# Patient Record
Sex: FEMALE | Race: WHITE | NOT HISPANIC OR LATINO | Employment: FULL TIME | URBAN - METROPOLITAN AREA
[De-identification: names, ages, dates, MRNs, and addresses within clinical notes are randomized per-mention and may not be internally consistent; named-entity substitution may affect disease eponyms.]

---

## 2017-03-14 ENCOUNTER — OFFICE VISIT (OUTPATIENT)
Dept: INTERNAL MEDICINE | Facility: CLINIC | Age: 37
End: 2017-03-14

## 2017-03-14 VITALS
OXYGEN SATURATION: 99 % | HEIGHT: 65 IN | RESPIRATION RATE: 17 BRPM | HEART RATE: 61 BPM | SYSTOLIC BLOOD PRESSURE: 118 MMHG | DIASTOLIC BLOOD PRESSURE: 78 MMHG | BODY MASS INDEX: 20.76 KG/M2 | WEIGHT: 124.6 LBS

## 2017-03-14 DIAGNOSIS — F41.9 ANXIETY: ICD-10-CM

## 2017-03-14 DIAGNOSIS — F51.01 PRIMARY INSOMNIA: Primary | ICD-10-CM

## 2017-03-14 DIAGNOSIS — M50.320 DEGENERATION OF INTERVERTEBRAL DISC OF MID-CERVICAL REGION, UNSPECIFIED SPINAL LEVEL: ICD-10-CM

## 2017-03-14 DIAGNOSIS — Z87.898 HISTORY OF SEIZURE: ICD-10-CM

## 2017-03-14 DIAGNOSIS — R00.2 PALPITATIONS: ICD-10-CM

## 2017-03-14 PROCEDURE — 99214 OFFICE O/P EST MOD 30 MIN: CPT | Performed by: INTERNAL MEDICINE

## 2017-03-14 NOTE — PROGRESS NOTES
"Subjective   Sharron Guardado is a 37 y.o. female here for   Chief Complaint   Patient presents with   • Insomnia     4 month follow-up for use of Zolpidem   • Seizures   .    Vitals:    03/14/17 1542   BP: 118/78   BP Location: Left arm   Patient Position: Sitting   Cuff Size: Adult   Pulse: 61   Resp: 17   SpO2: 99%   Weight: 124 lb 9.6 oz (56.5 kg)   Height: 65.25\" (165.7 cm)       Insomnia   This is a chronic problem. The current episode started more than 1 year ago. The problem occurs constantly. The problem has been unchanged. Pertinent negatives include no chest pain, chills, coughing, fatigue or fever.   Palpitations    This is a chronic problem. The current episode started more than 1 year ago. The problem occurs intermittently. The problem has been unchanged. Associated symptoms include anxiety (\"not bad enough for meds\"). Pertinent negatives include no chest pain, coughing, fever or shortness of breath.        Encounter Diagnoses   Name Primary?   • Primary insomnia Yes   • Degeneration of intervertebral disc of mid-cervical region, unspecified spinal level    • History of seizure    • Palpitations    • Anxiety        The following portions of the patient's history were reviewed and updated as appropriate: allergies, current medications, past social history and problem list.    Review of Systems   Constitutional: Negative for chills, fatigue and fever.   Respiratory: Negative for cough, shortness of breath and wheezing.    Cardiovascular: Positive for palpitations (off & on for yrs). Negative for chest pain and leg swelling.   Psychiatric/Behavioral: Positive for sleep disturbance (better with ambien). Negative for dysphoric mood. The patient is nervous/anxious (\"not bad enough for meds\") and has insomnia.      She had seizure 6 mos ago, but was told it may have been a \"psychological event\" & not a true seizure.  She has had no further events since taking keppra per neurologist.  She needs form filled out " for driving license.  She states no sleepiness or syncope or other neurologic sx.  She states she never had a problem driving.      Objective   Physical Exam   Constitutional: She is oriented to person, place, and time. She appears well-developed and well-nourished. No distress.   Cardiovascular: Normal rate, regular rhythm and normal heart sounds.    Pulmonary/Chest: No respiratory distress. She has no wheezes. She has no rales. She exhibits no tenderness.   Musculoskeletal: She exhibits no edema.   Neurological: She is alert and oriented to person, place, and time. No cranial nerve deficit. She exhibits normal muscle tone. Coordination normal.   Psychiatric: She has a normal mood and affect. Her behavior is normal. Judgment and thought content normal.   Nursing note and vitals reviewed.      Assessment/Plan   Problem List Items Addressed This Visit        Unprioritized    Degeneration of intervertebral disc of mid-cervical region    Insomnia - Primary    History of seizure    Palpitations    Anxiety         S/p event (possible seizure vs anxiety attack 9/15/2016) - ok to drive b/c no recurrent episodes while taking keppra per neurologist.   Palpitations off & on for yrs - call if worsening.   Anxiety (strong family history) & insomnia - need nightly gabapentin (1/4, then 1/2, then 3/4, then 1 qhs if needed).  Ok to use 1/4-1/2 ambien qhs if needed, but use sparingly to prevent habituation.    Frequent neck pain - nightly gabapentin should help chronic pain.     Form filled out - she may get drivers license again b/c no episodes for 6 mos.

## 2017-04-10 DIAGNOSIS — F51.01 PRIMARY INSOMNIA: ICD-10-CM

## 2017-04-10 DIAGNOSIS — M54.2 NECK PAIN: ICD-10-CM

## 2017-04-10 RX ORDER — GABAPENTIN 600 MG/1
TABLET ORAL
Qty: 90 TABLET | Refills: 1 | Status: SHIPPED | OUTPATIENT
Start: 2017-04-10 | End: 2017-10-05 | Stop reason: SDUPTHER

## 2017-04-14 ENCOUNTER — OFFICE VISIT (OUTPATIENT)
Dept: INTERNAL MEDICINE | Facility: CLINIC | Age: 37
End: 2017-04-14

## 2017-04-14 VITALS
BODY MASS INDEX: 20.69 KG/M2 | HEIGHT: 65 IN | SYSTOLIC BLOOD PRESSURE: 116 MMHG | WEIGHT: 124.2 LBS | TEMPERATURE: 98.5 F | DIASTOLIC BLOOD PRESSURE: 76 MMHG

## 2017-04-14 DIAGNOSIS — H92.01 EAR PAIN, RIGHT: Primary | ICD-10-CM

## 2017-04-14 DIAGNOSIS — J02.9 SORE THROAT: ICD-10-CM

## 2017-04-14 PROCEDURE — 99213 OFFICE O/P EST LOW 20 MIN: CPT | Performed by: INTERNAL MEDICINE

## 2017-04-14 NOTE — PROGRESS NOTES
"Subjective   Sharron Guardado is a 37 y.o. female here for   Chief Complaint   Patient presents with   • Sore Throat     x 1 day   • Ear Fullness     Right Ear   .    Vitals:    04/14/17 1331   BP: 116/76   BP Location: Left arm   Patient Position: Sitting   Cuff Size: Adult   Temp: 98.5 °F (36.9 °C)   TempSrc: Temporal Artery    Weight: 124 lb 3.2 oz (56.3 kg)   Height: 65.25\" (165.7 cm)       Sore Throat    This is a new problem. The current episode started yesterday. There has been no fever. The pain is mild. Associated symptoms include ear pain and a plugged ear sensation. Pertinent negatives include no coughing, diarrhea, headaches, shortness of breath, swollen glands, trouble swallowing or vomiting.   Ear Fullness    There is pain in the right ear. This is a new problem. The current episode started yesterday. The problem occurs constantly. The problem has been unchanged. There has been no fever. The pain is moderate. Associated symptoms include a sore throat. Pertinent negatives include no coughing, diarrhea, headaches or vomiting.        Encounter Diagnoses   Name Primary?   • Ear pain, right Yes   • Sore throat        The following portions of the patient's history were reviewed and updated as appropriate: allergies, current medications, past social history and problem list.    Review of Systems   HENT: Positive for ear pain and sore throat. Negative for trouble swallowing.    Respiratory: Negative for cough and shortness of breath.    Gastrointestinal: Negative for diarrhea and vomiting.   Neurological: Negative for headaches.       Objective   Physical Exam   Constitutional: She appears well-developed and well-nourished. No distress.   HENT:   Head: Normocephalic.   Right Ear: External ear normal.   Left Ear: External ear normal. Tympanic membrane is not erythematous.   Nose: Right sinus exhibits no frontal sinus tenderness. Left sinus exhibits no frontal sinus tenderness.   Mouth/Throat: Oropharynx is " clear and moist. No oropharyngeal exudate.   Neck: Normal range of motion. Neck supple.   Cardiovascular: Normal rate, regular rhythm and normal heart sounds.    Pulmonary/Chest: Effort normal and breath sounds normal. No respiratory distress. She has no wheezes. She has no rales. She exhibits no tenderness.   Musculoskeletal: She exhibits no edema.   Lymphadenopathy:     She has no cervical adenopathy.   Psychiatric: She has a normal mood and affect. Her behavior is normal.   Nursing note and vitals reviewed.    Right ear canal blocked with cerumen.  +clear mucous in posterior pharynx.    Assessment/Plan   Problem List Items Addressed This Visit     None      Visit Diagnoses     Ear pain, right    -  Primary    Relevant Orders    Ear Cerumen Removal Lavage    Sore throat             Right ear pain resolved with removal of large amount of cerumen.  She tolerated procedure well with normal TM afterwards.   Mild sore throat is likely from postnasal drip.  Need daily allegra or claritin.  Call if sx persist.

## 2017-05-10 DIAGNOSIS — F51.01 PRIMARY INSOMNIA: ICD-10-CM

## 2017-05-10 RX ORDER — ZOLPIDEM TARTRATE 10 MG/1
10 TABLET ORAL NIGHTLY PRN
Qty: 30 TABLET | Refills: 3 | OUTPATIENT
Start: 2017-05-10 | End: 2017-08-15 | Stop reason: SDUPTHER

## 2017-08-15 ENCOUNTER — OFFICE VISIT (OUTPATIENT)
Dept: INTERNAL MEDICINE | Facility: CLINIC | Age: 37
End: 2017-08-15

## 2017-08-15 VITALS
HEIGHT: 65 IN | BODY MASS INDEX: 21.26 KG/M2 | SYSTOLIC BLOOD PRESSURE: 108 MMHG | DIASTOLIC BLOOD PRESSURE: 76 MMHG | WEIGHT: 127.6 LBS

## 2017-08-15 DIAGNOSIS — F51.01 PRIMARY INSOMNIA: Primary | ICD-10-CM

## 2017-08-15 DIAGNOSIS — J02.9 SORE THROAT: ICD-10-CM

## 2017-08-15 PROCEDURE — 99213 OFFICE O/P EST LOW 20 MIN: CPT | Performed by: INTERNAL MEDICINE

## 2017-08-15 RX ORDER — ZOLPIDEM TARTRATE 10 MG/1
10 TABLET ORAL NIGHTLY PRN
Qty: 30 TABLET | Refills: 3 | OUTPATIENT
Start: 2017-08-15 | End: 2017-10-25 | Stop reason: SDUPTHER

## 2017-08-15 NOTE — PROGRESS NOTES
"Subjective   Sharron Guardado is a 37 y.o. female here for   Chief Complaint   Patient presents with   • Insomnia     3 month follow-up   .    Vitals:    08/15/17 1539   BP: 108/76   BP Location: Left arm   Patient Position: Sitting   Cuff Size: Adult   Weight: 127 lb 9.6 oz (57.9 kg)   Height: 65.25\" (165.7 cm)       Insomnia   This is a chronic problem. The current episode started more than 1 year ago. The problem occurs constantly. The problem has been unchanged. Associated symptoms include a sore throat. Pertinent negatives include no chest pain, chills, congestion, coughing, fatigue or fever.        Encounter Diagnoses   Name Primary?   • Primary insomnia Yes   • Sore throat        The following portions of the patient's history were reviewed and updated as appropriate: allergies, current medications, past social history and problem list.    Review of Systems   Constitutional: Negative for chills, fatigue and fever.   HENT: Positive for sore throat. Negative for congestion, ear discharge, ear pain, postnasal drip, sinus pressure, trouble swallowing and voice change.    Respiratory: Negative for cough, shortness of breath and wheezing.    Cardiovascular: Negative for chest pain, palpitations and leg swelling.   Psychiatric/Behavioral: Positive for sleep disturbance. Negative for dysphoric mood. The patient has insomnia. The patient is not nervous/anxious.        Objective   Physical Exam   Constitutional: She appears well-developed and well-nourished. No distress.   Cardiovascular: Normal rate, regular rhythm and normal heart sounds.    Pulmonary/Chest: No respiratory distress. She has no wheezes. She has no rales. She exhibits no tenderness.   Musculoskeletal: She exhibits no edema.   Psychiatric: She has a normal mood and affect. Her behavior is normal.   Nursing note and vitals reviewed.      Assessment/Plan   Problem List Items Addressed This Visit        Unprioritized    Insomnia - Primary    Relevant " Medications    zolpidem (AMBIEN) 10 MG tablet      Other Visit Diagnoses     Sore throat           Insomnia - ambien called in.  Ok to use 5/7 nights (not nightly).  Call if any problems.   Mild sore throat from allergies (normal exam today) - call if any increase in sx in spite of allegra daily.

## 2017-10-03 ENCOUNTER — TELEPHONE (OUTPATIENT)
Dept: INTERNAL MEDICINE | Facility: CLINIC | Age: 37
End: 2017-10-03

## 2017-10-03 NOTE — TELEPHONE ENCOUNTER
----- Message from Halley Matias MA sent at 10/3/2017 11:09 AM EDT -----  Pt calling and would like an update on form from [facility]. 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

## 2017-10-03 NOTE — TELEPHONE ENCOUNTER
Message on on v/m that paperwork was completed by Dr. Mercer and faxed back over to [place]  [employer] Medical Review Office.

## 2017-10-05 DIAGNOSIS — M54.2 NECK PAIN: ICD-10-CM

## 2017-10-05 DIAGNOSIS — F51.01 PRIMARY INSOMNIA: ICD-10-CM

## 2017-10-06 RX ORDER — GABAPENTIN 600 MG/1
TABLET ORAL
Qty: 90 TABLET | Refills: 1 | OUTPATIENT
Start: 2017-10-06 | End: 2018-05-11 | Stop reason: SDUPTHER

## 2017-10-25 DIAGNOSIS — F51.01 PRIMARY INSOMNIA: ICD-10-CM

## 2017-10-26 RX ORDER — ZOLPIDEM TARTRATE 10 MG/1
TABLET ORAL
Qty: 30 TABLET | Refills: 1 | OUTPATIENT
Start: 2017-10-26 | End: 2017-12-26 | Stop reason: SDUPTHER

## 2017-12-05 ENCOUNTER — OFFICE VISIT (OUTPATIENT)
Dept: INTERNAL MEDICINE | Facility: CLINIC | Age: 37
End: 2017-12-05

## 2017-12-05 VITALS
HEIGHT: 65 IN | WEIGHT: 129.6 LBS | DIASTOLIC BLOOD PRESSURE: 74 MMHG | SYSTOLIC BLOOD PRESSURE: 114 MMHG | BODY MASS INDEX: 21.59 KG/M2

## 2017-12-05 DIAGNOSIS — M54.2 NECK PAIN: ICD-10-CM

## 2017-12-05 DIAGNOSIS — E04.9 THYROID ENLARGED: ICD-10-CM

## 2017-12-05 DIAGNOSIS — F51.01 PRIMARY INSOMNIA: Primary | ICD-10-CM

## 2017-12-05 DIAGNOSIS — G40.909 SEIZURE DISORDER (HCC): ICD-10-CM

## 2017-12-05 PROCEDURE — 99213 OFFICE O/P EST LOW 20 MIN: CPT | Performed by: INTERNAL MEDICINE

## 2017-12-05 NOTE — PROGRESS NOTES
"Subjective   Sharron Guardado is a 37 y.o. female here for   Chief Complaint   Patient presents with   • Insomnia     4 month follow-up   • Neck Pain   .    Vitals:    12/05/17 1609   BP: 114/74   BP Location: Left arm   Patient Position: Sitting   Cuff Size: Adult   Weight: 58.8 kg (129 lb 9.6 oz)   Height: 165.7 cm (65.25\")       Body mass index is 21.4 kg/(m^2).    Insomnia   This is a chronic problem. The current episode started more than 1 year ago. The problem occurs constantly. The problem has been unchanged. Associated symptoms include neck pain. Pertinent negatives include no chest pain, chills, coughing, fatigue or fever.   Neck Pain    This is a recurrent problem. The current episode started more than 1 year ago. The problem occurs intermittently. The problem has been waxing and waning. The pain is associated with nothing. The pain is present in the left side. Pertinent negatives include no chest pain or fever.        The following portions of the patient's history were reviewed and updated as appropriate: allergies, current medications, past social history and problem list.    Review of Systems   Constitutional: Negative for chills, fatigue and fever.   Respiratory: Negative for cough, shortness of breath and wheezing.    Cardiovascular: Positive for palpitations (not worse). Negative for chest pain and leg swelling.   Musculoskeletal: Positive for neck pain.   Psychiatric/Behavioral: Positive for sleep disturbance. Negative for dysphoric mood. The patient has insomnia. The patient is not nervous/anxious.        Objective   Physical Exam   Constitutional: She appears well-developed and well-nourished. No distress.   Cardiovascular: Normal rate, regular rhythm and normal heart sounds.    Pulmonary/Chest: No respiratory distress. She has no wheezes. She has no rales. She exhibits no tenderness.   Musculoskeletal: She exhibits no edema.   Psychiatric: She has a normal mood and affect. Her behavior is normal. "   Nursing note and vitals reviewed.      Assessment/Plan   Diagnoses and all orders for this visit:    Primary insomnia  Comments:  ok with ambien - use sparingly (warned again of habituation) - call if any side effects    Neck pain  Comments:  off/on - ok with occ gabapentin    Thyroid enlarged  Comments:  borderline - need u/s  Orders:  -     US Thyroid    Seizure disorder  Comments:  none with keppra - call if problems

## 2017-12-21 ENCOUNTER — HOSPITAL ENCOUNTER (OUTPATIENT)
Dept: ULTRASOUND IMAGING | Facility: HOSPITAL | Age: 37
Discharge: HOME | End: 2017-12-21
Attending: INTERNAL MEDICINE | Admitting: INTERNAL MEDICINE

## 2017-12-21 PROCEDURE — 76536 US EXAM OF HEAD AND NECK: CPT

## 2017-12-26 DIAGNOSIS — F51.01 PRIMARY INSOMNIA: ICD-10-CM

## 2017-12-27 RX ORDER — ZOLPIDEM TARTRATE 10 MG/1
TABLET ORAL
Qty: 30 TABLET | Refills: 2 | OUTPATIENT
Start: 2017-12-27 | End: 2018-03-26 | Stop reason: SDUPTHER

## 2017-12-28 ENCOUNTER — TELEPHONE (OUTPATIENT)
Dept: INTERNAL MEDICINE | Facility: CLINIC | Age: 37
End: 2017-12-28

## 2017-12-28 NOTE — TELEPHONE ENCOUNTER
----- Message from Asha Palmer sent at 12/28/2017  1:56 PM EST -----  Contact: pt - Dr Mercer's pt - RE: US results  Pt calling and would like results of US done on 12/05/2017. Results are in pt's chart. Could you please call pt to discuss? Please advise. Thanks      Pt # [phone] or [phone]

## 2018-03-26 DIAGNOSIS — F51.01 PRIMARY INSOMNIA: ICD-10-CM

## 2018-03-27 RX ORDER — ZOLPIDEM TARTRATE 10 MG/1
TABLET ORAL
Qty: 30 TABLET | Refills: 0 | OUTPATIENT
Start: 2018-03-27 | End: 2018-05-01 | Stop reason: SDUPTHER

## 2018-03-27 NOTE — TELEPHONE ENCOUNTER
Please review refill request:    Last OV:12/5/17 next OV 4/5/18    Last Prescribed: 12/5/17 # 30 w/ 2 refills    KOTA: Ordered     Thank you,    Danny

## 2018-04-05 ENCOUNTER — OFFICE VISIT (OUTPATIENT)
Dept: INTERNAL MEDICINE | Facility: CLINIC | Age: 38
End: 2018-04-05

## 2018-04-05 VITALS
HEART RATE: 69 BPM | OXYGEN SATURATION: 94 % | HEIGHT: 65 IN | WEIGHT: 129 LBS | SYSTOLIC BLOOD PRESSURE: 122 MMHG | DIASTOLIC BLOOD PRESSURE: 88 MMHG | BODY MASS INDEX: 21.49 KG/M2

## 2018-04-05 DIAGNOSIS — Z00.00 ANNUAL PHYSICAL EXAM: Primary | ICD-10-CM

## 2018-04-05 DIAGNOSIS — F51.01 PRIMARY INSOMNIA: ICD-10-CM

## 2018-04-05 DIAGNOSIS — R53.82 CHRONIC FATIGUE: ICD-10-CM

## 2018-04-05 DIAGNOSIS — M54.2 NECK PAIN: ICD-10-CM

## 2018-04-05 DIAGNOSIS — G40.109 TEMPORAL LOBE EPILEPSY (HCC): ICD-10-CM

## 2018-04-05 PROBLEM — R53.83 FATIGUE: Status: ACTIVE | Noted: 2018-04-05

## 2018-04-05 LAB
25(OH)D3 SERPL-MCNC: 56.8 NG/ML (ref 30–100)
ALBUMIN SERPL-MCNC: 4.5 G/DL (ref 3.5–5.2)
ALBUMIN/GLOB SERPL: 1.7 G/DL
ALP SERPL-CCNC: 57 U/L (ref 39–117)
ALT SERPL W P-5'-P-CCNC: 14 U/L (ref 1–33)
ANION GAP SERPL CALCULATED.3IONS-SCNC: 13.5 MMOL/L
AST SERPL-CCNC: 17 U/L (ref 1–32)
BACTERIA UR QL AUTO: ABNORMAL /HPF
BASOPHILS # BLD AUTO: 0 10*3/MM3 (ref 0–0.2)
BASOPHILS NFR BLD AUTO: 0 % (ref 0–2)
BILIRUB SERPL-MCNC: 1 MG/DL (ref 0.1–1.2)
BILIRUB UR QL STRIP: NEGATIVE
BUN BLD-MCNC: 13 MG/DL (ref 6–20)
BUN/CREAT SERPL: 16 (ref 7–25)
CALCIUM SPEC-SCNC: 10.2 MG/DL (ref 8.6–10.5)
CHLORIDE SERPL-SCNC: 100 MMOL/L (ref 98–107)
CHOLEST SERPL-MCNC: 176 MG/DL (ref 0–200)
CLARITY UR: CLEAR
CO2 SERPL-SCNC: 26.5 MMOL/L (ref 22–29)
COLOR UR: YELLOW
CREAT BLD-MCNC: 0.81 MG/DL (ref 0.57–1)
DEPRECATED RDW RBC AUTO: 39.9 FL (ref 37–54)
EOSINOPHIL # BLD AUTO: 0.31 10*3/MM3 (ref 0–0.7)
EOSINOPHIL NFR BLD AUTO: 4.7 % (ref 0–5)
ERYTHROCYTE [DISTWIDTH] IN BLOOD BY AUTOMATED COUNT: 11.6 % (ref 11.5–15)
GFR SERPL CREATININE-BSD FRML MDRD: 79 ML/MIN/1.73
GLOBULIN UR ELPH-MCNC: 2.7 GM/DL
GLUCOSE BLD-MCNC: 87 MG/DL (ref 65–99)
GLUCOSE UR STRIP-MCNC: NEGATIVE MG/DL
HCT VFR BLD AUTO: 38.8 % (ref 34.1–44.9)
HDLC SERPL-MCNC: 91 MG/DL (ref 40–60)
HGB BLD-MCNC: 13 G/DL (ref 11.2–15.7)
HGB UR QL STRIP.AUTO: NEGATIVE
HYALINE CASTS UR QL AUTO: ABNORMAL /LPF
KETONES UR QL STRIP: NEGATIVE
LDLC SERPL CALC-MCNC: 62 MG/DL (ref 0–100)
LDLC/HDLC SERPL: 0.69 {RATIO}
LEUKOCYTE ESTERASE UR QL STRIP.AUTO: ABNORMAL
LYMPHOCYTES # BLD AUTO: 2.25 10*3/MM3 (ref 0.8–7)
LYMPHOCYTES NFR BLD AUTO: 34 % (ref 10–60)
MCH RBC QN AUTO: 32.4 PG (ref 26–34)
MCHC RBC AUTO-ENTMCNC: 33.5 G/DL (ref 31–37)
MCV RBC AUTO: 96.8 FL (ref 80–100)
MONOCYTES # BLD AUTO: 0.46 10*3/MM3 (ref 0–1)
MONOCYTES NFR BLD AUTO: 7 % (ref 0–13)
NEUTROPHILS # BLD AUTO: 3.59 10*3/MM3 (ref 1–11)
NEUTROPHILS NFR BLD AUTO: 54.3 % (ref 30–85)
NITRITE UR QL STRIP: NEGATIVE
PH UR STRIP.AUTO: 6.5 [PH] (ref 5–8)
PLATELET # BLD AUTO: 191 10*3/MM3 (ref 150–450)
PMV BLD AUTO: 11.2 FL (ref 6–12)
POTASSIUM BLD-SCNC: 3.6 MMOL/L (ref 3.5–5.2)
PROT SERPL-MCNC: 7.2 G/DL (ref 6–8.5)
PROT UR QL STRIP: NEGATIVE
RBC # BLD AUTO: 4.01 10*6/MM3 (ref 3.93–5.22)
RBC # UR: ABNORMAL /HPF
REF LAB TEST METHOD: ABNORMAL
SODIUM BLD-SCNC: 140 MMOL/L (ref 136–145)
SP GR UR STRIP: <=1.005 (ref 1–1.03)
SQUAMOUS #/AREA URNS HPF: ABNORMAL /HPF
TRIGL SERPL-MCNC: 113 MG/DL (ref 0–150)
TSH SERPL DL<=0.05 MIU/L-ACNC: 3.48 MIU/ML (ref 0.27–4.2)
UROBILINOGEN UR QL STRIP: ABNORMAL
VLDLC SERPL-MCNC: 22.6 MG/DL (ref 5–40)
WBC NRBC COR # BLD: 6.61 10*3/MM3 (ref 5–10)
WBC UR QL AUTO: ABNORMAL /HPF

## 2018-04-05 PROCEDURE — 82306 VITAMIN D 25 HYDROXY: CPT | Performed by: INTERNAL MEDICINE

## 2018-04-05 PROCEDURE — 99395 PREV VISIT EST AGE 18-39: CPT | Performed by: INTERNAL MEDICINE

## 2018-04-05 PROCEDURE — 80053 COMPREHEN METABOLIC PANEL: CPT | Performed by: INTERNAL MEDICINE

## 2018-04-05 PROCEDURE — 85025 COMPLETE CBC W/AUTO DIFF WBC: CPT | Performed by: INTERNAL MEDICINE

## 2018-04-05 PROCEDURE — 81001 URINALYSIS AUTO W/SCOPE: CPT | Performed by: INTERNAL MEDICINE

## 2018-04-05 PROCEDURE — 36415 COLL VENOUS BLD VENIPUNCTURE: CPT | Performed by: INTERNAL MEDICINE

## 2018-04-05 PROCEDURE — 84443 ASSAY THYROID STIM HORMONE: CPT | Performed by: INTERNAL MEDICINE

## 2018-04-05 PROCEDURE — 80061 LIPID PANEL: CPT | Performed by: INTERNAL MEDICINE

## 2018-04-05 NOTE — PROGRESS NOTES
"Subjective   Sharron Guardado is a 38 y.o. female here for   Chief Complaint   Patient presents with   • Insomnia     4 mo follow up   • Neck Pain     Pain is better    • Annual Exam   .    Vitals:    04/05/18 0801 04/05/18 0850   BP: 128/90 122/88   BP Location: Right arm    Pulse: 69    SpO2: 94%    Weight: 58.5 kg (129 lb)    Height: 165.7 cm (65.24\")        Body mass index is 21.31 kg/m².    Insomnia   This is a chronic problem. The problem occurs constantly. The problem has been unchanged. Associated symptoms include fatigue and neck pain. Pertinent negatives include no abdominal pain, arthralgias, chest pain, chills, congestion, coughing, fever, headaches, joint swelling, myalgias, nausea, numbness, rash, sore throat, vomiting or weakness.   Neck Pain    This is a chronic problem. The current episode started more than 1 year ago. The problem occurs constantly. The problem has been unchanged. Pertinent negatives include no chest pain, fever, headaches, numbness, trouble swallowing or weakness.        The following portions of the patient's history were reviewed and updated as appropriate: allergies, current medications, past social history and problem list.    Review of Systems   Constitutional: Positive for fatigue. Negative for appetite change, chills, fever and unexpected weight change.   HENT: Negative for congestion, ear pain, mouth sores, sore throat, tinnitus, trouble swallowing and voice change.    Eyes: Negative for pain and visual disturbance.   Respiratory: Negative for cough, choking, shortness of breath and wheezing.    Cardiovascular: Negative for chest pain, palpitations and leg swelling.   Gastrointestinal: Negative for abdominal pain, blood in stool, constipation, diarrhea, nausea and vomiting.   Endocrine: Negative for cold intolerance, heat intolerance, polydipsia, polyphagia and polyuria.   Genitourinary: Negative for difficulty urinating, dysuria, flank pain, frequency, hematuria and " urgency.   Musculoskeletal: Positive for neck pain and neck stiffness. Negative for arthralgias, back pain, gait problem, joint swelling and myalgias.   Skin: Negative for color change and rash.   Allergic/Immunologic: Positive for environmental allergies. Negative for food allergies and immunocompromised state.   Neurological: Negative for dizziness, tremors, seizures, syncope, speech difficulty, weakness, numbness and headaches.   Hematological: Negative for adenopathy. Does not bruise/bleed easily.   Psychiatric/Behavioral: Positive for sleep disturbance. Negative for agitation, confusion, decreased concentration, dysphoric mood and suicidal ideas. The patient has insomnia. The patient is not nervous/anxious.        Objective   Physical Exam   Constitutional: She appears well-developed and well-nourished. No distress.   HENT:   Right Ear: Hearing, tympanic membrane, external ear and ear canal normal.   Left Ear: Hearing, tympanic membrane, external ear and ear canal normal.   Nose: Right sinus exhibits no maxillary sinus tenderness and no frontal sinus tenderness. Left sinus exhibits no maxillary sinus tenderness and no frontal sinus tenderness.   Eyes: Conjunctivae, EOM and lids are normal. Pupils are equal, round, and reactive to light.   Neck: Trachea normal. Neck supple. No JVD present. Carotid bruit is not present. No tracheal deviation present. No thyroid mass and no thyromegaly present.   Cardiovascular: Normal rate, regular rhythm, S1 normal, S2 normal and normal heart sounds.  Exam reveals no gallop and no friction rub.    No murmur heard.  Pulses:       Carotid pulses are 2+ on the right side, and 2+ on the left side.       Radial pulses are 2+ on the right side, and 2+ on the left side.        Dorsalis pedis pulses are 2+ on the right side, and 2+ on the left side.        Posterior tibial pulses are 2+ on the right side, and 2+ on the left side.   Pulmonary/Chest: Effort normal and breath sounds  normal. No respiratory distress. She has no wheezes. She has no rales. Chest wall is not dull to percussion. She exhibits no tenderness.   Abdominal: Soft. Normal aorta and bowel sounds are normal. She exhibits no abdominal bruit. There is no hepatosplenomegaly. There is no tenderness. There is no rebound and no guarding. No hernia.   Musculoskeletal: Normal range of motion. She exhibits no edema.   Lymphadenopathy:     She has no cervical adenopathy.     She has no axillary adenopathy.        Right: No supraclavicular adenopathy present.        Left: No supraclavicular adenopathy present.   Neurological: She is alert. She has normal strength. No cranial nerve deficit or sensory deficit. She displays a negative Romberg sign.   Reflex Scores:       Patellar reflexes are 2+ on the right side and 2+ on the left side.  Skin: Skin is warm and dry.   Psychiatric: She has a normal mood and affect. Her behavior is normal.   Nursing note and vitals reviewed.      Assessment/Plan   Diagnoses and all orders for this visit:    Annual physical exam  -     CBC Auto Differential; Future  -     Comprehensive Metabolic Panel; Future  -     Lipid Panel; Future  -     TSH Rfx On Abnormal To Free T4; Future  -     Urinalysis With / Microscopic If Indicated - Urine, Clean Catch; Future  -     CBC Auto Differential  -     Comprehensive Metabolic Panel  -     Lipid Panel  -     TSH Rfx On Abnormal To Free T4  -     Urinalysis With / Microscopic If Indicated - Urine, Clean Catch  -     Urinalysis, Microscopic Only - Urine, Clean Catch; Future  -     Urinalysis, Microscopic Only - Urine, Clean Catch    Temporal lobe epilepsy  Comments:  ok with daily keppra - need level  Orders:  -     Levetiracetam Level (Keppra); Future  -     Levetiracetam Level (Keppra)    Neck pain  Comments:  slt better with nightly gabapentin (no better with PT, epidural injections,etc)    Primary insomnia  Comments:  no change for many yrs - still needing 1/2 ambien  nightly - call if any side effects    Chronic fatigue  Comments:  call if worse  Orders:  -     TSH Rfx On Abnormal To Free T4; Future  -     Vitamin D 25 Hydroxy; Future  -     TSH Rfx On Abnormal To Free T4  -     Vitamin D 25 Hydroxy

## 2018-04-08 LAB — LEVETIRACETAM SERPL-MCNC: 10.6 UG/ML (ref 10–40)

## 2018-05-01 DIAGNOSIS — F51.01 PRIMARY INSOMNIA: ICD-10-CM

## 2018-05-02 RX ORDER — ZOLPIDEM TARTRATE 10 MG/1
TABLET ORAL
Qty: 30 TABLET | Refills: 0 | Status: SHIPPED | OUTPATIENT
Start: 2018-05-02 | End: 2018-06-04 | Stop reason: SDUPTHER

## 2018-05-11 DIAGNOSIS — F51.01 PRIMARY INSOMNIA: ICD-10-CM

## 2018-05-11 DIAGNOSIS — M54.2 NECK PAIN: ICD-10-CM

## 2018-05-11 RX ORDER — GABAPENTIN 600 MG/1
600 TABLET ORAL NIGHTLY
Qty: 90 TABLET | Refills: 1 | OUTPATIENT
Start: 2018-05-11 | End: 2019-01-05 | Stop reason: SDUPTHER

## 2018-05-11 NOTE — TELEPHONE ENCOUNTER
----- Message from Asha Palmer sent at 5/11/2018  8:53 AM EDT -----  Contact: pt - Dr Mercer's pt - RE: Rx refill  Pt calling and would like a refill on Rx      gabapentin (NEURONTIN) 600 MG tablet 90 tablet    Sig: TAKE 1 TABLET BY MOUTH EVERY NIGHT     [facility] [facility] [address] [address] AT [facility] - [phone] [facility] [phone] FX    Pt # [phone]

## 2018-06-04 DIAGNOSIS — F51.01 PRIMARY INSOMNIA: ICD-10-CM

## 2018-06-07 RX ORDER — ZOLPIDEM TARTRATE 10 MG/1
TABLET ORAL
Qty: 30 TABLET | Refills: 0 | Status: SHIPPED | OUTPATIENT
Start: 2018-06-07 | End: 2018-07-11 | Stop reason: SDUPTHER

## 2018-07-02 ENCOUNTER — OFFICE VISIT (OUTPATIENT)
Dept: INTERNAL MEDICINE | Facility: CLINIC | Age: 38
End: 2018-07-02

## 2018-07-02 VITALS
WEIGHT: 126 LBS | DIASTOLIC BLOOD PRESSURE: 86 MMHG | SYSTOLIC BLOOD PRESSURE: 128 MMHG | BODY MASS INDEX: 20.82 KG/M2 | TEMPERATURE: 98.3 F | OXYGEN SATURATION: 98 % | HEART RATE: 68 BPM

## 2018-07-02 DIAGNOSIS — J06.9 ACUTE URI: Primary | ICD-10-CM

## 2018-07-02 PROCEDURE — 99213 OFFICE O/P EST LOW 20 MIN: CPT | Performed by: NURSE PRACTITIONER

## 2018-07-02 RX ORDER — GUAIFENESIN 600 MG/1
1200 TABLET, EXTENDED RELEASE ORAL 2 TIMES DAILY
Qty: 28 TABLET | Refills: 0
Start: 2018-07-02 | End: 2018-07-09

## 2018-07-02 NOTE — PROGRESS NOTES
Subjective   Sharron Guardado is a 38 y.o. female.     No recent air travel. Several individuals at work have been sick. She was given z-fernanda by a provider which she has completed reports her symptoms are worst.       URI    This is a new problem. The current episode started 1 to 4 weeks ago. The problem has been gradually worsening. There has been no fever. Associated symptoms include congestion, coughing, rhinorrhea, sinus pain, sneezing and a sore throat (getting better). Pertinent negatives include no abdominal pain, chest pain, diarrhea, ear pain, headaches, nausea, neck pain, plugged ear sensation, vomiting or wheezing. Treatments tried: z-fernanda, mucinex, tylenol pm, ibuprofen , allegra and flonase  The treatment provided moderate relief.        The following portions of the patient's history were reviewed and updated as appropriate: allergies, current medications, past social history and problem list.    Review of Systems   Constitutional: Positive for chills. Negative for appetite change, fatigue and fever.   HENT: Positive for congestion, postnasal drip, rhinorrhea, sinus pain, sneezing and sore throat (getting better). Negative for ear discharge, ear pain, facial swelling, hearing loss, sinus pressure and tinnitus.    Respiratory: Positive for cough. Negative for chest tightness, shortness of breath and wheezing.    Cardiovascular: Negative for chest pain, palpitations and leg swelling.   Gastrointestinal: Negative for abdominal pain, diarrhea, nausea and vomiting.   Musculoskeletal: Negative for neck pain and neck stiffness.   Allergic/Immunologic: Positive for environmental allergies.   Neurological: Negative for headaches.   Hematological: Negative for adenopathy.       Objective   Physical Exam   Constitutional: She appears well-developed and well-nourished. She is cooperative. She does not have a sickly appearance. She does not appear ill.   HENT:   Head: Normocephalic.   Right Ear: Hearing and external  ear normal. No drainage, swelling or tenderness. No mastoid tenderness. Tympanic membrane is bulging. Tympanic membrane is not injected, not scarred and not erythematous. Tympanic membrane mobility is normal. No middle ear effusion. No decreased hearing is noted.   Left Ear: Hearing and external ear normal. No drainage, swelling or tenderness. No mastoid tenderness. Tympanic membrane is bulging. Tympanic membrane is not injected, not scarred and not erythematous. Tympanic membrane mobility is normal.  No middle ear effusion. No decreased hearing is noted.   Nose: No mucosal edema, rhinorrhea, sinus tenderness or nasal deformity. Right sinus exhibits maxillary sinus tenderness. Right sinus exhibits no frontal sinus tenderness. Left sinus exhibits maxillary sinus tenderness. Left sinus exhibits no frontal sinus tenderness.   Mouth/Throat: Mucous membranes are normal. Normal dentition. Posterior oropharyngeal erythema (with hypersecretions ) present. No tonsillar exudate.   Eyes: Conjunctivae and lids are normal. Pupils are equal, round, and reactive to light. Right eye exhibits no discharge and no exudate. Left eye exhibits no discharge and no exudate.   Neck: Trachea normal and normal range of motion. No edema present. No thyroid mass and no thyromegaly present.   Cardiovascular: Regular rhythm, normal heart sounds and normal pulses.    No murmur heard.  Pulmonary/Chest: Breath sounds normal. No respiratory distress. She has no decreased breath sounds. She has no wheezes. She has no rhonchi. She has no rales.   Dry cough    Lymphadenopathy:        Head (right side): No submental, no submandibular, no tonsillar, no preauricular, no posterior auricular and no occipital adenopathy present.        Head (left side): No submental, no submandibular, no tonsillar, no preauricular, no posterior auricular and no occipital adenopathy present.     She has no cervical adenopathy.   Neurological: She is alert.   Skin: Skin is  warm, dry and intact. No cyanosis. Nails show no clubbing.       Assessment/Plan   Sharron was seen today for uri.    Diagnoses and all orders for this visit:    Acute URI  Comments:  continue with allegra, flonase, add ocean spray, drink plenty of water   Orders:  -     guaiFENesin (MUCINEX) 600 MG 12 hr tablet; Take 2 tablets by mouth 2 (Two) Times a Day for 7 days.    She will return for worsening of symptoms. She will call in next 2-3 days if no relief of symptoms and will consider another antibiotic.

## 2018-07-02 NOTE — PATIENT INSTRUCTIONS
"Upper Respiratory Infection, Adult  Most upper respiratory infections (URIs) are a viral infection of the air passages leading to the lungs. A URI affects the nose, throat, and upper air passages. The most common type of URI is nasopharyngitis and is typically referred to as \"the common cold.\"  URIs run their course and usually go away on their own. Most of the time, a URI does not require medical attention, but sometimes a bacterial infection in the upper airways can follow a viral infection. This is called a secondary infection. Sinus and middle ear infections are common types of secondary upper respiratory infections.  Bacterial pneumonia can also complicate a URI. A URI can worsen asthma and chronic obstructive pulmonary disease (COPD). Sometimes, these complications can require emergency medical care and may be life threatening.  What are the causes?  Almost all URIs are caused by viruses. A virus is a type of germ and can spread from one person to another.  What increases the risk?  You may be at risk for a URI if:  · You smoke.  · You have chronic heart or lung disease.  · You have a weakened defense (immune) system.  · You are very young or very old.  · You have nasal allergies or asthma.  · You work in crowded or poorly ventilated areas.  · You work in health care facilities or schools.    What are the signs or symptoms?  Symptoms typically develop 2-3 days after you come in contact with a cold virus. Most viral URIs last 7-10 days. However, viral URIs from the influenza virus (flu virus) can last 14-18 days and are typically more severe. Symptoms may include:  · Runny or stuffy (congested) nose.  · Sneezing.  · Cough.  · Sore throat.  · Headache.  · Fatigue.  · Fever.  · Loss of appetite.  · Pain in your forehead, behind your eyes, and over your cheekbones (sinus pain).  · Muscle aches.    How is this diagnosed?  Your health care provider may diagnose a URI by:  · Physical exam.  · Tests to check that your " symptoms are not due to another condition such as:  ? Strep throat.  ? Sinusitis.  ? Pneumonia.  ? Asthma.    How is this treated?  A URI goes away on its own with time. It cannot be cured with medicines, but medicines may be prescribed or recommended to relieve symptoms. Medicines may help:  · Reduce your fever.  · Reduce your cough.  · Relieve nasal congestion.    Follow these instructions at home:  · Take medicines only as directed by your health care provider.  · Gargle warm saltwater or take cough drops to comfort your throat as directed by your health care provider.  · Use a warm mist humidifier or inhale steam from a shower to increase air moisture. This may make it easier to breathe.  · Drink enough fluid to keep your urine clear or pale yellow.  · Eat soups and other clear broths and maintain good nutrition.  · Rest as needed.  · Return to work when your temperature has returned to normal or as your health care provider advises. You may need to stay home longer to avoid infecting others. You can also use a face mask and careful hand washing to prevent spread of the virus.  · Increase the usage of your inhaler if you have asthma.  · Do not use any tobacco products, including cigarettes, chewing tobacco, or electronic cigarettes. If you need help quitting, ask your health care provider.  How is this prevented?  The best way to protect yourself from getting a cold is to practice good hygiene.  · Avoid oral or hand contact with people with cold symptoms.  · Wash your hands often if contact occurs.    There is no clear evidence that vitamin C, vitamin E, echinacea, or exercise reduces the chance of developing a cold. However, it is always recommended to get plenty of rest, exercise, and practice good nutrition.  Contact a health care provider if:  · You are getting worse rather than better.  · Your symptoms are not controlled by medicine.  · You have chills.  · You have worsening shortness of breath.  · You have  brown or red mucus.  · You have yellow or brown nasal discharge.  · You have pain in your face, especially when you bend forward.  · You have a fever.  · You have swollen neck glands.  · You have pain while swallowing.  · You have white areas in the back of your throat.  Get help right away if:  · You have severe or persistent:  ? Headache.  ? Ear pain.  ? Sinus pain.  ? Chest pain.  · You have chronic lung disease and any of the following:  ? Wheezing.  ? Prolonged cough.  ? Coughing up blood.  ? A change in your usual mucus.  · You have a stiff neck.  · You have changes in your:  ? Vision.  ? Hearing.  ? Thinking.  ? Mood.  This information is not intended to replace advice given to you by your health care provider. Make sure you discuss any questions you have with your health care provider.  Document Released: 06/13/2002 Document Revised: 08/20/2017 Document Reviewed: 03/25/2015  [employer] Patient Education © 2018 [employer]

## 2018-07-11 DIAGNOSIS — F51.01 PRIMARY INSOMNIA: ICD-10-CM

## 2018-07-11 RX ORDER — ZOLPIDEM TARTRATE 10 MG/1
TABLET ORAL
Qty: 30 TABLET | Refills: 0 | OUTPATIENT
Start: 2018-07-15 | End: 2018-08-13 | Stop reason: SDUPTHER

## 2018-08-13 DIAGNOSIS — F51.01 PRIMARY INSOMNIA: ICD-10-CM

## 2018-08-13 RX ORDER — ZOLPIDEM TARTRATE 10 MG/1
TABLET ORAL
Qty: 30 TABLET | Refills: 2 | OUTPATIENT
Start: 2018-08-13 | End: 2018-11-07 | Stop reason: SDUPTHER

## 2018-09-13 ENCOUNTER — OFFICE VISIT (OUTPATIENT)
Dept: INTERNAL MEDICINE | Facility: CLINIC | Age: 38
End: 2018-09-13

## 2018-09-13 VITALS
WEIGHT: 125 LBS | SYSTOLIC BLOOD PRESSURE: 118 MMHG | HEIGHT: 65 IN | DIASTOLIC BLOOD PRESSURE: 76 MMHG | BODY MASS INDEX: 20.83 KG/M2

## 2018-09-13 DIAGNOSIS — M54.2 NECK PAIN: ICD-10-CM

## 2018-09-13 DIAGNOSIS — F40.243 FLYING PHOBIA: ICD-10-CM

## 2018-09-13 DIAGNOSIS — R00.2 PALPITATIONS: ICD-10-CM

## 2018-09-13 DIAGNOSIS — F51.01 PRIMARY INSOMNIA: Primary | ICD-10-CM

## 2018-09-13 DIAGNOSIS — R09.81 HEAD CONGESTION: ICD-10-CM

## 2018-09-13 PROCEDURE — 99214 OFFICE O/P EST MOD 30 MIN: CPT | Performed by: INTERNAL MEDICINE

## 2018-09-13 RX ORDER — DIAZEPAM 5 MG/1
5 TABLET ORAL NIGHTLY PRN
Qty: 20 TABLET | Refills: 0 | Status: SHIPPED | OUTPATIENT
Start: 2018-09-13 | End: 2019-07-25

## 2018-09-13 NOTE — PROGRESS NOTES
"Subjective   Sharron Guardado is a 38 y.o. female here for   Chief Complaint   Patient presents with   • Insomnia     5 month follow-up   • URI     started today   .    Vitals:    09/13/18 1359   BP: 118/76   BP Location: Left arm   Patient Position: Sitting   Cuff Size: Adult   Weight: 56.7 kg (125 lb)   Height: 165.7 cm (65.25\")       Body mass index is 20.64 kg/m².    Insomnia   This is a chronic problem. The current episode started more than 1 year ago. The problem occurs constantly. The problem has been unchanged. Associated symptoms include congestion. Pertinent negatives include no chest pain, chills, coughing, fatigue, fever or sore throat.   URI    This is a new problem. The current episode started today. The problem has been unchanged. There has been no fever. Associated symptoms include congestion. Pertinent negatives include no chest pain, coughing, ear pain, sore throat or wheezing.        The following portions of the patient's history were reviewed and updated as appropriate: allergies, current medications, past social history and problem list.    Review of Systems   Constitutional: Negative for chills, fatigue and fever.   HENT: Positive for congestion and postnasal drip. Negative for ear pain and sore throat.    Respiratory: Negative for cough, shortness of breath and wheezing.    Cardiovascular: Positive for palpitations (no worse). Negative for chest pain and leg swelling.   Psychiatric/Behavioral: Positive for sleep disturbance. Negative for dysphoric mood. The patient is nervous/anxious and has insomnia.      She wants valium b/c fear of flying - flying to [place].    Objective   Physical Exam   Constitutional: She appears well-developed and well-nourished. No distress.   HENT:   Head: Normocephalic.   Right Ear: External ear normal. Tympanic membrane is bulging. Tympanic membrane is not erythematous.   Left Ear: External ear normal. Tympanic membrane is bulging. Tympanic membrane is not " erythematous.   Nose: Right sinus exhibits no frontal sinus tenderness. Left sinus exhibits no frontal sinus tenderness.   Mouth/Throat: Oropharynx is clear and moist. No oropharyngeal exudate.   Neck: Normal range of motion. Neck supple.   Cardiovascular: Normal rate, regular rhythm and normal heart sounds.    Pulmonary/Chest: Effort normal and breath sounds normal. No respiratory distress. She has no wheezes. She has no rales. She exhibits no tenderness.   Musculoskeletal: She exhibits no edema.   Lymphadenopathy:     She has no cervical adenopathy.   Psychiatric: She has a normal mood and affect. Her behavior is normal.   Nursing note and vitals reviewed.      Assessment/Plan   Diagnoses and all orders for this visit:    Primary insomnia  Comments:  need nightly gabapentin instead of ambien b/c will get resistant - only use ambien prn    Flying phobia  Comments:  ok to try valium at home 1st (1/2 pill) - no driving,etc  Orders:  -     diazePAM (VALIUM) 5 MG tablet; Take 1 tablet by mouth At Night As Needed for Anxiety.    Palpitations  Comments:  need to see cardiol again    Neck pain  Comments:  need nightly gabapentin    Head congestion  Comments:  need flonase, allegra, nasal saline, mucinex,etc - call if sick     Don't use ambien if using valium at night.

## 2018-09-17 ENCOUNTER — TELEPHONE (OUTPATIENT)
Dept: INTERNAL MEDICINE | Facility: CLINIC | Age: 38
End: 2018-09-17

## 2018-09-17 NOTE — TELEPHONE ENCOUNTER
----- Message from Macey Hicks sent at 9/17/2018 12:58 PM EDT -----  Contact: pt  Pt was prescribed   diazePAM (VALIUM) 5 MG tablet  On 9/13  She states that this dose is not doing anything for her and would like to increase.  Took half one day,  Next  Day took a full tablet  Next day she took two tablets.    Pt# [phone]

## 2018-10-15 ENCOUNTER — TELEPHONE (OUTPATIENT)
Dept: INTERNAL MEDICINE | Facility: CLINIC | Age: 38
End: 2018-10-15

## 2018-10-15 NOTE — TELEPHONE ENCOUNTER
Message left on v/m asking Ms. Guardado if she had any recent episode of seizure, blackout,  And or loss of consciousness.    I also need to know if she see an eye doctor.

## 2018-10-15 NOTE — TELEPHONE ENCOUNTER
----- Message from Macey Hicks sent at 10/15/2018 12:45 PM EDT -----  Contact: Pt   Pt is calling to check on her fax message.  For driving, regarding patient seizures.    Please advise.   Pt# [phone]  Okay to leave detailed message.

## 2018-10-15 NOTE — TELEPHONE ENCOUNTER
----- Message from Marline Dumont sent at 10/15/2018  2:29 PM EDT -----  Contact: Patient  Patient calling to let Dr. Mercer know her last seizure was 2016. Nothing recent. Please advise    Patient:[phone]

## 2018-10-15 NOTE — TELEPHONE ENCOUNTER
Fax was taken off a printer this morning and given to provider to review and fill out. Form was faxed late last Friday 10/12/18. Once this has been full completed patient will be informed of this.

## 2018-10-25 NOTE — TELEPHONE ENCOUNTER
Pt is calling to check on status of the form mentioned in previous messages.  States the the State has not yet received forms.  Please call with update.  Pt#[phone]

## 2018-11-07 DIAGNOSIS — F51.01 PRIMARY INSOMNIA: ICD-10-CM

## 2018-11-08 RX ORDER — ZOLPIDEM TARTRATE 10 MG/1
TABLET ORAL
Qty: 30 TABLET | Refills: 0 | OUTPATIENT
Start: 2018-11-08 | End: 2018-12-09 | Stop reason: SDUPTHER

## 2018-11-08 NOTE — TELEPHONE ENCOUNTER
Please review refill request:    Last OV: 9/13/18    Last Prescribed: 8/13/18    KOTA: Ordered    Thank you,    Von

## 2018-12-09 DIAGNOSIS — F51.01 PRIMARY INSOMNIA: ICD-10-CM

## 2018-12-10 RX ORDER — ZOLPIDEM TARTRATE 10 MG/1
TABLET ORAL
Qty: 30 TABLET | Refills: 2 | OUTPATIENT
Start: 2018-12-10 | End: 2019-03-13 | Stop reason: SDUPTHER

## 2019-01-05 DIAGNOSIS — M54.2 NECK PAIN: ICD-10-CM

## 2019-01-05 DIAGNOSIS — F51.01 PRIMARY INSOMNIA: ICD-10-CM

## 2019-01-07 RX ORDER — GABAPENTIN 600 MG/1
TABLET ORAL
Qty: 90 TABLET | Refills: 0 | OUTPATIENT
Start: 2019-01-07 | End: 2019-04-18 | Stop reason: SDUPTHER

## 2019-02-07 ENCOUNTER — OFFICE VISIT (OUTPATIENT)
Dept: INTERNAL MEDICINE | Facility: CLINIC | Age: 39
End: 2019-02-07

## 2019-02-07 VITALS
SYSTOLIC BLOOD PRESSURE: 110 MMHG | WEIGHT: 131 LBS | HEIGHT: 65 IN | DIASTOLIC BLOOD PRESSURE: 72 MMHG | BODY MASS INDEX: 21.83 KG/M2

## 2019-02-07 DIAGNOSIS — F41.9 ANXIETY: ICD-10-CM

## 2019-02-07 DIAGNOSIS — Z87.898 HISTORY OF SEIZURE: ICD-10-CM

## 2019-02-07 DIAGNOSIS — F51.01 PRIMARY INSOMNIA: Primary | ICD-10-CM

## 2019-02-07 PROCEDURE — 99213 OFFICE O/P EST LOW 20 MIN: CPT | Performed by: INTERNAL MEDICINE

## 2019-02-07 RX ORDER — LEVONORGESTREL AND ETHINYL ESTRADIOL AND ETHINYL ESTRADIOL 150-30(84)
1 KIT ORAL DAILY
COMMUNITY
Start: 2019-02-05 | End: 2019-07-25

## 2019-02-07 NOTE — PROGRESS NOTES
"Subjective   Sharron Guardado is a 39 y.o. female here for   Chief Complaint   Patient presents with   • Insomnia     4 month follow-up   • Anxiety   .    Vitals:    02/07/19 1500   BP: 110/72   BP Location: Left arm   Patient Position: Sitting   Cuff Size: Adult   Weight: 59.4 kg (131 lb)   Height: 165.7 cm (65.25\")       Body mass index is 21.63 kg/m².    Insomnia   This is a chronic problem. The current episode started more than 1 year ago. The problem occurs constantly. The problem has been unchanged. Pertinent negatives include no chest pain, chills, coughing, fatigue or fever.   Anxiety   Presents for follow-up visit. Symptoms include insomnia and nervous/anxious behavior (mild). Patient reports no chest pain, palpitations or shortness of breath. Symptoms occur occasionally. The severity of symptoms is mild.            The following portions of the patient's history were reviewed and updated as appropriate: allergies, current medications, past social history and problem list.    Review of Systems   Constitutional: Negative for chills, fatigue and fever.   Respiratory: Negative for cough, shortness of breath and wheezing.    Cardiovascular: Negative for chest pain, palpitations and leg swelling.   Psychiatric/Behavioral: Positive for sleep disturbance. Negative for dysphoric mood. The patient is nervous/anxious (mild) and has insomnia.        Objective   Physical Exam   Constitutional: She appears well-developed and well-nourished. No distress.   Cardiovascular: Normal rate, regular rhythm and normal heart sounds.   Pulmonary/Chest: No respiratory distress. She has no wheezes. She has no rales. She exhibits no tenderness.   Musculoskeletal: She exhibits no edema.   Psychiatric: She has a normal mood and affect. Her behavior is normal.   Nursing note and vitals reviewed.      Assessment/Plan   Diagnoses and all orders for this visit:    Primary insomnia  Comments:  she still need gabapentin & " ambien    Anxiety  Comments:  rarely uses valium - not at same time as ambien    History of seizure  Comments:  no seizures off keppra for 2-3 mos    Other orders  -     ASHLYNA 0.15-0.03 &0.01 MG tablet; Take 1 tablet by mouth Daily.

## 2019-03-13 DIAGNOSIS — F51.01 PRIMARY INSOMNIA: ICD-10-CM

## 2019-03-13 RX ORDER — ZOLPIDEM TARTRATE 10 MG/1
TABLET ORAL
Qty: 30 TABLET | Refills: 3 | OUTPATIENT
Start: 2019-03-13 | End: 2019-07-25 | Stop reason: SDUPTHER

## 2019-04-13 DIAGNOSIS — F51.01 PRIMARY INSOMNIA: ICD-10-CM

## 2019-04-15 RX ORDER — ZOLPIDEM TARTRATE 10 MG/1
TABLET ORAL
Qty: 30 TABLET | Refills: 0 | OUTPATIENT
Start: 2019-04-15

## 2019-04-15 NOTE — TELEPHONE ENCOUNTER
Denied Rx- it was phoned in 3/13/19 # [phone] and it was approved for 3 additional refill. I called the pharmacy to add the 3 refills that was approved by Dr. Mercer.

## 2019-04-18 DIAGNOSIS — F51.01 PRIMARY INSOMNIA: ICD-10-CM

## 2019-04-18 DIAGNOSIS — M54.2 NECK PAIN: ICD-10-CM

## 2019-04-19 RX ORDER — GABAPENTIN 600 MG/1
TABLET ORAL
Qty: 90 TABLET | Refills: 1 | OUTPATIENT
Start: 2019-04-19 | End: 2019-10-15 | Stop reason: SDUPTHER

## 2019-07-20 DIAGNOSIS — F51.01 PRIMARY INSOMNIA: ICD-10-CM

## 2019-07-23 RX ORDER — ZOLPIDEM TARTRATE 10 MG/1
TABLET ORAL
Qty: 30 TABLET | Refills: 0 | OUTPATIENT
Start: 2019-07-23

## 2019-07-25 ENCOUNTER — OFFICE VISIT (OUTPATIENT)
Dept: INTERNAL MEDICINE | Facility: CLINIC | Age: 39
End: 2019-07-25

## 2019-07-25 VITALS
SYSTOLIC BLOOD PRESSURE: 124 MMHG | HEIGHT: 65 IN | HEART RATE: 66 BPM | OXYGEN SATURATION: 99 % | DIASTOLIC BLOOD PRESSURE: 70 MMHG | BODY MASS INDEX: 22.76 KG/M2 | WEIGHT: 136.6 LBS

## 2019-07-25 DIAGNOSIS — G40.909 SEIZURE DISORDER (HCC): ICD-10-CM

## 2019-07-25 DIAGNOSIS — M54.2 NECK PAIN: ICD-10-CM

## 2019-07-25 DIAGNOSIS — F51.01 PRIMARY INSOMNIA: Primary | ICD-10-CM

## 2019-07-25 PROCEDURE — 99213 OFFICE O/P EST LOW 20 MIN: CPT | Performed by: NURSE PRACTITIONER

## 2019-07-25 RX ORDER — ZOLPIDEM TARTRATE 10 MG/1
10 TABLET ORAL NIGHTLY PRN
Qty: 30 TABLET | Refills: 0 | OUTPATIENT
Start: 2019-07-25 | End: 2019-08-25 | Stop reason: SDUPTHER

## 2019-07-25 RX ORDER — NORGESTIMATE AND ETHINYL ESTRADIOL 7DAYSX3 28
KIT ORAL
Refills: 1 | COMMUNITY
Start: 2019-07-19

## 2019-07-25 NOTE — PROGRESS NOTES
Subjective   Sharron Guardado is a 39 y.o. female.     She is here for prescription refill for ambien.       Insomnia   This is a chronic problem. The current episode started more than 1 year ago. The problem occurs constantly. Associated symptoms include neck pain. Pertinent negatives include no chest pain, coughing, fatigue, fever or headaches. Associated symptoms comments: Constant thinking . Treatments tried: ambien         The following portions of the patient's history were reviewed and updated as appropriate: allergies, current medications, past family history, past medical history, past social history, past surgical history and problem list.    Review of Systems   Constitutional: Negative for activity change, appetite change, fatigue and fever.   Respiratory: Negative for cough, shortness of breath and wheezing.    Cardiovascular: Negative for chest pain, palpitations and leg swelling.   Musculoskeletal: Positive for neck pain. Neck stiffness: chronic, takes gabapentin    Neurological: Positive for seizures (last seizure 6/27/2019, no treatment ). Negative for dizziness and headaches.   Psychiatric/Behavioral: Positive for sleep disturbance (takes ambien ). Negative for decreased concentration, dysphoric mood and suicidal ideas. The patient has insomnia. The patient is not nervous/anxious.        Objective   Physical Exam   Constitutional: She is oriented to person, place, and time. She appears well-developed and well-nourished.   HENT:   Head: Normocephalic.   Nose: Nose normal.   Neck: Trachea normal. Carotid bruit is not present. No thyroid mass and no thyromegaly present.   Cardiovascular: Regular rhythm and normal heart sounds. Exam reveals no S3 and no S4.   No murmur heard.  Pulmonary/Chest: Effort normal and breath sounds normal. She has no decreased breath sounds. She has no wheezes. She has no rhonchi. She has no rales.   Musculoskeletal: She exhibits no edema.        Cervical back: She exhibits  normal range of motion, no tenderness, no pain and no spasm.   Equal  strength    Neurological: She is alert and oriented to person, place, and time. Gait normal.   Skin: Skin is warm and dry.   Psychiatric: She has a normal mood and affect. Her speech is normal and behavior is normal. Judgment and thought content normal. Cognition and memory are normal.       Assessment/Plan   Sharron was seen today for insomnia.    Diagnoses and all orders for this visit:    Primary insomnia  Comments:  takes ambien nightly -typically 1/4 to 1/2 tablet   Orders:  -     zolpidem (AMBIEN) 10 MG tablet; Take 1 tablet by mouth At Night As Needed for Sleep.    Neck pain  Comments:  chronic, using gabapentin prn     Seizure disorder (CMS/HCC)  Comments:  using cbd oil weekly       Needs to schedule physical with fasting labs with dr vera

## 2019-08-25 DIAGNOSIS — F51.01 PRIMARY INSOMNIA: ICD-10-CM

## 2019-08-26 RX ORDER — ZOLPIDEM TARTRATE 10 MG/1
TABLET ORAL
Qty: 30 TABLET | Refills: 0 | Status: SHIPPED | OUTPATIENT
Start: 2019-08-26 | End: 2019-09-26 | Stop reason: SDUPTHER

## 2019-09-26 DIAGNOSIS — F51.01 PRIMARY INSOMNIA: ICD-10-CM

## 2019-09-27 RX ORDER — ZOLPIDEM TARTRATE 10 MG/1
TABLET ORAL
Qty: 30 TABLET | Refills: 1 | OUTPATIENT
Start: 2019-09-27 | End: 2019-12-01 | Stop reason: SDUPTHER

## 2019-10-15 DIAGNOSIS — F51.01 PRIMARY INSOMNIA: ICD-10-CM

## 2019-10-15 DIAGNOSIS — M54.2 NECK PAIN: ICD-10-CM

## 2019-10-15 RX ORDER — GABAPENTIN 600 MG/1
TABLET ORAL
Qty: 90 TABLET | Refills: 0 | OUTPATIENT
Start: 2019-10-15 | End: 2019-12-23

## 2019-11-06 ENCOUNTER — OFFICE VISIT (OUTPATIENT)
Dept: INTERNAL MEDICINE | Facility: CLINIC | Age: 39
End: 2019-11-06

## 2019-11-06 VITALS
TEMPERATURE: 97.6 F | OXYGEN SATURATION: 99 % | BODY MASS INDEX: 21.89 KG/M2 | WEIGHT: 131.4 LBS | HEIGHT: 65 IN | DIASTOLIC BLOOD PRESSURE: 88 MMHG | HEART RATE: 78 BPM | RESPIRATION RATE: 16 BRPM | SYSTOLIC BLOOD PRESSURE: 138 MMHG

## 2019-11-06 DIAGNOSIS — G40.909 SEIZURE DISORDER (HCC): ICD-10-CM

## 2019-11-06 DIAGNOSIS — Z00.00 ANNUAL PHYSICAL EXAM: Primary | ICD-10-CM

## 2019-11-06 DIAGNOSIS — F41.9 ANXIETY: ICD-10-CM

## 2019-11-06 DIAGNOSIS — F51.01 PRIMARY INSOMNIA: ICD-10-CM

## 2019-11-06 DIAGNOSIS — M54.2 NECK PAIN: ICD-10-CM

## 2019-11-06 DIAGNOSIS — Z13.220 ENCOUNTER FOR SCREENING FOR LIPID DISORDER: ICD-10-CM

## 2019-11-06 DIAGNOSIS — Z13.29 SCREENING FOR THYROID DISORDER: ICD-10-CM

## 2019-11-06 LAB
ALBUMIN SERPL-MCNC: 4.3 G/DL (ref 3.5–5.2)
ALBUMIN/GLOB SERPL: 1.6 G/DL
ALP SERPL-CCNC: 63 U/L (ref 39–117)
ALT SERPL W P-5'-P-CCNC: 16 U/L (ref 1–33)
ANION GAP SERPL CALCULATED.3IONS-SCNC: 11.9 MMOL/L (ref 5–15)
AST SERPL-CCNC: 17 U/L (ref 1–32)
BACTERIA UR QL AUTO: ABNORMAL /HPF
BASOPHILS # BLD AUTO: 0 10*3/MM3 (ref 0–0.2)
BASOPHILS NFR BLD AUTO: 0 % (ref 0–1.5)
BILIRUB SERPL-MCNC: 1.3 MG/DL (ref 0.2–1.2)
BILIRUB UR QL STRIP: NEGATIVE
BUN BLD-MCNC: 11 MG/DL (ref 6–20)
BUN/CREAT SERPL: 13.8 (ref 7–25)
CALCIUM SPEC-SCNC: 9.6 MG/DL (ref 8.6–10.5)
CHLORIDE SERPL-SCNC: 102 MMOL/L (ref 98–107)
CHOLEST SERPL-MCNC: 172 MG/DL (ref 0–200)
CLARITY UR: CLEAR
CO2 SERPL-SCNC: 25.1 MMOL/L (ref 22–29)
COLOR UR: YELLOW
CREAT BLD-MCNC: 0.8 MG/DL (ref 0.57–1)
DEPRECATED RDW RBC AUTO: 41.8 FL (ref 37–54)
EOSINOPHIL # BLD AUTO: 0.28 10*3/MM3 (ref 0–0.4)
EOSINOPHIL NFR BLD AUTO: 3.9 % (ref 0.3–6.2)
ERYTHROCYTE [DISTWIDTH] IN BLOOD BY AUTOMATED COUNT: 12 % (ref 12.3–15.4)
GFR SERPL CREATININE-BSD FRML MDRD: 80 ML/MIN/1.73
GLOBULIN UR ELPH-MCNC: 2.7 GM/DL
GLUCOSE BLD-MCNC: 92 MG/DL (ref 65–99)
GLUCOSE UR STRIP-MCNC: NEGATIVE MG/DL
HCT VFR BLD AUTO: 39.6 % (ref 34–46.6)
HDLC SERPL-MCNC: 84 MG/DL (ref 40–60)
HGB BLD-MCNC: 13.1 G/DL (ref 12–15.9)
HGB UR QL STRIP.AUTO: ABNORMAL
HYALINE CASTS UR QL AUTO: ABNORMAL /LPF
KETONES UR QL STRIP: NEGATIVE
LDLC SERPL CALC-MCNC: 64 MG/DL (ref 0–100)
LDLC/HDLC SERPL: 0.76 {RATIO}
LEUKOCYTE ESTERASE UR QL STRIP.AUTO: NEGATIVE
LYMPHOCYTES # BLD AUTO: 2.01 10*3/MM3 (ref 0.7–3.1)
LYMPHOCYTES NFR BLD AUTO: 28.3 % (ref 19.6–45.3)
MCH RBC QN AUTO: 32 PG (ref 26.6–33)
MCHC RBC AUTO-ENTMCNC: 33.1 G/DL (ref 31.5–35.7)
MCV RBC AUTO: 96.8 FL (ref 79–97)
MONOCYTES # BLD AUTO: 0.41 10*3/MM3 (ref 0.1–0.9)
MONOCYTES NFR BLD AUTO: 5.8 % (ref 5–12)
NEUTROPHILS # BLD AUTO: 4.41 10*3/MM3 (ref 1.7–7)
NEUTROPHILS NFR BLD AUTO: 62 % (ref 42.7–76)
NITRITE UR QL STRIP: NEGATIVE
PH UR STRIP.AUTO: 6.5 [PH] (ref 5–8)
PLATELET # BLD AUTO: 219 10*3/MM3 (ref 140–450)
PMV BLD AUTO: 10.3 FL (ref 6–12)
POTASSIUM BLD-SCNC: 4.8 MMOL/L (ref 3.5–5.2)
PROT SERPL-MCNC: 7 G/DL (ref 6–8.5)
PROT UR QL STRIP: NEGATIVE
RBC # BLD AUTO: 4.09 10*6/MM3 (ref 3.77–5.28)
RBC # UR: ABNORMAL /HPF
REF LAB TEST METHOD: ABNORMAL
SODIUM BLD-SCNC: 139 MMOL/L (ref 136–145)
SP GR UR STRIP: <=1.005 (ref 1–1.03)
SQUAMOUS #/AREA URNS HPF: ABNORMAL /HPF
TRIGL SERPL-MCNC: 119 MG/DL (ref 0–150)
TSH SERPL DL<=0.05 MIU/L-ACNC: 3.26 UIU/ML (ref 0.27–4.2)
UROBILINOGEN UR QL STRIP: ABNORMAL
VLDLC SERPL-MCNC: 23.8 MG/DL (ref 5–40)
WBC NRBC COR # BLD: 7.11 10*3/MM3 (ref 3.4–10.8)
WBC UR QL AUTO: ABNORMAL /HPF

## 2019-11-06 PROCEDURE — 80061 LIPID PANEL: CPT | Performed by: INTERNAL MEDICINE

## 2019-11-06 PROCEDURE — 85025 COMPLETE CBC W/AUTO DIFF WBC: CPT | Performed by: INTERNAL MEDICINE

## 2019-11-06 PROCEDURE — 80053 COMPREHEN METABOLIC PANEL: CPT | Performed by: INTERNAL MEDICINE

## 2019-11-06 PROCEDURE — 84443 ASSAY THYROID STIM HORMONE: CPT | Performed by: INTERNAL MEDICINE

## 2019-11-06 PROCEDURE — 81001 URINALYSIS AUTO W/SCOPE: CPT | Performed by: INTERNAL MEDICINE

## 2019-11-06 PROCEDURE — 99395 PREV VISIT EST AGE 18-39: CPT | Performed by: INTERNAL MEDICINE

## 2019-11-06 RX ORDER — ESCITALOPRAM OXALATE 10 MG/1
10 TABLET ORAL DAILY
Qty: 30 TABLET | Refills: 5 | Status: SHIPPED | OUTPATIENT
Start: 2019-11-06 | End: 2019-11-13

## 2019-11-06 NOTE — PROGRESS NOTES
"Subjective   Sharron Guardado is a 39 y.o. female here for   Chief Complaint   Patient presents with   • Annual Exam   • Anxiety   • Insomnia   • Seizures     Seizure on 10/27/19   .    Vitals:    11/06/19 0949 11/06/19 1053   BP: 142/90 138/88   BP Location: Left arm    Patient Position: Sitting    Cuff Size: Adult    Pulse: 78    Resp: 16    Temp: 97.6 °F (36.4 °C)    TempSrc: Temporal    SpO2: 99%    Weight: 59.6 kg (131 lb 6.4 oz)    Height: 165.7 cm (65.25\")        Body mass index is 21.7 kg/m².    Anxiety   Symptoms include chest pain (occ (from open ht surgery as infant)), insomnia, nervous/anxious behavior and palpitations (occ). Patient reports no confusion, decreased concentration, dizziness, nausea, shortness of breath or suicidal ideas.       Insomnia   Associated symptoms include chest pain (occ (from open ht surgery as infant)) and neck pain. Pertinent negatives include no abdominal pain, arthralgias, chills, congestion, coughing, fatigue, fever, headaches, joint swelling, myalgias, nausea, numbness, rash, sore throat, vomiting or weakness.   Seizures    Associated symptoms include chest pain (occ (from open ht surgery as infant)). Pertinent negatives include no confusion, no headaches, no speech difficulty, no visual disturbance, no sore throat, no cough, no nausea, no vomiting and no diarrhea.        The following portions of the patient's history were reviewed and updated as appropriate: allergies, current medications, past social history and problem list.    Review of Systems   Constitutional: Negative for appetite change, chills, fatigue, fever and unexpected weight change.   HENT: Negative for congestion, ear pain, mouth sores, sore throat, tinnitus, trouble swallowing and voice change.    Eyes: Negative for pain and visual disturbance.   Respiratory: Negative for cough, choking, shortness of breath and wheezing.    Cardiovascular: Positive for chest pain (occ (from open ht surgery as infant)) and " palpitations (occ). Negative for leg swelling.   Gastrointestinal: Negative for abdominal pain, blood in stool, constipation, diarrhea, nausea and vomiting.   Endocrine: Negative for cold intolerance, heat intolerance, polydipsia and polyuria.   Genitourinary: Negative for difficulty urinating, dysuria, enuresis, flank pain, frequency, hematuria and urgency.   Musculoskeletal: Positive for neck pain and neck stiffness. Negative for arthralgias, back pain, gait problem, joint swelling and myalgias.   Skin: Negative for color change and rash.   Allergic/Immunologic: Positive for environmental allergies. Negative for food allergies and immunocompromised state.   Neurological: Positive for seizures. Negative for dizziness, tremors, syncope, speech difficulty, weakness, numbness and headaches.   Hematological: Negative for adenopathy. Does not bruise/bleed easily.   Psychiatric/Behavioral: Positive for sleep disturbance. Negative for agitation, confusion, decreased concentration, dysphoric mood and suicidal ideas. The patient is nervous/anxious and has insomnia.        Objective   Physical Exam   Constitutional: She appears well-developed and well-nourished.   HENT:   Right Ear: Hearing, tympanic membrane, external ear and ear canal normal.   Left Ear: Hearing, tympanic membrane, external ear and ear canal normal.   Nose: Right sinus exhibits no maxillary sinus tenderness and no frontal sinus tenderness. Left sinus exhibits no maxillary sinus tenderness and no frontal sinus tenderness.   Eyes: Conjunctivae, EOM and lids are normal. Pupils are equal, round, and reactive to light.   Neck: Trachea normal. Neck supple. No JVD present. Carotid bruit is not present. No tracheal deviation present. No thyroid mass and no thyromegaly present.   Cardiovascular: Normal rate, regular rhythm, S1 normal and S2 normal. Exam reveals no gallop and no friction rub.   No murmur heard.  Pulses:       Carotid pulses are 2+ on the right side,  and 2+ on the left side.       Radial pulses are 2+ on the right side, and 2+ on the left side.        Dorsalis pedis pulses are 2+ on the right side, and 2+ on the left side.        Posterior tibial pulses are 2+ on the right side, and 2+ on the left side.   Pulmonary/Chest: Effort normal and breath sounds normal.   Abdominal: Soft. Normal aorta and bowel sounds are normal. She exhibits no abdominal bruit. There is no hepatosplenomegaly. There is no tenderness. There is no rebound and no guarding. No hernia.   Musculoskeletal: Normal range of motion. She exhibits no edema.   Lymphadenopathy:     She has no cervical adenopathy.     She has no axillary adenopathy.        Right: No supraclavicular adenopathy present.        Left: No supraclavicular adenopathy present.   Neurological: She is alert. She has normal strength. No cranial nerve deficit or sensory deficit. She displays a negative Romberg sign.   Reflex Scores:       Patellar reflexes are 2+ on the right side and 2+ on the left side.  Skin: Skin is warm and dry.   Nursing note and vitals reviewed.      Assessment/Plan   Diagnoses and all orders for this visit:    Annual physical exam  -     CBC w AUTO Differential; Future  -     Comprehensive metabolic panel; Future  -     Urinalysis With Culture If Indicated -; Future  -     CBC w AUTO Differential  -     Comprehensive metabolic panel  -     Urinalysis With Culture If Indicated - Urine, Clean Catch  -     CBC Auto Differential  -     Urinalysis, Microscopic Only - Urine, Clean Catch; Future  -     Urinalysis, Microscopic Only - Urine, Clean Catch    Encounter for screening for lipid disorder  -     Lipid panel; Future  -     Lipid panel    Screening for thyroid disorder  -     TSH Rfx On Abnormal To Free T4; Future  -     TSH Rfx On Abnormal To Free T4    Seizure disorder (CMS/HCC)  Comments:  refer to neuro (she only takes gabapentin occ) - she stopped keppra b/c didn't stop sz  Orders:  -     Ambulatory  Referral to Neurology    Anxiety  Comments:  worse - sister does well with lexapro & she wants trial (likely mild nausea in past with lexapro trial - only take at bedtime & call if problems)    Neck pain  Comments:  mild -call if worse - will see PT & get Xray    Primary insomnia  Comments:  black box warnings given - only take ambien 1/4-1/2 pill no more than 3x weekly -only if absolutely necessary - stop if ANY side effects    Other orders  -     escitalopram (LEXAPRO) 10 MG tablet; Take 1 tablet by mouth Daily. 1/2 pill tonight & tomorrow night - then 1qhs

## 2019-11-06 NOTE — PATIENT INSTRUCTIONS
Health Maintenance, Female  Adopting a healthy lifestyle and getting preventive care can go a long way to promote health and wellness. Talk with your health care provider about what schedule of regular examinations is right for you. This is a good chance for you to check in with your provider about disease prevention and staying healthy.  In between checkups, there are plenty of things you can do on your own. Experts have done a lot of research about which lifestyle changes and preventive measures are most likely to keep you healthy. Ask your health care provider for more information.  Weight and diet  Eat a healthy diet  · Be sure to include plenty of vegetables, fruits, low-fat dairy products, and lean protein.  · Do not eat a lot of foods high in solid fats, added sugars, or salt.  · Get regular exercise. This is one of the most important things you can do for your health.  ? Most adults should exercise for at least 150 minutes each week. The exercise should increase your heart rate and make you sweat (moderate-intensity exercise).  ? Most adults should also do strengthening exercises at least twice a week. This is in addition to the moderate-intensity exercise.  Maintain a healthy weight  · Body mass index (BMI) is a measurement that can be used to identify possible weight problems. It estimates body fat based on height and weight. Your health care provider can help determine your BMI and help you achieve or maintain a healthy weight.  · For females 20 years of age and older:  ? A BMI below 18.5 is considered underweight.  ? A BMI of 18.5 to 24.9 is normal.  ? A BMI of 25 to 29.9 is considered overweight.  ? A BMI of 30 and above is considered obese.  Watch levels of cholesterol and blood lipids  · You should start having your blood tested for lipids and cholesterol at 20 years of age, then have this test every 5 years.  · You may need to have your cholesterol levels checked more often if:  ? Your lipid or  cholesterol levels are high.  ? You are older than 50 years of age.  ? You are at high risk for heart disease.  Cancer screening  Lung Cancer  · Lung cancer screening is recommended for adults 55-80 years old who are at high risk for lung cancer because of a history of smoking.  · A yearly low-dose CT scan of the lungs is recommended for people who:  ? Currently smoke.  ? Have quit within the past 15 years.  ? Have at least a 30-pack-year history of smoking. A pack year is smoking an average of one pack of cigarettes a day for 1 year.  · Yearly screening should continue until it has been 15 years since you quit.  · Yearly screening should stop if you develop a health problem that would prevent you from having lung cancer treatment.  Breast Cancer  · Practice breast self-awareness. This means understanding how your breasts normally appear and feel.  · It also means doing regular breast self-exams. Let your health care provider know about any changes, no matter how small.  · If you are in your 20s or 30s, you should have a clinical breast exam (CBE) by a health care provider every 1-3 years as part of a regular health exam.  · If you are 40 or older, have a CBE every year. Also consider having a breast X-ray (mammogram) every year.  · If you have a family history of breast cancer, talk to your health care provider about genetic screening.  · If you are at high risk for breast cancer, talk to your health care provider about having an MRI and a mammogram every year.  · Breast cancer gene (BRCA) assessment is recommended for women who have family members with BRCA-related cancers. BRCA-related cancers include:  ? Breast.  ? Ovarian.  ? Tubal.  ? Peritoneal cancers.  · Results of the assessment will determine the need for genetic counseling and BRCA1 and BRCA2 testing.  Cervical Cancer  Your health care provider may recommend that you be screened regularly for cancer of the pelvic organs (ovaries, uterus, and vagina).  This screening involves a pelvic examination, including checking for microscopic changes to the surface of your cervix (Pap test). You may be encouraged to have this screening done every 3 years, beginning at age 21.  · For women ages 30-65, health care providers may recommend pelvic exams and Pap testing every 3 years, or they may recommend the Pap and pelvic exam, combined with testing for human papilloma virus (HPV), every 5 years. Some types of HPV increase your risk of cervical cancer. Testing for HPV may also be done on women of any age with unclear Pap test results.  · Other health care providers may not recommend any screening for nonpregnant women who are considered low risk for pelvic cancer and who do not have symptoms. Ask your health care provider if a screening pelvic exam is right for you.  · If you have had past treatment for cervical cancer or a condition that could lead to cancer, you need Pap tests and screening for cancer for at least 20 years after your treatment. If Pap tests have been discontinued, your risk factors (such as having a new sexual partner) need to be reassessed to determine if screening should resume. Some women have medical problems that increase the chance of getting cervical cancer. In these cases, your health care provider may recommend more frequent screening and Pap tests.  Colorectal Cancer  · This type of cancer can be detected and often prevented.  · Routine colorectal cancer screening usually begins at 50 years of age and continues through 75 years of age.  · Your health care provider may recommend screening at an earlier age if you have risk factors for colon cancer.  · Your health care provider may also recommend using home test kits to check for hidden blood in the stool.  · A small camera at the end of a tube can be used to examine your colon directly (sigmoidoscopy or colonoscopy). This is done to check for the earliest forms of colorectal cancer.  · Routine  screening usually begins at age 50.  · Direct examination of the colon should be repeated every 5-10 years through 75 years of age. However, you may need to be screened more often if early forms of precancerous polyps or small growths are found.  Skin Cancer  · Check your skin from head to toe regularly.  · Tell your health care provider about any new moles or changes in moles, especially if there is a change in a mole's shape or color.  · Also tell your health care provider if you have a mole that is larger than the size of a pencil eraser.  · Always use sunscreen. Apply sunscreen liberally and repeatedly throughout the day.  · Protect yourself by wearing long sleeves, pants, a wide-brimmed hat, and sunglasses whenever you are outside.  Heart disease, diabetes, and high blood pressure  · High blood pressure causes heart disease and increases the risk of stroke. High blood pressure is more likely to develop in:  ? People who have blood pressure in the high end of the normal range (130-139/85-89 mm Hg).  ? People who are overweight or obese.  ? People who are .  · If you are 18-39 years of age, have your blood pressure checked every 3-5 years. If you are 40 years of age or older, have your blood pressure checked every year. You should have your blood pressure measured twice--once when you are at a hospital or clinic, and once when you are not at a hospital or clinic. Record the average of the two measurements. To check your blood pressure when you are not at a hospital or clinic, you can use:  ? An automated blood pressure machine at a pharmacy.  ? A home blood pressure monitor.  · If you are between 55 years and 79 years old, ask your health care provider if you should take aspirin to prevent strokes.  · Have regular diabetes screenings. This involves taking a blood sample to check your fasting blood sugar level.  ? If you are at a normal weight and have a low risk for diabetes, have this test once  every three years after 45 years of age.  ? If you are overweight and have a high risk for diabetes, consider being tested at a younger age or more often.  Preventing infection  Hepatitis B  · If you have a higher risk for hepatitis B, you should be screened for this virus. You are considered at high risk for hepatitis B if:  ? You were born in a country where hepatitis B is common. Ask your health care provider which countries are considered high risk.  ? Your parents were born in a high-risk country, and you have not been immunized against hepatitis B (hepatitis B vaccine).  ? You have HIV or AIDS.  ? You use needles to inject street drugs.  ? You live with someone who has hepatitis B.  ? You have had sex with someone who has hepatitis B.  ? You get hemodialysis treatment.  ? You take certain medicines for conditions, including cancer, organ transplantation, and autoimmune conditions.  Hepatitis C  · Blood testing is recommended for:  ? Everyone born from 1945 through 1965.  ? Anyone with known risk factors for hepatitis C.  Sexually transmitted infections (STIs)  · You should be screened for sexually transmitted infections (STIs) including gonorrhea and chlamydia if:  ? You are sexually active and are younger than 24 years of age.  ? You are older than 24 years of age and your health care provider tells you that you are at risk for this type of infection.  ? Your sexual activity has changed since you were last screened and you are at an increased risk for chlamydia or gonorrhea. Ask your health care provider if you are at risk.  · If you do not have HIV, but are at risk, it may be recommended that you take a prescription medicine daily to prevent HIV infection. This is called pre-exposure prophylaxis (PrEP). You are considered at risk if:  ? You are sexually active and do not regularly use condoms or know the HIV status of your partner(s).  ? You take drugs by injection.  ? You are sexually active with a partner  who has HIV.  Talk with your health care provider about whether you are at high risk of being infected with HIV. If you choose to begin PrEP, you should first be tested for HIV. You should then be tested every 3 months for as long as you are taking PrEP.  Pregnancy  · If you are premenopausal and you may become pregnant, ask your health care provider about preconception counseling.  · If you may become pregnant, take 400 to 800 micrograms (mcg) of folic acid every day.  · If you want to prevent pregnancy, talk to your health care provider about birth control (contraception).  Osteoporosis and menopause  · Osteoporosis is a disease in which the bones lose minerals and strength with aging. This can result in serious bone fractures. Your risk for osteoporosis can be identified using a bone density scan.  · If you are 65 years of age or older, or if you are at risk for osteoporosis and fractures, ask your health care provider if you should be screened.  · Ask your health care provider whether you should take a calcium or vitamin D supplement to lower your risk for osteoporosis.  · Menopause may have certain physical symptoms and risks.  · Hormone replacement therapy may reduce some of these symptoms and risks.  Talk to your health care provider about whether hormone replacement therapy is right for you.  Follow these instructions at home:  · Schedule regular health, dental, and eye exams.  · Stay current with your immunizations.  · Do not use any tobacco products including cigarettes, chewing tobacco, or electronic cigarettes.  · If you are pregnant, do not drink alcohol.  · If you are breastfeeding, limit how much and how often you drink alcohol.  · Limit alcohol intake to no more than 1 drink per day for nonpregnant women. One drink equals 12 ounces of beer, 5 ounces of wine, or 1½ ounces of hard liquor.  · Do not use street drugs.  · Do not share needles.  · Ask your health care provider for help if you need support  or information about quitting drugs.  · Tell your health care provider if you often feel depressed.  · Tell your health care provider if you have ever been abused or do not feel safe at home.  This information is not intended to replace advice given to you by your health care provider. Make sure you discuss any questions you have with your health care provider.  Document Released: 07/02/2012 Document Revised: 05/25/2017 Document Reviewed: 09/20/2016  [employer] Interactive Patient Education © 2019 [employer]

## 2019-11-12 ENCOUNTER — TELEPHONE (OUTPATIENT)
Dept: INTERNAL MEDICINE | Facility: CLINIC | Age: 39
End: 2019-11-12

## 2019-11-12 NOTE — TELEPHONE ENCOUNTER
Detailed message left on v/m of Dr. Mercer's message and I also provided Ms. Guardado with contact info [facility]

## 2019-11-12 NOTE — TELEPHONE ENCOUNTER
PT CALLED IN STATING THAT SHE WAS JUST WANTING TO LEAVE A PT FOR THE DOCTOR. STATES THAT SHE WAS PRESCRIBED LEXAPRO FOR ANXIETY BUT THE MEDICATION WAS GIVING HER HEADACHES AND NAUSEA. SAID THAT WHILE TAKING THE MEDICATION SHE EXPERIENCED TWO BACK TO BACK SEIZURES. SHE SPOKE TO PHARMACIST WHO HAD RECOMMENDED THAT SHE STOP TAKING THE MEDICATION IMMEDIATELY. PT WANTED TO LET THE DOCTOR KNOW THAT SHE HAS COMPLETELY STOPPED TAKING THE MEDICATION.

## 2019-11-12 NOTE — TELEPHONE ENCOUNTER
Ok to stay off lexapro - she may see counselors at [facility] for advice on life & possible other medications

## 2019-12-01 DIAGNOSIS — F51.01 PRIMARY INSOMNIA: ICD-10-CM

## 2019-12-02 RX ORDER — ZOLPIDEM TARTRATE 10 MG/1
TABLET ORAL
Qty: 30 TABLET | Refills: 0 | OUTPATIENT
Start: 2019-12-02 | End: 2020-01-03

## 2019-12-02 NOTE — TELEPHONE ENCOUNTER
Please review refill request:    Last OV: 11/6/19    Last Prescribed: 9/27/19    KOAT: Good until 1/15/20    Thank you,    Von

## 2019-12-04 ENCOUNTER — TELEPHONE (OUTPATIENT)
Dept: INTERNAL MEDICINE | Facility: CLINIC | Age: 39
End: 2019-12-04

## 2019-12-04 NOTE — TELEPHONE ENCOUNTER
I spoke to Ms. Guardado and informed her that her forms were completed, signed, and faxed back to the Medical Review Office-Division of  Licensing.     Fax # [phone].    Patient was advised per Dr. Mercer she has noted she is to be seizure free for 3 months per [place] law before she's able to drive again.     She is to also get a letter from her Neurologist when she go to them in January stating she is cleared to drive and provide our office a copy of this letter for our records.    Ms. Geo verbalized she understood and thank you.

## 2019-12-04 NOTE — TELEPHONE ENCOUNTER
Dr. Mercer has the forms for review once they have been reviewed and signed they will be faxed back to the number provided on the forms and patient will be informed.

## 2019-12-04 NOTE — TELEPHONE ENCOUNTER
PT CALLED IN TODAY REQUESTING THE STATUS ON HER MEDICAL EXAMINATION FORM PT STATED IF THIS FORM IS NOT COMPLETED OF FAXED INTO THE DEPT OF TRANSPORTATION BY 12/15/19 THE PT STATES THEY WILL TAKE HER DRIVERS LICENSE    PT CB NUMBER: [phone]

## 2019-12-04 NOTE — TELEPHONE ENCOUNTER
Spoke with Oleg Dewey with the [facility] and provided clinic fax, asked to take a message incase we have received this and it is just not yet made it to the chart as it could be worked on.   Please advise patient if we have received this.

## 2019-12-22 DIAGNOSIS — F51.01 PRIMARY INSOMNIA: ICD-10-CM

## 2019-12-22 DIAGNOSIS — M54.2 NECK PAIN: ICD-10-CM

## 2019-12-23 RX ORDER — GABAPENTIN 600 MG/1
TABLET ORAL
Qty: 30 TABLET | Refills: 2 | OUTPATIENT
Start: 2019-12-23 | End: 2020-08-11

## 2020-01-01 DIAGNOSIS — F51.01 PRIMARY INSOMNIA: ICD-10-CM

## 2020-01-03 RX ORDER — ZOLPIDEM TARTRATE 10 MG/1
TABLET ORAL
Qty: 30 TABLET | Refills: 2 | OUTPATIENT
Start: 2020-01-03 | End: 2020-04-03

## 2020-01-15 ENCOUNTER — TELEPHONE (OUTPATIENT)
Dept: INTERNAL MEDICINE | Facility: CLINIC | Age: 40
End: 2020-01-15

## 2020-01-15 NOTE — TELEPHONE ENCOUNTER
Ms. Guardado will fax the form over to me on 1/27/2020 for review and completion to have her license reinstated.

## 2020-01-15 NOTE — TELEPHONE ENCOUNTER
PT called to reschedule her follow up, scheduled for 3/2020, to an earlier date. PT is now scheduled for 2/6/2020.    PT states she'll be eligible to get her license back on 1/28/19 and needs Dr. Mercer to complete a clearance form for her. PT is wondering if she could bring the form in before her visit on 2/6/2020 or if she should just wait and bring the form with her to the scheduled appointment.     Please call Sharron back: [phone]

## 2020-01-24 ENCOUNTER — OFFICE VISIT (OUTPATIENT)
Dept: NEUROLOGY | Facility: CLINIC | Age: 40
End: 2020-01-24

## 2020-01-24 ENCOUNTER — TELEPHONE (OUTPATIENT)
Dept: NEUROLOGY | Facility: CLINIC | Age: 40
End: 2020-01-24

## 2020-01-24 VITALS
WEIGHT: 134 LBS | HEIGHT: 65 IN | BODY MASS INDEX: 22.33 KG/M2 | HEART RATE: 73 BPM | OXYGEN SATURATION: 99 % | SYSTOLIC BLOOD PRESSURE: 120 MMHG | DIASTOLIC BLOOD PRESSURE: 78 MMHG

## 2020-01-24 DIAGNOSIS — G40.009 PARTIAL IDIOPATHIC EPILEPSY WITH SEIZURES OF LOCALIZED ONSET, NOT INTRACTABLE, WITHOUT STATUS EPILEPTICUS (HCC): Primary | ICD-10-CM

## 2020-01-24 PROCEDURE — 99244 OFF/OP CNSLTJ NEW/EST MOD 40: CPT | Performed by: PSYCHIATRY & NEUROLOGY

## 2020-01-24 RX ORDER — FOLIC ACID 1 MG/1
1 TABLET ORAL DAILY
Qty: 90 TABLET | Refills: 3 | Status: SHIPPED | OUTPATIENT
Start: 2020-01-24 | End: 2021-01-12

## 2020-01-24 RX ORDER — LEVETIRACETAM 500 MG/1
500 TABLET ORAL 2 TIMES DAILY
Qty: 180 TABLET | Refills: 3 | Status: SHIPPED | OUTPATIENT
Start: 2020-01-24 | End: 2020-08-21 | Stop reason: SDUPTHER

## 2020-01-24 RX ORDER — LEVETIRACETAM 500 MG/1
500 TABLET ORAL 2 TIMES DAILY
COMMUNITY
End: 2020-01-24 | Stop reason: SDUPTHER

## 2020-01-24 RX ORDER — HYDROCODONE BITARTRATE AND ACETAMINOPHEN 5; 325 MG/1; MG/1
TABLET ORAL
COMMUNITY
Start: 2019-11-21 | End: 2020-01-24

## 2020-01-24 RX ORDER — NORELGESTROMIN AND ETHINYL ESTRADIOL 150; 35 UG/D; UG/D
PATCH TRANSDERMAL
COMMUNITY
Start: 2020-01-14

## 2020-01-24 NOTE — PATIENT INSTRUCTIONS
[facility]  Mary Pascual MD  Neurology clinic  [phone]    With anti-seizure medications, you may initially notice side effects of fatigue, drowsiness, unsteadiness, and dizziness.  Other possible side effects include nausea, abdominal pain, headache, blurry or double vision, slurred speech and mood changes.  Generally, patients will noticed these symptoms when the medication is first started or with higher doses and will go away with time.    It is import to consistently take your medication every day.  Missing just one dose may put you at risk for a breakthrough seizure.  Consider using reminders on your phone or a pill box.    If you develop a rash, please call the neurology clinic immediately or notify another healthcare professional, as this may be potentially life-threatening.  If you are unable to reach a healthcare professional, go to the emergency room immediately for further evaluation.    If you develop thoughts of wanting to hurt yourself or others, please call the neurology clinic immediately to notify another healthcare professional.  If you are unable to reach a healthcare professional, go to the emergency room immediately for further evaluation.    Taking anti-seizure medications may increase the risk of birth defects.  If you are a female of child-bearing potential, it is recommended that you take folic acid (1-4 mg) daily.  This may reduce the risk of birth defects while pregnant and taking seizure medication.  If you become pregnant, contact our office immediately. You will need to be followed very closely (at least monthly appointments).  I also recommend contacting The North American Antiepileptic Drug Pregnancy Registry at www.aedpregnancyregistry.org or 1-168.910.2334.    It is the [place] state law that you cannot drive within 90 days of a seizure.    You should avoid certain activities that if you were to have a seizure, you could harm yourself or others. In  general, it is recommended that you avoid operating heavy machinery or power tools, swimming or taking baths by yourself (showers are ok), don't stand over open flames, don't get on high ladders or the roof.  I also recommend to avoid sleeping on your stomach.    For further information on epilepsy and resources available to patients and their families, please visit the [facility] at www.efky.org or call [phone].    **Check out the [facility]'s monthly Art Group Gathering.  The next one is 1/17/20 from 10 am to 3 pm at [facility], [address] [place] [address].  Call Cassi Reyes at [phone] or email her at [email or web address] for the dates of future gatherings.**      **If you have having memory problems, consider HOBSCOTCH (Home-Based Self-management and Cognitive Training Changes lives).  It is an 8 week self-management program for adults with epilepsy and memory problems.  The program is free at the [facility].  Contact Naila Davis at [phone] or [email or web address].**

## 2020-01-24 NOTE — TELEPHONE ENCOUNTER
I called it in.  I didn't send it electronically.  I can't send those electronically because the instructions are too long.

## 2020-01-24 NOTE — TELEPHONE ENCOUNTER
Please advise. I referenced back to you office visit today and noticed where you mentioned [place]. Thank you.
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
## 2020-01-24 NOTE — TELEPHONE ENCOUNTER
----- Message from Estela Vera MA sent at 1/24/2020 12:04 PM EST -----  Pt sts Dr Pascual told her she would send an rx for a nasal spray for her szs that last more than 5 mins.  Please call her back.    Thank you

## 2020-01-27 ENCOUNTER — TELEPHONE (OUTPATIENT)
Dept: INTERNAL MEDICINE | Facility: CLINIC | Age: 40
End: 2020-01-27

## 2020-01-27 NOTE — TELEPHONE ENCOUNTER
Patient would like a call when fax for transportation cabinet is received and filled out. Please advise [phone].

## 2020-01-29 ENCOUNTER — OFFICE VISIT (OUTPATIENT)
Dept: INTERNAL MEDICINE | Facility: CLINIC | Age: 40
End: 2020-01-29

## 2020-01-29 VITALS
WEIGHT: 136.4 LBS | HEIGHT: 65 IN | DIASTOLIC BLOOD PRESSURE: 80 MMHG | BODY MASS INDEX: 22.73 KG/M2 | TEMPERATURE: 97.9 F | HEART RATE: 91 BPM | SYSTOLIC BLOOD PRESSURE: 120 MMHG | RESPIRATION RATE: 18 BRPM | OXYGEN SATURATION: 99 %

## 2020-01-29 DIAGNOSIS — F51.01 PRIMARY INSOMNIA: ICD-10-CM

## 2020-01-29 DIAGNOSIS — Z87.898 HISTORY OF SEIZURE: Primary | ICD-10-CM

## 2020-01-29 PROCEDURE — 99213 OFFICE O/P EST LOW 20 MIN: CPT | Performed by: INTERNAL MEDICINE

## 2020-01-29 NOTE — PROGRESS NOTES
"Subjective   Sharron Guardado is a 40 y.o. female here for   Chief Complaint   Patient presents with   • Seizures     10/27/2019- [employer] Reinstatement Completeion Forms   • Insomnia   .    Vitals:    01/29/20 0924   BP: 120/80   BP Location: Left arm   Patient Position: Sitting   Cuff Size: Adult   Pulse: 91   Resp: 18   Temp: 97.9 °F (36.6 °C)   TempSrc: Temporal   SpO2: 99%   Weight: 61.9 kg (136 lb 6.4 oz)   Height: 165.7 cm (65.25\")       Body mass index is 22.52 kg/m².    Seizures    This is a recurrent problem. The problem has been resolved. Pertinent negatives include no chest pain and no cough.   Insomnia   This is a chronic problem. The current episode started more than 1 year ago. The problem occurs constantly. The problem has been unchanged. Pertinent negatives include no chest pain, chills, coughing, fatigue or fever.        The following portions of the patient's history were reviewed and updated as appropriate: allergies, current medications, past social history and problem list.    Review of Systems   Constitutional: Negative for chills, fatigue and fever.   Respiratory: Negative for cough, shortness of breath and wheezing.    Cardiovascular: Negative for chest pain, palpitations and leg swelling.   Neurological: Negative for seizures (none since 10/2019).   Psychiatric/Behavioral: Positive for sleep disturbance (1/2 every other night). Negative for dysphoric mood. The patient has insomnia. The patient is not nervous/anxious.        Objective   Physical Exam   Constitutional: She appears well-developed and well-nourished. No distress.   Cardiovascular: Normal rate, regular rhythm and normal heart sounds.   Pulmonary/Chest: No respiratory distress. She has no wheezes. She has no rales. She exhibits no tenderness.   Musculoskeletal: She exhibits no edema.   Psychiatric: She has a normal mood and affect. Her behavior is normal.   Nursing note and vitals reviewed.      Assessment/Plan   Diagnoses " and all orders for this visit:    History of seizure  Comments:  none since 10/2019-ok to drive per neuro (with keppra & folic acid)-no need for further tests-f/u with neuro 4/2020 - no driving if any sz or other neuro change    Primary insomnia  Comments:  ok with ambien 1/2 every other night - try to decrease to 1/4 - will change ambien to 5mg when needing refill     Driving license form filled out for her today.

## 2020-01-30 ENCOUNTER — TELEPHONE (OUTPATIENT)
Dept: INTERNAL MEDICINE | Facility: CLINIC | Age: 40
End: 2020-01-30

## 2020-01-30 NOTE — TELEPHONE ENCOUNTER
Call made to Ms. Guardado to inform her that I've faxed over her forms for [place] transportation.    She stated she understood and thank you.

## 2020-01-30 NOTE — TELEPHONE ENCOUNTER
Pt is calling regarding paperwork that needs to be faxed to Transportation cabinet stating she can drive.    Pt call back [phone]

## 2020-02-10 ENCOUNTER — TELEPHONE (OUTPATIENT)
Dept: INTERNAL MEDICINE | Facility: CLINIC | Age: 40
End: 2020-02-10

## 2020-02-10 NOTE — TELEPHONE ENCOUNTER
Office hasn't received this form.     Also there's no notation of patient having a reaction to flu vaccinations in current or previous chart.    Once form has been received Dr. Mercer will be provided this form for review.

## 2020-02-10 NOTE — TELEPHONE ENCOUNTER
She has seen neuro for seizure dx. I doubt that flu shot caused seizure, but I can write letter stating she feels that she had reaction to flu shot & is too concerned to repeat yearly flu vaccine.

## 2020-02-10 NOTE — TELEPHONE ENCOUNTER
Patient called stating her job is requiring a flu shot but when she got one it made her have A SEIZURE,patient is epileptic and if she sent a declamation form for a flu shot could her PCP fill it out. Please advise [phone] (H)

## 2020-02-11 NOTE — TELEPHONE ENCOUNTER
pls make letter - To Whom....she had an adverse reaction to flu shot in the past (seizure).  The risk outweighs the benefit for flu immunization. Thank you

## 2020-02-11 NOTE — TELEPHONE ENCOUNTER
I spoke to Ms. Guardado and she stated she would take the letter. She stated when this took place it was over 10 years ago and the next day she had a seizure and an additional seizure the next day.    She stated her employer was taken over by [employer] and they are requiring her to get this vaccine.    Please advise once the letter has been typed.    Thank you

## 2020-02-12 NOTE — TELEPHONE ENCOUNTER
Message left on v/m that her letter has been typed and signed and placed in the drawer at the  for her to .

## 2020-02-19 ENCOUNTER — APPOINTMENT (OUTPATIENT)
Dept: WOMENS IMAGING | Facility: HOSPITAL | Age: 40
End: 2020-02-19

## 2020-02-19 PROCEDURE — 77063 BREAST TOMOSYNTHESIS BI: CPT | Performed by: RADIOLOGY

## 2020-02-19 PROCEDURE — 77067 SCR MAMMO BI INCL CAD: CPT | Performed by: RADIOLOGY

## 2020-03-31 ENCOUNTER — TELEPHONE (OUTPATIENT)
Dept: NEUROLOGY | Facility: CLINIC | Age: 40
End: 2020-03-31

## 2020-04-03 DIAGNOSIS — F51.01 PRIMARY INSOMNIA: ICD-10-CM

## 2020-04-03 RX ORDER — ZOLPIDEM TARTRATE 10 MG/1
TABLET ORAL
Qty: 30 TABLET | Refills: 0 | Status: SHIPPED | OUTPATIENT
Start: 2020-04-03 | End: 2020-05-12

## 2020-04-03 NOTE — TELEPHONE ENCOUNTER
Please review refill request:    Last OV: 1/29/2020    Last Prescribed: 1/30/2020    KOTA: Ordered    Thank you,    Von

## 2020-05-09 DIAGNOSIS — F51.01 PRIMARY INSOMNIA: ICD-10-CM

## 2020-05-11 ENCOUNTER — TELEPHONE (OUTPATIENT)
Dept: INTERNAL MEDICINE | Facility: CLINIC | Age: 40
End: 2020-05-11

## 2020-05-12 RX ORDER — ZOLPIDEM TARTRATE 10 MG/1
TABLET ORAL
Qty: 30 TABLET | Refills: 0 | Status: SHIPPED | OUTPATIENT
Start: 2020-05-12 | End: 2020-06-12 | Stop reason: SDUPTHER

## 2020-05-28 ENCOUNTER — TELEPHONE (OUTPATIENT)
Dept: INTERNAL MEDICINE | Facility: CLINIC | Age: 40
End: 2020-05-28

## 2020-05-28 NOTE — TELEPHONE ENCOUNTER
PATIENT CALLED REGARDING HER APPT ON 06/09 @ 9:30. PT STATED SPOKE DR KHALIL ON THE PHONE AND FORGOT TO ASK HER IF STILL NEED TO COME TO HER APPT 06/09.    PLEASE ADVISE

## 2020-06-12 DIAGNOSIS — F51.01 PRIMARY INSOMNIA: ICD-10-CM

## 2020-06-12 RX ORDER — ZOLPIDEM TARTRATE 10 MG/1
10 TABLET ORAL NIGHTLY PRN
Qty: 30 TABLET | Refills: 0 | Status: SHIPPED | OUTPATIENT
Start: 2020-06-12 | End: 2020-08-13

## 2020-06-12 NOTE — TELEPHONE ENCOUNTER
Patient is calling to request a refill of the following:     zolpidem (AMBIEN) 10 MG tablet     [id number] - [address] AT [facility] [address] - [phone]  - [phone] FX  [phone] Confirmed      Patient is completely out of medication.    Patient can be reached [phone]

## 2020-06-12 NOTE — TELEPHONE ENCOUNTER
Please review refill request: Dr. Mercer Patient    Last OV: Telemedicine on 5/12/2020    Last Prescribed: 5/12/2020    KOTA: Good until 8/12/2020    Thank you,    Von

## 2020-08-11 DIAGNOSIS — F51.01 PRIMARY INSOMNIA: ICD-10-CM

## 2020-08-11 DIAGNOSIS — M54.2 NECK PAIN: ICD-10-CM

## 2020-08-11 RX ORDER — GABAPENTIN 600 MG/1
TABLET ORAL
Qty: 30 TABLET | Refills: 2 | Status: SHIPPED | OUTPATIENT
Start: 2020-08-11 | End: 2020-12-31

## 2020-08-12 DIAGNOSIS — F51.01 PRIMARY INSOMNIA: ICD-10-CM

## 2020-08-13 RX ORDER — ZOLPIDEM TARTRATE 10 MG/1
TABLET ORAL
Qty: 30 TABLET | Refills: 2 | Status: SHIPPED | OUTPATIENT
Start: 2020-08-13 | End: 2020-10-15

## 2020-08-13 NOTE — TELEPHONE ENCOUNTER
Pt was seen 1/29/20, next:10/6/20.    She was scheduled in June, but it says cancelled per provider, so her next appt is in October.  Mohsen ordered.

## 2020-08-21 ENCOUNTER — OFFICE VISIT (OUTPATIENT)
Dept: NEUROLOGY | Facility: CLINIC | Age: 40
End: 2020-08-21

## 2020-08-21 VITALS
WEIGHT: 143 LBS | HEIGHT: 65 IN | DIASTOLIC BLOOD PRESSURE: 72 MMHG | BODY MASS INDEX: 23.82 KG/M2 | HEART RATE: 82 BPM | OXYGEN SATURATION: 98 % | SYSTOLIC BLOOD PRESSURE: 110 MMHG

## 2020-08-21 DIAGNOSIS — G40.009 PARTIAL IDIOPATHIC EPILEPSY WITH SEIZURES OF LOCALIZED ONSET, NOT INTRACTABLE, WITHOUT STATUS EPILEPTICUS (HCC): Primary | ICD-10-CM

## 2020-08-21 PROCEDURE — 99213 OFFICE O/P EST LOW 20 MIN: CPT | Performed by: PSYCHIATRY & NEUROLOGY

## 2020-08-21 RX ORDER — LEVETIRACETAM 500 MG/1
TABLET ORAL
Qty: 225 TABLET | Refills: 3 | Status: SHIPPED | OUTPATIENT
Start: 2020-08-21 | End: 2021-11-29
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
## 2020-08-21 NOTE — PATIENT INSTRUCTIONS
[facility]  Mary Pascual MD  Neurology clinic  [phone]    With anti-seizure medications, you may initially notice side effects of fatigue, drowsiness, unsteadiness, and dizziness.  Other possible side effects include nausea, abdominal pain, headache, blurry or double vision, slurred speech and mood changes.  Generally, patients will noticed these symptoms when the medication is first started or with higher doses and will go away with time.    It is import to consistently take your medication every day.  Missing just one dose may put you at risk for a breakthrough seizure.  Consider using reminders on your phone or a pill box.    If you develop a rash, please call the neurology clinic immediately or notify another healthcare professional, as this may be potentially life-threatening.  If you are unable to reach a healthcare professional, go to the emergency room immediately for further evaluation.    If you develop thoughts of wanting to hurt yourself or others, please call the neurology clinic immediately to notify another healthcare professional.  If you are unable to reach a healthcare professional, go to the emergency room immediately for further evaluation.    Taking anti-seizure medications may increase the risk of birth defects.  If you are a female of child-bearing potential, it is recommended that you take folic acid (1-4 mg) daily.  This may reduce the risk of birth defects while pregnant and taking seizure medication.  If you become pregnant, contact our office immediately. You will need to be followed very closely (at least monthly appointments).  I also recommend contacting The North American Antiepileptic Drug Pregnancy Registry at www.aedpregnancyregistry.org or 1-956.647.8338.    It is the [place] state law that you cannot drive within 90 days of a seizure.    You should avoid certain activities that if you were to have a seizure, you could harm yourself or others. In  general, it is recommended that you avoid operating heavy machinery or power tools, swimming or taking baths by yourself (showers are ok), don't stand over open flames, don't get on high ladders or the roof.  I also recommend to avoid sleeping on your stomach.    For further information on epilepsy and resources available to patients and their families, please visit the [facility] at www.efky.org or call [phone].    **Check out the [facility]'s monthly Art Group Gathering.  They [facility] [place], [address] [place] [address].  Call Cassi Reyes at [phone] or email her at [email or web address] for the dates of future gatherings.**      **If you have having memory problems, consider HOBSCOTCH (Home-Based Self-management and Cognitive Training Changes lives).  It is an 8 week self-management program for adults with epilepsy and memory problems.  The program is free at the [facility].  Contact Naila Davis at [phone] or [email or web address].**    Check out My Epilepsy Story (MyEpilepsyStory.org).  Their goal is to foster the growth of young girls and women affected by epilepsy and encourage them not just to lives, but to THRIVE!  You can get involved by donating, sharing your story, or becoming an ambassador.  Services include educational resources and transportation.

## 2020-10-06 ENCOUNTER — OFFICE VISIT (OUTPATIENT)
Dept: INTERNAL MEDICINE | Facility: CLINIC | Age: 40
End: 2020-10-06

## 2020-10-06 VITALS
HEART RATE: 68 BPM | HEIGHT: 65 IN | WEIGHT: 142.6 LBS | SYSTOLIC BLOOD PRESSURE: 100 MMHG | DIASTOLIC BLOOD PRESSURE: 68 MMHG | OXYGEN SATURATION: 99 % | BODY MASS INDEX: 23.76 KG/M2 | TEMPERATURE: 98 F

## 2020-10-06 DIAGNOSIS — F51.01 PRIMARY INSOMNIA: Primary | ICD-10-CM

## 2020-10-06 DIAGNOSIS — Z87.898 HISTORY OF SEIZURE: ICD-10-CM

## 2020-10-06 PROCEDURE — 99213 OFFICE O/P EST LOW 20 MIN: CPT | Performed by: INTERNAL MEDICINE

## 2020-10-06 NOTE — PROGRESS NOTES
"Subjective   Sharron Guardado is a 40 y.o. female here for   Chief Complaint   Patient presents with   • Insomnia     4 month follow up   .    Vitals:    10/06/20 0946   BP: 100/68   BP Location: Left arm   Patient Position: Sitting   Cuff Size: Adult   Pulse: 68   Temp: 98 °F (36.7 °C)   SpO2: 99%   Weight: 64.7 kg (142 lb 9.6 oz)   Height: 165.7 cm (65.24\")       Body mass index is 23.56 kg/m².    Insomnia  This is a chronic problem. The current episode started more than 1 year ago. The problem occurs constantly. The problem has been unchanged. Pertinent negatives include no chest pain, chills, coughing, fatigue or fever.        The following portions of the patient's history were reviewed and updated as appropriate: allergies, current medications, past social history and problem list.    Review of Systems   Constitutional: Negative for chills, fatigue and fever.   Respiratory: Negative for cough, shortness of breath and wheezing.    Cardiovascular: Negative for chest pain, palpitations and leg swelling.   Psychiatric/Behavioral: Positive for sleep disturbance (ok with meds). Negative for dysphoric mood. The patient has insomnia. The patient is not nervous/anxious.        Objective   Physical Exam  Vitals signs and nursing note reviewed.   Constitutional:       General: She is not in acute distress.     Appearance: She is well-developed.   Cardiovascular:      Rate and Rhythm: Normal rate and regular rhythm.      Heart sounds: Normal heart sounds.   Pulmonary:      Effort: No respiratory distress.      Breath sounds: No wheezing or rales.   Chest:      Chest wall: No tenderness.   Psychiatric:         Behavior: Behavior normal.         Assessment/Plan   Diagnoses and all orders for this visit:    Primary insomnia  Comments:  still needing ambien 5mg qhs - no behavior side effects -stop if any problems    History of seizure  Comments:  no seizures -call if problems           "

## 2020-10-14 DIAGNOSIS — F51.01 PRIMARY INSOMNIA: ICD-10-CM

## 2020-10-15 RX ORDER — ZOLPIDEM TARTRATE 10 MG/1
TABLET ORAL
Qty: 30 TABLET | Refills: 0 | Status: SHIPPED | OUTPATIENT
Start: 2020-10-15 | End: 2020-12-11

## 2020-11-04 ENCOUNTER — TELEPHONE (OUTPATIENT)
Dept: INTERNAL MEDICINE | Facility: CLINIC | Age: 40
End: 2020-11-04

## 2020-11-04 NOTE — TELEPHONE ENCOUNTER
PATIENT CALLED AND STATED THAT SHE CANNOT HAVE THE FLU VACCINE DUE TO ADVERSE REACTIONS. THE PATIENT'S EMPLOYER NEEDS A LETTER FROM DR. KHALIL STATING THE REASON WHY SHE CANNOT HAVE THE FLU VACCINE. THE PATIENT STATED THAT DR. KHALIL HAD TO WRITE ONE LAST YEAR. PATIENT NEEDS THIS BY Friday 11/6, OR NO LATER THAN Monday 11/9.    PATIENT'S FAX NUMBER: [phone]    PATIENT'S CALLBACK: [phone]

## 2020-11-11 ENCOUNTER — TELEPHONE (OUTPATIENT)
Dept: INTERNAL MEDICINE | Facility: CLINIC | Age: 40
End: 2020-11-11

## 2020-11-11 NOTE — TELEPHONE ENCOUNTER
PATIENT CALLED AND STATES A LETTER IN REGARDS TO HER NOT GETTING A FLU SHOT HAS NOT BEEN RECEIVED. PLEASE REFAX TO   [phone] ATTN JOAQUIN    CALL BACK NUMBER [phone]

## 2020-11-11 NOTE — TELEPHONE ENCOUNTER
Call made to Ms. Guardado to inform her that I would fax the letter she need for work to the fax number provided.

## 2020-12-02 ENCOUNTER — TELEPHONE (OUTPATIENT)
Dept: INTERNAL MEDICINE | Facility: CLINIC | Age: 40
End: 2020-12-02

## 2020-12-02 NOTE — TELEPHONE ENCOUNTER
MS. GUARDADO SAYS SHE WAS TOLD TO CALL BACK WITH INFORMATION PERTAINING TO FORM SHE FAXED TO OFFICE (MEDICAL EXEMPTION FOR VACCINE) ALLERGY TO [place], SHE SAYS TO CHECK THE VERY [address] BOX ON FORM .         Sharron Guardado (Self) [phone] (H)

## 2020-12-11 DIAGNOSIS — F51.01 PRIMARY INSOMNIA: ICD-10-CM

## 2020-12-11 RX ORDER — ZOLPIDEM TARTRATE 10 MG/1
TABLET ORAL
Qty: 30 TABLET | Refills: 0 | Status: SHIPPED | OUTPATIENT
Start: 2020-12-11 | End: 2021-01-13

## 2020-12-23 ENCOUNTER — OFFICE VISIT (OUTPATIENT)
Dept: CARDIOLOGY | Facility: CLINIC | Age: 40
End: 2020-12-23

## 2020-12-23 VITALS
HEIGHT: 65 IN | BODY MASS INDEX: 21.83 KG/M2 | SYSTOLIC BLOOD PRESSURE: 114 MMHG | HEART RATE: 69 BPM | WEIGHT: 131 LBS | DIASTOLIC BLOOD PRESSURE: 74 MMHG

## 2020-12-23 DIAGNOSIS — Z87.74 STATUS POST REPAIR OF COARCTATION OF AORTA: ICD-10-CM

## 2020-12-23 DIAGNOSIS — R01.1 SYSTOLIC MURMUR: ICD-10-CM

## 2020-12-23 DIAGNOSIS — Q24.9 CONGENITAL HEART DISEASE: ICD-10-CM

## 2020-12-23 DIAGNOSIS — Z87.74 S/P VSD REPAIR: Primary | ICD-10-CM

## 2020-12-23 PROCEDURE — 93000 ELECTROCARDIOGRAM COMPLETE: CPT | Performed by: INTERNAL MEDICINE

## 2020-12-23 PROCEDURE — 99204 OFFICE O/P NEW MOD 45 MIN: CPT | Performed by: INTERNAL MEDICINE

## 2020-12-31 DIAGNOSIS — M54.2 NECK PAIN: ICD-10-CM

## 2020-12-31 DIAGNOSIS — F51.01 PRIMARY INSOMNIA: ICD-10-CM

## 2020-12-31 RX ORDER — GABAPENTIN 600 MG/1
TABLET ORAL
Qty: 30 TABLET | Refills: 1 | Status: SHIPPED | OUTPATIENT
Start: 2020-12-31 | End: 2021-03-19
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
## 2021-01-12 DIAGNOSIS — F51.01 PRIMARY INSOMNIA: ICD-10-CM

## 2021-01-12 RX ORDER — FOLIC ACID 1 MG/1
1 TABLET ORAL DAILY
Qty: 90 TABLET | Refills: 3 | Status: SHIPPED | OUTPATIENT
Start: 2021-01-12 | End: 2021-12-27

## 2021-01-13 RX ORDER — ZOLPIDEM TARTRATE 10 MG/1
TABLET ORAL
Qty: 30 TABLET | Refills: 0 | Status: SHIPPED | OUTPATIENT
Start: 2021-01-13 | End: 2021-02-16 | Stop reason: SDUPTHER

## 2021-01-28 ENCOUNTER — APPOINTMENT (OUTPATIENT)
Dept: CARDIOLOGY | Facility: HOSPITAL | Age: 41
End: 2021-01-28

## 2021-01-29 ENCOUNTER — APPOINTMENT (OUTPATIENT)
Dept: CT IMAGING | Facility: HOSPITAL | Age: 41
End: 2021-01-29

## 2021-02-16 ENCOUNTER — TELEPHONE (OUTPATIENT)
Dept: INTERNAL MEDICINE | Facility: CLINIC | Age: 41
End: 2021-02-16

## 2021-02-16 DIAGNOSIS — F51.01 PRIMARY INSOMNIA: ICD-10-CM

## 2021-02-16 RX ORDER — ZOLPIDEM TARTRATE 10 MG/1
TABLET ORAL
Qty: 30 TABLET | Refills: 0 | Status: SHIPPED | OUTPATIENT
Start: 2021-02-16 | End: 2021-02-17 | Stop reason: SDUPTHER

## 2021-02-16 NOTE — TELEPHONE ENCOUNTER
Caller: Sharron Guardado  Relationship: Self    Best call back number: [phone]    Medication needed:   Requested Prescriptions     Pending Prescriptions Disp Refills   • zolpidem (AMBIEN) 10 MG tablet 30 tablet 0     Sig: Take 1 tablet by mouth At Night As Needed for Sleep.       When do you need the refill by: 2/18    What details did the patient provide when requesting the medication: PATIENT ONLY HAS 1 LEFT    Does the patient have less than a 3 day supply:  [x] Yes  [] No    What is the patient's preferred pharmacy: [facility] #[id number] [facility] [address] [facility] AT [facility] - [phone] [facility] [phone] FX

## 2021-02-17 ENCOUNTER — TELEPHONE (OUTPATIENT)
Dept: INTERNAL MEDICINE | Facility: CLINIC | Age: 41
End: 2021-02-17

## 2021-02-17 DIAGNOSIS — F51.01 PRIMARY INSOMNIA: ICD-10-CM

## 2021-02-17 RX ORDER — ZOLPIDEM TARTRATE 10 MG/1
TABLET ORAL
Qty: 30 TABLET | Refills: 0 | Status: SHIPPED | OUTPATIENT
Start: 2021-02-17 | End: 2021-03-05

## 2021-02-17 NOTE — TELEPHONE ENCOUNTER
Caller: Sharron Guardado  Relationship: Self     Best call back number: [phone]     Medication needed:   Requested Prescriptions             Pending Prescriptions Disp Refills   • zolpidem (AMBIEN) 10 MG tablet 30 tablet 0       Sig: Take 1 tablet by mouth At Night As Needed for Sleep.         When do you need the refill by: 2/18     What details did the patient provide when requesting the medication: PATIENT ONLY HAS 1 LEFT     Does the patient have less than a 3 day supply:  [x]? Yes  []? No     What is the patient's preferred pharmacy: [id number] [address] AT [address] [facility] - [phone] [facility] [phone] FX      PATIENT CAN HAVE A VIDEO VISIT

## 2021-02-22 ENCOUNTER — OFFICE VISIT (OUTPATIENT)
Dept: INTERNAL MEDICINE | Facility: CLINIC | Age: 41
End: 2021-02-22

## 2021-02-22 VITALS
HEART RATE: 70 BPM | HEIGHT: 65 IN | WEIGHT: 134 LBS | BODY MASS INDEX: 22.33 KG/M2 | TEMPERATURE: 97.8 F | RESPIRATION RATE: 15 BRPM | OXYGEN SATURATION: 99 % | SYSTOLIC BLOOD PRESSURE: 120 MMHG | DIASTOLIC BLOOD PRESSURE: 84 MMHG

## 2021-02-22 DIAGNOSIS — F51.01 PRIMARY INSOMNIA: ICD-10-CM

## 2021-02-22 DIAGNOSIS — F51.01 PRIMARY INSOMNIA: Primary | ICD-10-CM

## 2021-02-22 PROCEDURE — 99213 OFFICE O/P EST LOW 20 MIN: CPT | Performed by: INTERNAL MEDICINE

## 2021-02-22 RX ORDER — ZOLPIDEM TARTRATE 10 MG/1
TABLET ORAL
Qty: 30 TABLET | OUTPATIENT
Start: 2021-02-22

## 2021-02-22 RX ORDER — ZOLPIDEM TARTRATE 10 MG/1
TABLET ORAL
Qty: 30 TABLET | Status: CANCELLED | OUTPATIENT
Start: 2021-02-22

## 2021-02-22 RX ORDER — LIDOCAINE AND PRILOCAINE 25; 25 MG/G; MG/G
1 CREAM TOPICAL AS NEEDED
COMMUNITY
Start: 2021-01-20 | End: 2021-06-25

## 2021-02-22 NOTE — TELEPHONE ENCOUNTER
Caller: [facility] #[id number] [facility] [address] AT [facility] - [phone] [facility] [phone]     Relationship to patient:     Best call back number: [phone]    Patient is needing: PATIENT WANTED TO LET DR. KHALIL KNOW THAT [facility] REQUIRES A PA FOR HER INSURANCE TO COVER.

## 2021-02-22 NOTE — PROGRESS NOTES
"Chief Complaint  Insomnia (4 month follow-up)    Subjective          Sharron Guardado presents to [facility] for   Insomnia  This is a chronic problem. The current episode started more than 1 year ago. The problem occurs constantly. The problem has been unchanged. Pertinent negatives include no chest pain, chills, coughing, fatigue or fever.       Review of Systems   Constitutional: Negative for chills, fatigue and fever.   Respiratory: Negative for cough, shortness of breath and wheezing.    Cardiovascular: Negative for chest pain, palpitations and leg swelling.   Psychiatric/Behavioral: Positive for sleep disturbance. Negative for dysphoric mood. The patient has insomnia. The patient is not nervous/anxious.         Objective   Vital Signs:   /84 (BP Location: Left arm, Patient Position: Sitting, Cuff Size: Adult)   Pulse 70   Temp 97.8 °F (36.6 °C) (Temporal)   Resp 15   Ht 165.7 cm (65.25\")   Wt 60.8 kg (134 lb)   SpO2 99%   BMI 22.13 kg/m²     Physical Exam  Vitals signs and nursing note reviewed.   Constitutional:       General: She is not in acute distress.     Appearance: She is well-developed.   Cardiovascular:      Rate and Rhythm: Normal rate and regular rhythm.      Heart sounds: Normal heart sounds.   Pulmonary:      Effort: No respiratory distress.      Breath sounds: No wheezing or rales.   Chest:      Chest wall: No tenderness.   Psychiatric:         Behavior: Behavior normal.        Result Review :                 Assessment and Plan    Problem List Items Addressed This Visit        Unprioritized    Insomnia - Primary    Current Assessment & Plan     Discussed again that ambien is addictive & best to avoid.  However, she cannot sleep at all without it. She has never had side effects & was warned again to stop immediately if any behavioral changes.               Follow Up   Return in about 4 months (around 6/22/2021) for Annual physical.  Patient was given " instructions and counseling regarding her condition or for health maintenance advice. Please see specific information pulled into the AVS if appropriate.

## 2021-02-22 NOTE — ASSESSMENT & PLAN NOTE
Discussed again that ambien is addictive & best to avoid.  However, she cannot sleep at all without it. She has never had side effects & was warned again to stop immediately if any behavioral changes.

## 2021-03-01 ENCOUNTER — TELEPHONE (OUTPATIENT)
Dept: INTERNAL MEDICINE | Facility: CLINIC | Age: 41
End: 2021-03-01

## 2021-03-05 DIAGNOSIS — F51.01 PRIMARY INSOMNIA: ICD-10-CM

## 2021-03-05 RX ORDER — ZOLPIDEM TARTRATE 10 MG/1
TABLET ORAL
Qty: 30 TABLET | Refills: 2 | Status: SHIPPED | OUTPATIENT
Start: 2021-03-05 | End: 2021-06-17

## 2021-03-16 ENCOUNTER — TELEPHONE (OUTPATIENT)
Dept: INTERNAL MEDICINE | Facility: CLINIC | Age: 41
End: 2021-03-16

## 2021-03-16 NOTE — TELEPHONE ENCOUNTER
PATIENT IS REQUESTING A CALL BACK FROM DR JAMES NURSE ABOUT MEDICATION QUESTIONS       CALL BACK:[phone]

## 2021-03-18 NOTE — TELEPHONE ENCOUNTER
I spoke to patient in regards to her Ambien and informed her I have been working on this PA x 2 weeks trying to get to the right department to provide verbal clinicals.    I told her I would keep her updated on the status    Please see PA encounter for continued updates    She verbalized she understood and thank you.

## 2021-03-19 DIAGNOSIS — M54.2 NECK PAIN: ICD-10-CM

## 2021-03-19 DIAGNOSIS — F51.01 PRIMARY INSOMNIA: ICD-10-CM

## 2021-03-19 RX ORDER — GABAPENTIN 600 MG/1
600 TABLET ORAL NIGHTLY PRN
Qty: 90 TABLET | Refills: 1 | Status: SHIPPED | OUTPATIENT
Start: 2021-03-19 | End: 2021-07-07 | Stop reason: SDUPTHER

## 2021-03-19 NOTE — TELEPHONE ENCOUNTER
Please review refill request:    Last OV: 2/22/2021    Next OV: 7/20/2021    Last Prescribed: 12/31/2020    KOTA: Provider to retrieve and review    Controlled Substance Agreement Form: Need to sign a new one    Thank you,    Von

## 2021-05-18 ENCOUNTER — HOSPITAL ENCOUNTER (OUTPATIENT)
Dept: CT IMAGING | Facility: HOSPITAL | Age: 41
Discharge: HOME | End: 2021-05-18
Admitting: INTERNAL MEDICINE

## 2021-05-18 PROCEDURE — 71275 CT ANGIOGRAPHY CHEST: CPT

## 2021-05-18 PROCEDURE — 0 IOPAMIDOL PER 1 ML: Performed by: INTERNAL MEDICINE

## 2021-05-18 RX ADMIN — IOPAMIDOL 100 ML: 755 INJECTION, SOLUTION INTRAVENOUS at 12:09

## 2021-05-26 ENCOUNTER — HOSPITAL ENCOUNTER (OUTPATIENT)
Dept: CARDIOLOGY | Facility: HOSPITAL | Age: 41
Discharge: HOME | End: 2021-05-26
Admitting: INTERNAL MEDICINE

## 2021-05-26 VITALS
SYSTOLIC BLOOD PRESSURE: 160 MMHG | WEIGHT: 134 LBS | HEIGHT: 65 IN | HEART RATE: 84 BPM | DIASTOLIC BLOOD PRESSURE: 90 MMHG | BODY MASS INDEX: 22.33 KG/M2 | OXYGEN SATURATION: 98 %

## 2021-05-26 DIAGNOSIS — Z87.74 S/P VSD REPAIR: ICD-10-CM

## 2021-05-26 DIAGNOSIS — Z87.74 STATUS POST REPAIR OF COARCTATION OF AORTA: ICD-10-CM

## 2021-05-26 DIAGNOSIS — R01.1 SYSTOLIC MURMUR: ICD-10-CM

## 2021-05-26 DIAGNOSIS — Q24.9 CONGENITAL HEART DISEASE: ICD-10-CM

## 2021-05-26 LAB
AORTIC ARCH: 2 CM
BH CV ECHO MEAS - ACS: 1.8 CM
BH CV ECHO MEAS - AO MAX PG (FULL): 3.4 MMHG
BH CV ECHO MEAS - AO MAX PG: 6.9 MMHG
BH CV ECHO MEAS - AO MEAN PG (FULL): 2 MMHG
BH CV ECHO MEAS - AO MEAN PG: 4 MMHG
BH CV ECHO MEAS - AO ROOT AREA (BSA CORRECTED): 1.7
BH CV ECHO MEAS - AO ROOT AREA: 6.2 CM^2
BH CV ECHO MEAS - AO ROOT DIAM: 2.8 CM
BH CV ECHO MEAS - AO V2 MAX: 131 CM/SEC
BH CV ECHO MEAS - AO V2 MEAN: 91.5 CM/SEC
BH CV ECHO MEAS - AO V2 VTI: 26.7 CM
BH CV ECHO MEAS - AVA(I,A): 1.7 CM^2
BH CV ECHO MEAS - AVA(I,D): 1.7 CM^2
BH CV ECHO MEAS - AVA(V,A): 1.8 CM^2
BH CV ECHO MEAS - AVA(V,D): 1.8 CM^2
BH CV ECHO MEAS - BSA(HAYCOCK): 1.6 M^2
BH CV ECHO MEAS - BSA: 1.6 M^2
BH CV ECHO MEAS - BZI_BMI: 21.6 KILOGRAMS/M^2
BH CV ECHO MEAS - BZI_METRIC_HEIGHT: 165.1 CM
BH CV ECHO MEAS - BZI_METRIC_WEIGHT: 59 KG
BH CV ECHO MEAS - EDV(MOD-SP2): 72 ML
BH CV ECHO MEAS - EDV(MOD-SP4): 58 ML
BH CV ECHO MEAS - EDV(TEICH): 87.7 ML
BH CV ECHO MEAS - EF(CUBED): 74.2 %
BH CV ECHO MEAS - EF(MOD-BP): 60.1 %
BH CV ECHO MEAS - EF(MOD-SP2): 65.3 %
BH CV ECHO MEAS - EF(MOD-SP4): 56.9 %
BH CV ECHO MEAS - EF(TEICH): 66.3 %
BH CV ECHO MEAS - ESV(MOD-SP2): 25 ML
BH CV ECHO MEAS - ESV(MOD-SP4): 25 ML
BH CV ECHO MEAS - ESV(TEICH): 29.6 ML
BH CV ECHO MEAS - FS: 36.4 %
BH CV ECHO MEAS - IVS/LVPW: 0.88
BH CV ECHO MEAS - IVSD: 0.7 CM
BH CV ECHO MEAS - LAT PEAK E' VEL: 12.6 CM/SEC
BH CV ECHO MEAS - LV DIASTOLIC VOL/BSA (35-75): 35.2 ML/M^2
BH CV ECHO MEAS - LV MASS(C)D: 100.6 GRAMS
BH CV ECHO MEAS - LV MASS(C)DI: 61.1 GRAMS/M^2
BH CV ECHO MEAS - LV MAX PG: 3.5 MMHG
BH CV ECHO MEAS - LV MEAN PG: 2 MMHG
BH CV ECHO MEAS - LV SYSTOLIC VOL/BSA (12-30): 15.2 ML/M^2
BH CV ECHO MEAS - LV V1 MAX: 93.2 CM/SEC
BH CV ECHO MEAS - LV V1 MEAN: 63.9 CM/SEC
BH CV ECHO MEAS - LV V1 VTI: 18.2 CM
BH CV ECHO MEAS - LVIDD: 4.4 CM
BH CV ECHO MEAS - LVIDS: 2.8 CM
BH CV ECHO MEAS - LVLD AP2: 7.9 CM
BH CV ECHO MEAS - LVLD AP4: 7.3 CM
BH CV ECHO MEAS - LVLS AP2: 6.9 CM
BH CV ECHO MEAS - LVLS AP4: 6 CM
BH CV ECHO MEAS - LVOT AREA (M): 2.5 CM^2
BH CV ECHO MEAS - LVOT AREA: 2.5 CM^2
BH CV ECHO MEAS - LVOT DIAM: 1.8 CM
BH CV ECHO MEAS - LVPWD: 0.8 CM
BH CV ECHO MEAS - MED PEAK E' VEL: 9.8 CM/SEC
BH CV ECHO MEAS - MV A DUR: 0.09 SEC
BH CV ECHO MEAS - MV A MAX VEL: 58.8 CM/SEC
BH CV ECHO MEAS - MV DEC SLOPE: 615 CM/SEC^2
BH CV ECHO MEAS - MV DEC TIME: 0.1 SEC
BH CV ECHO MEAS - MV E MAX VEL: 97.9 CM/SEC
BH CV ECHO MEAS - MV E/A: 1.7
BH CV ECHO MEAS - MV MAX PG: 5.3 MMHG
BH CV ECHO MEAS - MV MEAN PG: 2 MMHG
BH CV ECHO MEAS - MV P1/2T MAX VEL: 109 CM/SEC
BH CV ECHO MEAS - MV P1/2T: 51.9 MSEC
BH CV ECHO MEAS - MV V2 MAX: 115 CM/SEC
BH CV ECHO MEAS - MV V2 MEAN: 58.5 CM/SEC
BH CV ECHO MEAS - MV V2 VTI: 24.7 CM
BH CV ECHO MEAS - MVA P1/2T LCG: 2 CM^2
BH CV ECHO MEAS - MVA(P1/2T): 4.2 CM^2
BH CV ECHO MEAS - MVA(VTI): 1.9 CM^2
BH CV ECHO MEAS - PA ACC TIME: 0.1 SEC
BH CV ECHO MEAS - PA MAX PG (FULL): 0.97 MMHG
BH CV ECHO MEAS - PA MAX PG: 3.1 MMHG
BH CV ECHO MEAS - PA PR(ACCEL): 34.9 MMHG
BH CV ECHO MEAS - PA V2 MAX: 88.2 CM/SEC
BH CV ECHO MEAS - PULM A REVS DUR: 0.09 SEC
BH CV ECHO MEAS - PULM A REVS VEL: 17.6 CM/SEC
BH CV ECHO MEAS - PULM DIAS VEL: 86.1 CM/SEC
BH CV ECHO MEAS - PULM S/D: 0.76
BH CV ECHO MEAS - PULM SYS VEL: 65.6 CM/SEC
BH CV ECHO MEAS - PVA(V,A): 2.3 CM^2
BH CV ECHO MEAS - PVA(V,D): 2.3 CM^2
BH CV ECHO MEAS - QP/QS: 1
BH CV ECHO MEAS - RAP SYSTOLE: 3 MMHG
BH CV ECHO MEAS - RV MAX PG: 2.1 MMHG
BH CV ECHO MEAS - RV MEAN PG: 1 MMHG
BH CV ECHO MEAS - RV V1 MAX: 73.1 CM/SEC
BH CV ECHO MEAS - RV V1 MEAN: 50 CM/SEC
BH CV ECHO MEAS - RV V1 VTI: 16.3 CM
BH CV ECHO MEAS - RVOT AREA: 2.8 CM^2
BH CV ECHO MEAS - RVOT DIAM: 1.9 CM
BH CV ECHO MEAS - RVSP: 29 MMHG
BH CV ECHO MEAS - SI(AO): 99.8 ML/M^2
BH CV ECHO MEAS - SI(CUBED): 38.4 ML/M^2
BH CV ECHO MEAS - SI(LVOT): 28.1 ML/M^2
BH CV ECHO MEAS - SI(MOD-SP2): 28.5 ML/M^2
BH CV ECHO MEAS - SI(MOD-SP4): 20 ML/M^2
BH CV ECHO MEAS - SI(TEICH): 35.3 ML/M^2
BH CV ECHO MEAS - SUP REN AO DIAM: 1.9 CM
BH CV ECHO MEAS - SV(AO): 164.4 ML
BH CV ECHO MEAS - SV(CUBED): 63.2 ML
BH CV ECHO MEAS - SV(LVOT): 46.3 ML
BH CV ECHO MEAS - SV(MOD-SP2): 47 ML
BH CV ECHO MEAS - SV(MOD-SP4): 33 ML
BH CV ECHO MEAS - SV(RVOT): 46.2 ML
BH CV ECHO MEAS - SV(TEICH): 58.1 ML
BH CV ECHO MEAS - TAPSE (>1.6): 2.2 CM
BH CV ECHO MEAS - TR MAX VEL: 257 CM/SEC
BH CV ECHO MEASUREMENTS AVERAGE E/E' RATIO: 8.74
BH CV VAS BP RIGHT ARM: NORMAL MMHG
BH CV XLRA - RV BASE: 3 CM
BH CV XLRA - RV LENGTH: 6.8 CM
BH CV XLRA - RV MID: 3.3 CM
BH CV XLRA - TDI S': 12 CM/SEC
LEFT ATRIUM VOLUME INDEX: 20 ML/M2
MAXIMAL PREDICTED HEART RATE: 179 BPM
SINUS: 2.7 CM
STJ: 3.6 CM
STRESS TARGET HR: 152 BPM

## 2021-05-26 PROCEDURE — 93306 TTE W/DOPPLER COMPLETE: CPT

## 2021-05-26 PROCEDURE — 93306 TTE W/DOPPLER COMPLETE: CPT | Performed by: INTERNAL MEDICINE

## 2021-05-26 NOTE — PROGRESS NOTES
I called and gave her the results.  No evidence of residual VSD.  No major pathology noted.  I will see her back in the office in 1 year.    ANDRES

## 2021-06-11 ENCOUNTER — TELEPHONE (OUTPATIENT)
Dept: INTERNAL MEDICINE | Facility: CLINIC | Age: 41
End: 2021-06-11

## 2021-06-11 NOTE — TELEPHONE ENCOUNTER
Sharron Guardado called office today  She can be reached at [phone] she has a seizure disorder the transportation department of [facility] requires her to fill out forms and Sharron wanted to know can forms be filled out or does she need to have an appointment

## 2021-06-15 NOTE — TELEPHONE ENCOUNTER
Message left on v/m asking patient to return my call. So I can work her in for an appointment.    Thanks

## 2021-06-16 DIAGNOSIS — F51.01 PRIMARY INSOMNIA: ICD-10-CM

## 2021-06-16 NOTE — TELEPHONE ENCOUNTER
Patient returned my call. She has been scheduled to come in on 6/25/2021 for her CPE and to complete her [place] Transportation Forms.

## 2021-06-17 RX ORDER — ZOLPIDEM TARTRATE 10 MG/1
TABLET ORAL
Qty: 30 TABLET | Refills: 1 | Status: SHIPPED | OUTPATIENT
Start: 2021-06-17 | End: 2021-08-23

## 2021-06-24 ENCOUNTER — TELEPHONE (OUTPATIENT)
Dept: INTERNAL MEDICINE | Facility: CLINIC | Age: 41
End: 2021-06-24

## 2021-06-24 NOTE — TELEPHONE ENCOUNTER
Detailed message left on v/m informing patient this was a date/time she was worked in and Dr. Mercer will not have any more availability until a few months out.    I asked that she keep this appointment for her CPE and completion of paperwork.

## 2021-06-24 NOTE — TELEPHONE ENCOUNTER
Caller: Sharron Guradado    Relationship to patient: Self    Best call back number: [phone]    Type of visit: PHYSICAL    Requested date: 06/25/2021    If rescheduling, when is the original appointment: 06/25/2021    Additional notes:PATIENT WOULD LIKE TO KNOW IF SHE HAS ANYTHING AFTER NINE IN THE MORNING.  IF NOT, SHE WILL KEEP THE ORIGINAL APPOINTMENT     PLEASE CALL AND ADVISE.     ATTEMPTED TO WARM TRANSFER.

## 2021-06-25 ENCOUNTER — TELEPHONE (OUTPATIENT)
Dept: INTERNAL MEDICINE | Facility: CLINIC | Age: 41
End: 2021-06-25

## 2021-06-25 ENCOUNTER — OFFICE VISIT (OUTPATIENT)
Dept: INTERNAL MEDICINE | Facility: CLINIC | Age: 41
End: 2021-06-25

## 2021-06-25 VITALS
HEART RATE: 73 BPM | TEMPERATURE: 98.2 F | SYSTOLIC BLOOD PRESSURE: 160 MMHG | HEIGHT: 65 IN | WEIGHT: 142 LBS | RESPIRATION RATE: 18 BRPM | DIASTOLIC BLOOD PRESSURE: 98 MMHG | BODY MASS INDEX: 23.66 KG/M2 | OXYGEN SATURATION: 98 %

## 2021-06-25 DIAGNOSIS — Z00.00 ANNUAL PHYSICAL EXAM: Primary | ICD-10-CM

## 2021-06-25 DIAGNOSIS — I10 ESSENTIAL HYPERTENSION: ICD-10-CM

## 2021-06-25 DIAGNOSIS — E04.9 THYROID ENLARGED: ICD-10-CM

## 2021-06-25 DIAGNOSIS — M54.2 NECK PAIN: ICD-10-CM

## 2021-06-25 DIAGNOSIS — R00.2 PALPITATIONS: ICD-10-CM

## 2021-06-25 DIAGNOSIS — G40.109 TEMPORAL LOBE EPILEPSY (HCC): ICD-10-CM

## 2021-06-25 DIAGNOSIS — Z87.898 HISTORY OF SEIZURE: ICD-10-CM

## 2021-06-25 DIAGNOSIS — F51.01 PRIMARY INSOMNIA: ICD-10-CM

## 2021-06-25 DIAGNOSIS — Z11.59 NEED FOR HEPATITIS C SCREENING TEST: ICD-10-CM

## 2021-06-25 DIAGNOSIS — R53.82 CHRONIC FATIGUE: ICD-10-CM

## 2021-06-25 DIAGNOSIS — G89.4 CHRONIC PAIN SYNDROME: ICD-10-CM

## 2021-06-25 PROCEDURE — 99396 PREV VISIT EST AGE 40-64: CPT | Performed by: INTERNAL MEDICINE

## 2021-06-25 RX ORDER — VALSARTAN 40 MG/1
40 TABLET ORAL DAILY
Qty: 90 TABLET | Refills: 1 | Status: SHIPPED | OUTPATIENT
Start: 2021-06-25 | End: 2021-12-17

## 2021-06-25 NOTE — TELEPHONE ENCOUNTER
PATIENT FORGOT WHAT HER BLOOD PRESSURE WAS TODAY WHEN SHE WAS IN. HER  WANTED TO KNOW.    PLEASE ADVISE  [phone]

## 2021-06-25 NOTE — PATIENT INSTRUCTIONS

## 2021-06-25 NOTE — PROGRESS NOTES
"Chief Complaint  Annual Exam    Subjective          Sharron Guardado presents to [facility] for   History of Present Illness    Review of Systems   Constitutional: Positive for fatigue. Negative for appetite change, chills, fever and unexpected weight change.   HENT: Negative for congestion, ear pain, mouth sores, sore throat, tinnitus, trouble swallowing and voice change.    Eyes: Negative for pain and visual disturbance.   Respiratory: Negative for cough, choking, shortness of breath and wheezing.    Cardiovascular: Positive for palpitations (off & on - no change). Negative for chest pain and leg swelling.   Gastrointestinal: Negative for abdominal pain, blood in stool, constipation, diarrhea, nausea and vomiting.   Endocrine: Negative for cold intolerance, heat intolerance, polydipsia and polyuria.   Genitourinary: Negative for difficulty urinating, dysuria, enuresis, flank pain, frequency, hematuria and urgency.   Musculoskeletal: Positive for neck pain and neck stiffness. Negative for arthralgias, back pain, gait problem, joint swelling and myalgias.   Skin: Negative for color change and rash.   Allergic/Immunologic: Positive for environmental allergies. Negative for food allergies and immunocompromised state.   Neurological: Negative for dizziness, tremors, seizures, syncope, speech difficulty, weakness, numbness and headaches.   Hematological: Negative for adenopathy. Does not bruise/bleed easily.   Psychiatric/Behavioral: Positive for sleep disturbance. Negative for agitation, confusion, decreased concentration, dysphoric mood and suicidal ideas. The patient is nervous/anxious.         Objective   Vital Signs:   /98   Pulse 73   Temp 98.2 °F (36.8 °C)   Resp 18   Ht 165.1 cm (65\")   Wt 64.4 kg (142 lb)   SpO2 98%   BMI 23.63 kg/m²     Physical Exam  Vitals and nursing note reviewed.   Constitutional:       Appearance: She is well-developed.   HENT:      Right Ear: " Hearing, tympanic membrane, ear canal and external ear normal.      Left Ear: Hearing, tympanic membrane, ear canal and external ear normal.      Nose:      Right Sinus: No maxillary sinus tenderness or frontal sinus tenderness.      Left Sinus: No maxillary sinus tenderness or frontal sinus tenderness.   Eyes:      General: Lids are normal.      Conjunctiva/sclera: Conjunctivae normal.      Pupils: Pupils are equal, round, and reactive to light.   Neck:      Thyroid: No thyroid mass or thyromegaly.      Vascular: No carotid bruit or JVD.      Trachea: Trachea normal. No tracheal deviation.   Cardiovascular:      Rate and Rhythm: Normal rate and regular rhythm.      Pulses:           Carotid pulses are 2+ on the right side and 2+ on the left side.       Radial pulses are 2+ on the right side and 2+ on the left side.        Dorsalis pedis pulses are 2+ on the right side and 2+ on the left side.        Posterior tibial pulses are 2+ on the right side and 2+ on the left side.      Heart sounds: S1 normal and S2 normal. No murmur heard.   No friction rub. No gallop.    Pulmonary:      Effort: Pulmonary effort is normal.      Breath sounds: Normal breath sounds.   Abdominal:      General: Bowel sounds are normal. There is no abdominal bruit.      Palpations: Abdomen is soft.      Tenderness: There is no abdominal tenderness. There is no guarding or rebound.      Hernia: No hernia is present.   Musculoskeletal:         General: Normal range of motion.      Cervical back: Neck supple.   Lymphadenopathy:      Cervical: No cervical adenopathy.      Upper Body:      Right upper body: No supraclavicular adenopathy.      Left upper body: No supraclavicular adenopathy.   Skin:     General: Skin is warm and dry.   Neurological:      General: No focal deficit present.      Mental Status: She is alert.      Cranial Nerves: No cranial nerve deficit.      Sensory: No sensory deficit.      Deep Tendon Reflexes:      Reflex Scores:        Patellar reflexes are 2+ on the right side and 2+ on the left side.  Psychiatric:         Mood and Affect: Mood normal.         Behavior: Behavior normal.         Thought Content: Thought content normal.         Judgment: Judgment normal.        Result Review :                 Assessment and Plan    Problem List Items Addressed This Visit        Unprioritized    Insomnia    Current Assessment & Plan     Avoid nightly ambien - ok to use gabapentin qhs with occ ambien only if needed         Chronic pain    Overview     Ne[facility] pain - [facility]         Current Assessment & Plan     Need nightly gabapentin          History of seizure    Overview     Overview:   Break through SZ on generic Keppra   Neuro          Current Assessment & Plan     None since 3/13/2021 - continue f/u with neuro         Palpitations    Overview     Seeing cardiologist dr penn yearly         Current Assessment & Plan     No change for yrs - call if worse         Neck pain    Current Assessment & Plan     Trial of nightly gabapentin - may increase to 800 mg qhs if needed         Temporal lobe epilepsy (CMS/HCC)    Thyroid enlarged    Fatigue    Relevant Orders    TSH Rfx On Abnormal To Free T4    Essential hypertension    Current Assessment & Plan     Hypertension is newly identified.  Dietary sodium restriction.  Blood pressure will be reassessed in 3 months.    Start low dose diovan - call if problems. Call with bp readings in next 2 wks.         Relevant Medications    valsartan (Diovan) 40 MG tablet      Other Visit Diagnoses     Annual physical exam    -  Primary    Relevant Orders    CBC & Differential    Lipid Panel    Comprehensive Metabolic Panel    Need for hepatitis C screening test        Relevant Orders    Hepatitis C Antibody       No seizure since 3/13/21 - will fill out driving permit.    Follow Up   Return in about 3 months (around 9/25/2021) for Recheck.  Patient was given  instructions and counseling regarding her condition or for health maintenance advice. Please see specific information pulled into the AVS if appropriate.      I highly recommend covid vaccine.

## 2021-06-25 NOTE — ASSESSMENT & PLAN NOTE
Hypertension is newly identified.  Dietary sodium restriction.  Blood pressure will be reassessed in 3 months.    Start low dose diovan - call if problems. Call with bp readings in next 2 wks.

## 2021-07-07 ENCOUNTER — TELEPHONE (OUTPATIENT)
Dept: INTERNAL MEDICINE | Facility: CLINIC | Age: 41
End: 2021-07-07

## 2021-07-07 DIAGNOSIS — F51.01 PRIMARY INSOMNIA: ICD-10-CM

## 2021-07-07 DIAGNOSIS — M54.2 NECK PAIN: ICD-10-CM

## 2021-07-07 RX ORDER — GABAPENTIN 800 MG/1
800 TABLET ORAL NIGHTLY
Qty: 90 TABLET | Refills: 1 | Status: SHIPPED | OUTPATIENT
Start: 2021-07-07 | End: 2022-01-14

## 2021-07-07 NOTE — TELEPHONE ENCOUNTER
Caller: Sharron Guardado    Relationship: Self    Best call back number:[phone]    What medication are you requesting: GABAPENTIN (NEURONTIN) 800 MG TABLET    If a prescription is needed, what is your preferred pharmacy and phone number: [facility] #[id number] [place] [id number] [facility] AT [facility] - [phone]  - [phone] FX     Additional notes:  PATIENT STATES AT HER LAST APT DR. KHALIL MENTIONED SHE WOULD CALL HER IN THIS MED AND UP THE DOSAGE  MG.

## 2021-08-22 DIAGNOSIS — F51.01 PRIMARY INSOMNIA: ICD-10-CM

## 2021-08-23 RX ORDER — ZOLPIDEM TARTRATE 10 MG/1
TABLET ORAL
Qty: 30 TABLET | Refills: 0 | Status: SHIPPED | OUTPATIENT
Start: 2021-08-23 | End: 2021-09-29 | Stop reason: SDUPTHER

## 2021-09-20 ENCOUNTER — TELEPHONE (OUTPATIENT)
Dept: INTERNAL MEDICINE | Facility: CLINIC | Age: 41
End: 2021-09-20

## 2021-09-20 ENCOUNTER — OFFICE VISIT (OUTPATIENT)
Dept: INTERNAL MEDICINE | Facility: CLINIC | Age: 41
End: 2021-09-20

## 2021-09-20 VITALS
HEIGHT: 65 IN | TEMPERATURE: 98.4 F | SYSTOLIC BLOOD PRESSURE: 147 MMHG | RESPIRATION RATE: 17 BRPM | HEART RATE: 71 BPM | BODY MASS INDEX: 23.49 KG/M2 | OXYGEN SATURATION: 98 % | DIASTOLIC BLOOD PRESSURE: 92 MMHG | WEIGHT: 141 LBS

## 2021-09-20 DIAGNOSIS — F51.01 PRIMARY INSOMNIA: ICD-10-CM

## 2021-09-20 DIAGNOSIS — F41.9 ANXIETY: ICD-10-CM

## 2021-09-20 DIAGNOSIS — I10 ESSENTIAL HYPERTENSION: Primary | ICD-10-CM

## 2021-09-20 PROCEDURE — 99213 OFFICE O/P EST LOW 20 MIN: CPT | Performed by: INTERNAL MEDICINE

## 2021-09-20 NOTE — PROGRESS NOTES
"Chief Complaint  Hypertension (3 month f/u.), Anxiety, and Insomnia    Subjective          Sharron Guardado presents to [facility] for   Hypertension  This is a chronic problem. The current episode started more than 1 year ago. The problem is unchanged. The problem is controlled. Associated symptoms include anxiety.   Anxiety  Presents for follow-up visit. Symptoms include excessive worry, insomnia and nervous/anxious behavior. Patient reports no depressed mood.       Insomnia  This is a chronic problem. The current episode started more than 1 year ago. The problem occurs constantly. The problem has been unchanged.       Review of Systems   Psychiatric/Behavioral: The patient is nervous/anxious and has insomnia.         Objective   Vital Signs:   /92 (BP Location: Right arm, Patient Position: Sitting, Cuff Size: Adult)   Pulse 71   Temp 98.4 °F (36.9 °C)   Resp 17   Ht 165.1 cm (65\")   Wt 64 kg (141 lb)   SpO2 98%   BMI 23.46 kg/m²     Physical Exam  Vitals and nursing note reviewed.   Constitutional:       General: She is not in acute distress.     Appearance: She is well-developed.   Cardiovascular:      Rate and Rhythm: Normal rate and regular rhythm.      Heart sounds: Normal heart sounds.   Pulmonary:      Effort: No respiratory distress.      Breath sounds: No wheezing or rales.   Chest:      Chest wall: No tenderness.   Psychiatric:         Behavior: Behavior normal.        Result Review :                 Assessment and Plan    Problem List Items Addressed This Visit        Unprioritized    Insomnia    Current Assessment & Plan     Improved - not using ambien nightly since taking gabapentin.         Anxiety    Current Assessment & Plan     Improved -call if worse.         Essential hypertension - Primary    Current Assessment & Plan     Hypertension is improving with treatment.  Continue current treatment regimen.  Dietary sodium restriction.  Regular aerobic " exercise.  Blood pressure will be reassessed at the next regular appointment.               Follow Up   Return in about 4 months (around 1/20/2022) for Recheck.  Patient was given instructions and counseling regarding her condition or for health maintenance advice. Please see specific information pulled into the AVS if appropriate.

## 2021-09-29 ENCOUNTER — TELEPHONE (OUTPATIENT)
Dept: INTERNAL MEDICINE | Facility: CLINIC | Age: 41
End: 2021-09-29

## 2021-09-29 DIAGNOSIS — F51.01 PRIMARY INSOMNIA: ICD-10-CM

## 2021-09-29 RX ORDER — ZOLPIDEM TARTRATE 10 MG/1
10 TABLET ORAL NIGHTLY PRN
Qty: 30 TABLET | Refills: 0 | Status: SHIPPED | OUTPATIENT
Start: 2021-09-29 | End: 2021-10-27

## 2021-09-29 NOTE — TELEPHONE ENCOUNTER
Caller: Sharron Guardado    Relationship: Self      Medication requested (name and dosage): zolpidem (AMBIEN) 10 MG tablet    Pharmacy where request should be sent: [facility] [address] [place] [address] [place] AT [facility] [address] - [phone]  - [phone] FX  [phone]    Additional details provided by patient: PATIENT HAS 1 AND A HALF LEFT   Best call back number: [phone]    Does the patient have less than a 3 day supply:  [x] Yes  [] No    Jeremy Jordan Rep   09/29/21 09:06 EDT

## 2021-10-11 ENCOUNTER — TELEPHONE (OUTPATIENT)
Dept: INTERNAL MEDICINE | Facility: CLINIC | Age: 41
End: 2021-10-11

## 2021-10-11 NOTE — TELEPHONE ENCOUNTER
PATIENT FAXED PAPERWORK IN TO DR KHALIL TO BE SENT IN TO TRANSPORTATION CABINET.     PLEASE ADVISE.   [phone]

## 2021-10-14 NOTE — TELEPHONE ENCOUNTER
I have received the faxed paperwork from ms. Guardado to review and complete. Once this has been completed the forms will be faxed to the fax number provided on the forms.

## 2021-10-26 NOTE — TELEPHONE ENCOUNTER
Patient informed that her forms were completed and have been faxed back. Pt verbalized she understood and thank you.

## 2021-10-27 DIAGNOSIS — F51.01 PRIMARY INSOMNIA: ICD-10-CM

## 2021-10-27 RX ORDER — ZOLPIDEM TARTRATE 10 MG/1
TABLET ORAL
Qty: 30 TABLET | Refills: 2 | Status: SHIPPED | OUTPATIENT
Start: 2021-10-27 | End: 2022-01-28 | Stop reason: SDUPTHER

## 2021-11-29 ENCOUNTER — TELEPHONE (OUTPATIENT)
Dept: NEUROLOGY | Facility: CLINIC | Age: 41
End: 2021-11-29

## 2021-11-29 RX ORDER — LEVETIRACETAM 500 MG/1
TABLET ORAL
Qty: 225 TABLET | Refills: 3 | Status: SHIPPED | OUTPATIENT
Start: 2021-11-29

## 2021-11-29 NOTE — TELEPHONE ENCOUNTER
LM FOR PATIENT LETTING HER KNOW THAT HER KEPPRA HAS BEEN REFILLED, HOWEVER, SHE IS DUE FOR A FOLLOW UP APPT IN FEB AND WILL NEED TO CALL AND MAKE AN APPT

## 2021-12-17 DIAGNOSIS — I10 ESSENTIAL HYPERTENSION: ICD-10-CM

## 2021-12-17 RX ORDER — VALSARTAN 40 MG/1
40 TABLET ORAL DAILY
Qty: 90 TABLET | Refills: 1 | Status: SHIPPED | OUTPATIENT
Start: 2021-12-17 | End: 2022-01-28

## 2021-12-27 RX ORDER — FOLIC ACID 1 MG/1
1 TABLET ORAL DAILY
Qty: 90 TABLET | Refills: 3 | Status: SHIPPED | OUTPATIENT
Start: 2021-12-27 | End: 2022-11-09

## 2022-01-03 ENCOUNTER — TELEPHONE (OUTPATIENT)
Dept: INTERNAL MEDICINE | Facility: CLINIC | Age: 42
End: 2022-01-03

## 2022-01-03 NOTE — TELEPHONE ENCOUNTER
Caller: Sharron Guardado    Relationship to patient: Self    Best call back number: [phone]       Date of positive COVID19 test: 1/3/2022      COVID19 symptoms:     FEVER, SORE THROAT      Date of initial quarantine: 1/3/2022    Additional information or concerns:     PATIENT IS WANTING TO GET A PCR COVID TEST CONDUCTED.  TRIED TO WARM TRANSFER CALL TO OFFICE BUT NO ANSWER.    WHAT SHOULD SHE DO TO GET THIS DONE.    PLEASE ADVISE      What is the patients preferred pharmacy:     [facility] #[id number] [address] AT [facility] - [phone] [facility] [phone]

## 2022-01-03 NOTE — TELEPHONE ENCOUNTER
I spoke with the pt and she did a rapid test today @ [facility] which came back positive and on the paperwork it recommended her to get a PCR test. I advised that unless she needed a PCR for work or any other reason there really was no need.  She understood and we went over everything to do and watch for and go there set up for a mychart to reach back out of need be also.

## 2022-01-13 DIAGNOSIS — F51.01 PRIMARY INSOMNIA: ICD-10-CM

## 2022-01-13 DIAGNOSIS — M54.2 NECK PAIN: ICD-10-CM

## 2022-01-14 RX ORDER — GABAPENTIN 800 MG/1
TABLET ORAL
Qty: 90 TABLET | Refills: 0 | Status: SHIPPED | OUTPATIENT
Start: 2022-01-14 | End: 2022-01-28

## 2022-01-27 PROBLEM — I10 ESSENTIAL HYPERTENSION: Chronic | Status: ACTIVE | Noted: 2021-06-25

## 2022-01-27 NOTE — PROGRESS NOTES
"Chief Complaint  Establish Care and Insomnia    Subjective          Sharron Guardado presents to [facility]  History of Present Illness     Establish care, prior PCP Mercer    Hypertension - stable.  Patient taking medication as prescribed.  Denies chest pain, shortness of breath, headache, lower extremity edema.  Patient does not take valsartan consistent basis but thought some of it from white coat syndrome.    Takes gabapentin 800 mg 1/2 tablet nightly PRN for sleep.  Takes about 4 x weekly.  Also takes 1/2 tablet Ambien 10mg nightly as needed.    She has a history of temporal lobe epilepsy which is treated with Keppra and monitored by neurology.    Objective   Vital Signs:   /71 (BP Location: Left arm, Patient Position: Sitting, Cuff Size: Adult)   Pulse 68   Temp 98.6 °F (37 °C) (Temporal)   Resp 16   Ht 165.1 cm (65\")   Wt 61.9 kg (136 lb 8 oz)   SpO2 100%   BMI 22.71 kg/m²     Physical Exam  Vitals and nursing note reviewed.   Constitutional:       General: She is not in acute distress.     Appearance: Normal appearance.   Cardiovascular:      Rate and Rhythm: Normal rate and regular rhythm.      Heart sounds: Normal heart sounds. No murmur heard.      Pulmonary:      Effort: Pulmonary effort is normal.      Breath sounds: Normal breath sounds.   Neurological:      Mental Status: She is alert.        Result Review :   The following data was reviewed by: Usama Babin MD on 01/28/2022:                Assessment and Plan    Diagnoses and all orders for this visit:    1. Establishing care with new doctor, encounter for (Primary)    2. Essential hypertension    3. Primary insomnia  Comments:  takes ambien nightly -typically 1/4 to 1/2 tablet   Orders:  -     zolpidem (AMBIEN) 5 MG tablet; Take 1 tablet by mouth At Night As Needed for Sleep.  Dispense: 30 tablet; Refill: 5  -     gabapentin (NEURONTIN) 400 MG capsule; Take 1 capsule by mouth Every Night.  Dispense: 30 " capsule; Refill: 5    4. Encounter for health maintenance examination in adult  -     CBC (No Diff); Future  -     Comprehensive Metabolic Panel; Future  -     Hemoglobin A1c; Future  -     Lipid Panel With LDL / HDL Ratio; Future  -     Urinalysis With Microscopic - Urine, Clean Catch; Future  -     TSH Rfx On Abnormal To Free T4; Future    5. Screening for deficiency anemia  -     CBC (No Diff); Future    6. Screening for diabetes mellitus  -     Comprehensive Metabolic Panel; Future  -     Hemoglobin A1c; Future    7. Screening for lipid disorders  -     Lipid Panel With LDL / HDL Ratio; Future    8. Screening for blood or protein in urine  -     Urinalysis With Microscopic - Urine, Clean Catch; Future    9. Screening for thyroid disorder  -     TSH Rfx On Abnormal To Free T4; Future      Stable conditions.  Trial of d/c valsartan.  Continue sleep medications.      Ordered patient's labs for her physical in the summer.        Follow Up   Return in about 5 months (around 6/27/2022) for Annual physical.  Patient was given instructions and counseling regarding her condition or for health maintenance advice. Please see specific information pulled into the AVS if appropriate.

## 2022-01-28 ENCOUNTER — OFFICE VISIT (OUTPATIENT)
Dept: INTERNAL MEDICINE | Facility: CLINIC | Age: 42
End: 2022-01-28

## 2022-01-28 VITALS
WEIGHT: 136.5 LBS | RESPIRATION RATE: 16 BRPM | BODY MASS INDEX: 22.74 KG/M2 | DIASTOLIC BLOOD PRESSURE: 71 MMHG | SYSTOLIC BLOOD PRESSURE: 132 MMHG | TEMPERATURE: 98.6 F | HEART RATE: 68 BPM | HEIGHT: 65 IN | OXYGEN SATURATION: 100 %

## 2022-01-28 DIAGNOSIS — Z76.89 ESTABLISHING CARE WITH NEW DOCTOR, ENCOUNTER FOR: Primary | ICD-10-CM

## 2022-01-28 DIAGNOSIS — Z13.220 SCREENING FOR LIPID DISORDERS: ICD-10-CM

## 2022-01-28 DIAGNOSIS — Z13.29 SCREENING FOR THYROID DISORDER: ICD-10-CM

## 2022-01-28 DIAGNOSIS — Z13.1 SCREENING FOR DIABETES MELLITUS: ICD-10-CM

## 2022-01-28 DIAGNOSIS — F51.01 PRIMARY INSOMNIA: ICD-10-CM

## 2022-01-28 DIAGNOSIS — Z00.00 ENCOUNTER FOR HEALTH MAINTENANCE EXAMINATION IN ADULT: ICD-10-CM

## 2022-01-28 DIAGNOSIS — Z13.89 SCREENING FOR BLOOD OR PROTEIN IN URINE: ICD-10-CM

## 2022-01-28 DIAGNOSIS — Z13.0 SCREENING FOR DEFICIENCY ANEMIA: ICD-10-CM

## 2022-01-28 DIAGNOSIS — I10 ESSENTIAL HYPERTENSION: ICD-10-CM

## 2022-01-28 PROCEDURE — 99214 OFFICE O/P EST MOD 30 MIN: CPT | Performed by: FAMILY MEDICINE

## 2022-01-28 RX ORDER — ZOLPIDEM TARTRATE 5 MG/1
5 TABLET ORAL NIGHTLY PRN
Qty: 30 TABLET | Refills: 5
Start: 2022-01-28 | End: 2022-02-08 | Stop reason: SDUPTHER

## 2022-01-28 RX ORDER — GABAPENTIN 400 MG/1
400 CAPSULE ORAL NIGHTLY
Qty: 30 CAPSULE | Refills: 5 | Status: ON HOLD
Start: 2022-01-28 | End: 2022-05-17

## 2022-01-28 NOTE — PATIENT INSTRUCTIONS

## 2022-02-07 NOTE — TELEPHONE ENCOUNTER
Caller: Sharron Guardado    Relationship to patient: Self    Best call back number: [phone]    Patient is needing: PATIENT STATES PHARMACY ADVISED HER THAT THIS RX JUST NEEDS TO BE REWRITTEN BY NEW PCP

## 2022-02-08 DIAGNOSIS — F51.01 PRIMARY INSOMNIA: ICD-10-CM

## 2022-02-08 RX ORDER — ZOLPIDEM TARTRATE 10 MG/1
TABLET ORAL
Qty: 30 TABLET | OUTPATIENT
Start: 2022-02-08

## 2022-02-08 RX ORDER — ZOLPIDEM TARTRATE 5 MG/1
5 TABLET ORAL NIGHTLY PRN
Qty: 30 TABLET | Refills: 5 | Status: SHIPPED | OUTPATIENT
Start: 2022-02-08 | End: 2022-03-02 | Stop reason: SDUPTHER

## 2022-03-01 ENCOUNTER — TELEPHONE (OUTPATIENT)
Dept: INTERNAL MEDICINE | Facility: CLINIC | Age: 42
End: 2022-03-01

## 2022-03-01 DIAGNOSIS — F51.01 PRIMARY INSOMNIA: ICD-10-CM

## 2022-03-01 NOTE — TELEPHONE ENCOUNTER
Maybe I misunderstood but from what I read from Dr. Mercer and what I discussed with her she was getting 10mg and cutting them in half or a quarter.  All I did was change to 5mg tablets so she would not need to cut them in half.  This also goes along with the PDMP report from when Dr. Mercer was prescribing.  Please check with her as this information from the telephone call does not match with the records.

## 2022-03-01 NOTE — TELEPHONE ENCOUNTER
Caller: Sharron Guardado    Relationship: Self    Best call back number: [phone]    What medication are you requesting: zolpidem (AMBIEN) 10 MG tablet    What are your current symptoms: PATIENT USED TO TAKE THE 10 MG AND DIDN'T HAVE ANY ISSUES SLEEPING, BUT DR. DE ANDA REDUCED THE DOSAGE TO 5 MG AND NOW SHE IS ONLY BEING ABOUT TO MAYBE GET 1 TO 2 HOURS OF SLEEP AT NIGHT.  SHE EVEN HAD A SEIZURE THE OTHER NIGHT THAT WAS BROUGHT ON DUE TO LACK OF SLEEP.    How long have you been experiencing symptoms: SINCE THE CHANGE IN DOSAGE.    Have you had these symptoms before:    [] Yes  [x] No    Have you been treated for these symptoms before:   [] Yes  [x] No    If a prescription is needed, what is your preferred pharmacy and phone number: [facility] #[id number] [place], [place] - [address] AT [facility] - [phone] [facility] [phone] FX     Additional notes:  PLEASE CONTACT PATIENT IF THERE ARE ANY FURTHER QUESTIONS.

## 2022-03-02 RX ORDER — ZOLPIDEM TARTRATE 10 MG/1
10 TABLET ORAL NIGHTLY PRN
Qty: 30 TABLET | Refills: 5 | Status: SHIPPED | OUTPATIENT
Start: 2022-03-02 | End: 2022-05-18 | Stop reason: HOSPADM

## 2022-03-02 NOTE — TELEPHONE ENCOUNTER
I have put it back at 10mg.  Be advised that we are seeing a higher risk of build up in the system of this drug, especially in women, when the medication is over 5mg.  She should exercise caution when driving, operating machinery, or doing safety sensitive duties.

## 2022-03-02 NOTE — TELEPHONE ENCOUNTER
Pt called back and she rarely takes a full tab depending on the day.  She's tried the 5 and can't sleep for more than a few hours. She's been taking 2 5mg's and is now out.  Please send in 10mg's for 1 at bedtime if you are ok with that and advise if you are not.

## 2022-03-21 DIAGNOSIS — F51.01 PRIMARY INSOMNIA: ICD-10-CM

## 2022-03-21 RX ORDER — ZOLPIDEM TARTRATE 10 MG/1
TABLET ORAL
Qty: 30 TABLET | OUTPATIENT
Start: 2022-03-21

## 2022-04-01 ENCOUNTER — APPOINTMENT (OUTPATIENT)
Dept: WOMENS IMAGING | Facility: HOSPITAL | Age: 42
End: 2022-04-01

## 2022-04-01 PROCEDURE — 77063 BREAST TOMOSYNTHESIS BI: CPT | Performed by: RADIOLOGY

## 2022-04-01 PROCEDURE — 77067 SCR MAMMO BI INCL CAD: CPT | Performed by: RADIOLOGY

## 2022-04-14 DIAGNOSIS — F51.01 PRIMARY INSOMNIA: ICD-10-CM

## 2022-04-14 DIAGNOSIS — M54.2 NECK PAIN: ICD-10-CM

## 2022-04-15 RX ORDER — GABAPENTIN 800 MG/1
TABLET ORAL
Qty: 90 TABLET | OUTPATIENT
Start: 2022-04-15

## 2022-04-15 NOTE — TELEPHONE ENCOUNTER
Note state that medication was discontinued therapy complete, please advise  Last OV 1-28-22  Next OV 8-11-22

## 2022-04-15 NOTE — TELEPHONE ENCOUNTER
I have prescribed a different dose of it and the 400 mg is the correct dose on file.  This may have been an automated refill request.

## 2022-04-26 ENCOUNTER — TELEPHONE (OUTPATIENT)
Dept: INTERNAL MEDICINE | Facility: CLINIC | Age: 42
End: 2022-04-26

## 2022-04-26 ENCOUNTER — TELEPHONE (OUTPATIENT)
Dept: CARDIOLOGY | Facility: CLINIC | Age: 42
End: 2022-04-26

## 2022-04-26 NOTE — TELEPHONE ENCOUNTER
Patient called and stated that she is not going to be able to make it to her appointment on 5/11/22.  She stated that she does not have her schedule at this time, so she is not able to reschedule.  She stated that she would call back to get rescheduled.  Patient appointment has been cancelled.    Jag
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
## 2022-04-27 DIAGNOSIS — F51.01 PRIMARY INSOMNIA: ICD-10-CM

## 2022-04-27 DIAGNOSIS — M54.2 NECK PAIN: ICD-10-CM

## 2022-04-27 NOTE — TELEPHONE ENCOUNTER
Patient notified gabapentin 800mg was denied as she is to take 400mg. She was unaware 800mg was automatically requested. She is contacting pharmacy to request removal from profile and notify 400mg needs to be refilled as she should still have refills remaining.
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
## 2022-04-28 RX ORDER — GABAPENTIN 600 MG/1
TABLET ORAL
Qty: 90 TABLET | Refills: 1 | Status: SHIPPED | OUTPATIENT
Start: 2022-04-28 | End: 2022-05-18 | Stop reason: HOSPADM

## 2022-05-16 ENCOUNTER — HOSPITAL ENCOUNTER (INPATIENT)
Facility: HOSPITAL | Age: 42
LOS: 1 days | Discharge: HOME | End: 2022-05-18
Attending: EMERGENCY MEDICINE | Admitting: INTERNAL MEDICINE

## 2022-05-16 DIAGNOSIS — R93.7 ABNORMAL CT SCAN, CERVICAL SPINE: ICD-10-CM

## 2022-05-16 DIAGNOSIS — F10.929 ALCOHOLIC INTOXICATION WITH COMPLICATION: ICD-10-CM

## 2022-05-16 DIAGNOSIS — Z09 FOLLOW-UP EXAM: ICD-10-CM

## 2022-05-16 DIAGNOSIS — T50.902A INTENTIONAL OVERDOSE, INITIAL ENCOUNTER: Primary | ICD-10-CM

## 2022-05-16 LAB — GLUCOSE BLDC GLUCOMTR-MCNC: 63 MG/DL (ref 70–130)

## 2022-05-16 PROCEDURE — 84703 CHORIONIC GONADOTROPIN ASSAY: CPT | Performed by: EMERGENCY MEDICINE

## 2022-05-16 PROCEDURE — 80307 DRUG TEST PRSMV CHEM ANLYZR: CPT | Performed by: EMERGENCY MEDICINE

## 2022-05-16 PROCEDURE — 83735 ASSAY OF MAGNESIUM: CPT | Performed by: EMERGENCY MEDICINE

## 2022-05-16 PROCEDURE — 80143 DRUG ASSAY ACETAMINOPHEN: CPT | Performed by: EMERGENCY MEDICINE

## 2022-05-16 PROCEDURE — 85025 COMPLETE CBC W/AUTO DIFF WBC: CPT | Performed by: EMERGENCY MEDICINE

## 2022-05-16 PROCEDURE — 82077 ASSAY SPEC XCP UR&BREATH IA: CPT | Performed by: EMERGENCY MEDICINE

## 2022-05-16 PROCEDURE — 99285 EMERGENCY DEPT VISIT HI MDM: CPT

## 2022-05-16 PROCEDURE — P9612 CATHETERIZE FOR URINE SPEC: HCPCS

## 2022-05-16 PROCEDURE — 93010 ELECTROCARDIOGRAM REPORT: CPT | Performed by: INTERNAL MEDICINE

## 2022-05-16 PROCEDURE — 80053 COMPREHEN METABOLIC PANEL: CPT | Performed by: EMERGENCY MEDICINE

## 2022-05-16 PROCEDURE — 36415 COLL VENOUS BLD VENIPUNCTURE: CPT

## 2022-05-16 PROCEDURE — 82962 GLUCOSE BLOOD TEST: CPT

## 2022-05-16 PROCEDURE — 80179 DRUG ASSAY SALICYLATE: CPT | Performed by: EMERGENCY MEDICINE

## 2022-05-16 PROCEDURE — 93005 ELECTROCARDIOGRAM TRACING: CPT | Performed by: EMERGENCY MEDICINE

## 2022-05-16 RX ORDER — SODIUM CHLORIDE 0.9 % (FLUSH) 0.9 %
10 SYRINGE (ML) INJECTION AS NEEDED
Status: DISCONTINUED | OUTPATIENT
Start: 2022-05-16 | End: 2022-05-18 | Stop reason: HOSPADM

## 2022-05-16 RX ADMIN — SODIUM CHLORIDE 1000 ML: 9 INJECTION, SOLUTION INTRAVENOUS at 23:57

## 2022-05-17 ENCOUNTER — APPOINTMENT (OUTPATIENT)
Dept: OTHER | Facility: HOSPITAL | Age: 42
End: 2022-05-17

## 2022-05-17 ENCOUNTER — APPOINTMENT (OUTPATIENT)
Dept: CT IMAGING | Facility: HOSPITAL | Age: 42
End: 2022-05-17

## 2022-05-17 PROBLEM — S12.100A: Status: ACTIVE | Noted: 2022-05-17

## 2022-05-17 PROBLEM — T50.902A INTENTIONAL OVERDOSE, INITIAL ENCOUNTER: Status: ACTIVE | Noted: 2022-05-17

## 2022-05-17 PROBLEM — T14.91XA SUICIDE ATTEMPT: Status: ACTIVE | Noted: 2022-05-17

## 2022-05-17 PROBLEM — F10.10 ALCOHOL ABUSE: Status: ACTIVE | Noted: 2022-05-17

## 2022-05-17 LAB
ALBUMIN SERPL-MCNC: 4.4 G/DL (ref 3.5–5.2)
ALBUMIN/GLOB SERPL: 1.7 G/DL
ALP SERPL-CCNC: 75 U/L (ref 39–117)
ALT SERPL W P-5'-P-CCNC: 19 U/L (ref 1–33)
AMPHET+METHAMPHET UR QL: NEGATIVE
ANION GAP SERPL CALCULATED.3IONS-SCNC: 12 MMOL/L (ref 5–15)
APAP SERPL-MCNC: <5 MCG/ML (ref 0–30)
ARTERIAL PATENCY WRIST A: POSITIVE
AST SERPL-CCNC: 37 U/L (ref 1–32)
ATMOSPHERIC PRESS: 747.3 MMHG
B-HCG UR QL: NEGATIVE
BARBITURATES UR QL SCN: NEGATIVE
BASE EXCESS BLDA CALC-SCNC: -6.2 MMOL/L (ref 0–2)
BASOPHILS # BLD AUTO: 0.01 10*3/MM3 (ref 0–0.2)
BASOPHILS NFR BLD AUTO: 0.1 % (ref 0–1.5)
BDY SITE: ABNORMAL
BENZODIAZ UR QL SCN: NEGATIVE
BILIRUB SERPL-MCNC: 0.4 MG/DL (ref 0–1.2)
BUN SERPL-MCNC: 14 MG/DL (ref 6–20)
BUN/CREAT SERPL: 20 (ref 7–25)
CALCIUM SPEC-SCNC: 9.3 MG/DL (ref 8.6–10.5)
CANNABINOIDS SERPL QL: NEGATIVE
CHLORIDE SERPL-SCNC: 104 MMOL/L (ref 98–107)
CO2 SERPL-SCNC: 25 MMOL/L (ref 22–29)
COCAINE UR QL: NEGATIVE
CREAT SERPL-MCNC: 0.7 MG/DL (ref 0.57–1)
DEPRECATED RDW RBC AUTO: 44.9 FL (ref 37–54)
EGFRCR SERPLBLD CKD-EPI 2021: 110.9 ML/MIN/1.73
EOSINOPHIL # BLD AUTO: 0.25 10*3/MM3 (ref 0–0.4)
EOSINOPHIL NFR BLD AUTO: 3 % (ref 0.3–6.2)
ERYTHROCYTE [DISTWIDTH] IN BLOOD BY AUTOMATED COUNT: 12.1 % (ref 12.3–15.4)
ETHANOL BLD-MCNC: 168 MG/DL (ref 0–10)
ETHANOL UR QL: 0.17 %
GLOBULIN UR ELPH-MCNC: 2.6 GM/DL
GLUCOSE BLDC GLUCOMTR-MCNC: 102 MG/DL (ref 70–130)
GLUCOSE BLDC GLUCOMTR-MCNC: 112 MG/DL (ref 70–130)
GLUCOSE BLDC GLUCOMTR-MCNC: 112 MG/DL (ref 70–130)
GLUCOSE BLDC GLUCOMTR-MCNC: 115 MG/DL (ref 70–130)
GLUCOSE BLDC GLUCOMTR-MCNC: 117 MG/DL (ref 70–130)
GLUCOSE BLDC GLUCOMTR-MCNC: 57 MG/DL (ref 70–130)
GLUCOSE BLDC GLUCOMTR-MCNC: 69 MG/DL (ref 70–130)
GLUCOSE BLDC GLUCOMTR-MCNC: 78 MG/DL (ref 70–130)
GLUCOSE SERPL-MCNC: 81 MG/DL (ref 65–99)
HCG SERPL QL: NEGATIVE
HCO3 BLDA-SCNC: 17.2 MMOL/L (ref 22–28)
HCT VFR BLD AUTO: 40.1 % (ref 34–46.6)
HGB BLD-MCNC: 13.1 G/DL (ref 12–15.9)
HOLD SPECIMEN: NORMAL
IMM GRANULOCYTES # BLD AUTO: 0.02 10*3/MM3 (ref 0–0.05)
IMM GRANULOCYTES NFR BLD AUTO: 0.2 % (ref 0–0.5)
INHALED O2 CONCENTRATION: 21 %
LYMPHOCYTES # BLD AUTO: 2.71 10*3/MM3 (ref 0.7–3.1)
LYMPHOCYTES NFR BLD AUTO: 32.3 % (ref 19.6–45.3)
MAGNESIUM SERPL-MCNC: 2.3 MG/DL (ref 1.6–2.6)
MCH RBC QN AUTO: 32.3 PG (ref 26.6–33)
MCHC RBC AUTO-ENTMCNC: 32.7 G/DL (ref 31.5–35.7)
MCV RBC AUTO: 99 FL (ref 79–97)
METHADONE UR QL SCN: NEGATIVE
MODALITY: ABNORMAL
MONOCYTES # BLD AUTO: 0.44 10*3/MM3 (ref 0.1–0.9)
MONOCYTES NFR BLD AUTO: 5.2 % (ref 5–12)
NEUTROPHILS NFR BLD AUTO: 4.97 10*3/MM3 (ref 1.7–7)
NEUTROPHILS NFR BLD AUTO: 59.2 % (ref 42.7–76)
NRBC BLD AUTO-RTO: 0 /100 WBC (ref 0–0.2)
O2 A-A PPRESDIFF RESPIRATORY: 0.6 MMHG
OPIATES UR QL: NEGATIVE
OXYCODONE UR QL SCN: NEGATIVE
PCO2 BLDA: 27.2 MM HG (ref 35–45)
PH BLDA: 7.41 PH UNITS (ref 7.35–7.45)
PLATELET # BLD AUTO: 261 10*3/MM3 (ref 140–450)
PMV BLD AUTO: 10.3 FL (ref 6–12)
PO2 BLDA: 77.9 MM HG (ref 80–100)
POTASSIUM SERPL-SCNC: 5 MMOL/L (ref 3.5–5.2)
PROT SERPL-MCNC: 7 G/DL (ref 6–8.5)
QT INTERVAL: 411 MS
QT INTERVAL: 421 MS
RBC # BLD AUTO: 4.05 10*6/MM3 (ref 3.77–5.28)
SALICYLATES SERPL-MCNC: <0.3 MG/DL
SAO2 % BLDCOA: 95.8 % (ref 92–99)
SARS-COV-2 RNA RESP QL NAA+PROBE: NOT DETECTED
SODIUM SERPL-SCNC: 141 MMOL/L (ref 136–145)
TOTAL RATE: 12 BREATHS/MINUTE
TROPONIN T SERPL-MCNC: <0.01 NG/ML (ref 0–0.03)
WBC NRBC COR # BLD: 8.4 10*3/MM3 (ref 3.4–10.8)
WHOLE BLOOD HOLD COAG: NORMAL
WHOLE BLOOD HOLD SPECIMEN: NORMAL

## 2022-05-17 PROCEDURE — 36600 WITHDRAWAL OF ARTERIAL BLOOD: CPT

## 2022-05-17 PROCEDURE — 93005 ELECTROCARDIOGRAM TRACING: CPT | Performed by: INTERNAL MEDICINE

## 2022-05-17 PROCEDURE — 81025 URINE PREGNANCY TEST: CPT | Performed by: STUDENT IN AN ORGANIZED HEALTH CARE EDUCATION/TRAINING PROGRAM

## 2022-05-17 PROCEDURE — 84484 ASSAY OF TROPONIN QUANT: CPT | Performed by: EMERGENCY MEDICINE

## 2022-05-17 PROCEDURE — 90791 PSYCH DIAGNOSTIC EVALUATION: CPT

## 2022-05-17 PROCEDURE — 82803 BLOOD GASES ANY COMBINATION: CPT

## 2022-05-17 PROCEDURE — 93010 ELECTROCARDIOGRAM REPORT: CPT | Performed by: INTERNAL MEDICINE

## 2022-05-17 PROCEDURE — 99254 IP/OBS CNSLTJ NEW/EST MOD 60: CPT | Performed by: INTERNAL MEDICINE

## 2022-05-17 PROCEDURE — 70450 CT HEAD/BRAIN W/O DYE: CPT

## 2022-05-17 PROCEDURE — 99253 IP/OBS CNSLTJ NEW/EST LOW 45: CPT | Performed by: NURSE PRACTITIONER

## 2022-05-17 PROCEDURE — 82962 GLUCOSE BLOOD TEST: CPT

## 2022-05-17 PROCEDURE — 25010000002 THIAMINE PER 100 MG: Performed by: INTERNAL MEDICINE

## 2022-05-17 PROCEDURE — U0003 INFECTIOUS AGENT DETECTION BY NUCLEIC ACID (DNA OR RNA); SEVERE ACUTE RESPIRATORY SYNDROME CORONAVIRUS 2 (SARS-COV-2) (CORONAVIRUS DISEASE [COVID-19]), AMPLIFIED PROBE TECHNIQUE, MAKING USE OF HIGH THROUGHPUT TECHNOLOGIES AS DESCRIBED BY CMS-2020-01-R: HCPCS | Performed by: EMERGENCY MEDICINE

## 2022-05-17 PROCEDURE — 72125 CT NECK SPINE W/O DYE: CPT

## 2022-05-17 RX ORDER — ACETAMINOPHEN 650 MG/1
650 SUPPOSITORY RECTAL EVERY 4 HOURS PRN
Status: DISCONTINUED | OUTPATIENT
Start: 2022-05-17 | End: 2022-05-18 | Stop reason: HOSPADM

## 2022-05-17 RX ORDER — LORAZEPAM 2 MG/ML
2 INJECTION INTRAMUSCULAR
Status: DISCONTINUED | OUTPATIENT
Start: 2022-05-17 | End: 2022-05-18 | Stop reason: HOSPADM

## 2022-05-17 RX ORDER — NICOTINE POLACRILEX 4 MG
15 LOZENGE BUCCAL
Status: DISCONTINUED | OUTPATIENT
Start: 2022-05-17 | End: 2022-05-18 | Stop reason: HOSPADM

## 2022-05-17 RX ORDER — ROCURONIUM BROMIDE 10 MG/ML
0.6 INJECTION, SOLUTION INTRAVENOUS EVERY 6 HOURS PRN
Status: DISCONTINUED | OUTPATIENT
Start: 2022-05-17 | End: 2022-05-17

## 2022-05-17 RX ORDER — ONDANSETRON 2 MG/ML
4 INJECTION INTRAMUSCULAR; INTRAVENOUS EVERY 6 HOURS PRN
Status: DISCONTINUED | OUTPATIENT
Start: 2022-05-17 | End: 2022-05-18 | Stop reason: HOSPADM

## 2022-05-17 RX ORDER — ACETAMINOPHEN 325 MG/1
650 TABLET ORAL EVERY 4 HOURS PRN
Status: DISCONTINUED | OUTPATIENT
Start: 2022-05-17 | End: 2022-05-18 | Stop reason: HOSPADM

## 2022-05-17 RX ORDER — ALUMINA, MAGNESIA, AND SIMETHICONE 2400; 2400; 240 MG/30ML; MG/30ML; MG/30ML
15 SUSPENSION ORAL EVERY 6 HOURS PRN
Status: DISCONTINUED | OUTPATIENT
Start: 2022-05-17 | End: 2022-05-18 | Stop reason: HOSPADM

## 2022-05-17 RX ORDER — DEXTROSE MONOHYDRATE 25 G/50ML
25 INJECTION, SOLUTION INTRAVENOUS ONCE
Status: COMPLETED | OUTPATIENT
Start: 2022-05-17 | End: 2022-05-17

## 2022-05-17 RX ORDER — LORAZEPAM 1 MG/1
1 TABLET ORAL EVERY 8 HOURS
Status: DISCONTINUED | OUTPATIENT
Start: 2022-05-18 | End: 2022-05-18 | Stop reason: HOSPADM

## 2022-05-17 RX ORDER — LEVETIRACETAM 500 MG/1
500 TABLET ORAL NIGHTLY
Status: DISCONTINUED | OUTPATIENT
Start: 2022-05-17 | End: 2022-05-18 | Stop reason: HOSPADM

## 2022-05-17 RX ORDER — LORAZEPAM 2 MG/ML
1 INJECTION INTRAMUSCULAR
Status: DISCONTINUED | OUTPATIENT
Start: 2022-05-17 | End: 2022-05-18 | Stop reason: HOSPADM

## 2022-05-17 RX ORDER — LORAZEPAM 1 MG/1
2 TABLET ORAL
Status: DISCONTINUED | OUTPATIENT
Start: 2022-05-17 | End: 2022-05-18 | Stop reason: HOSPADM

## 2022-05-17 RX ORDER — LORAZEPAM 1 MG/1
1 TABLET ORAL
Status: DISCONTINUED | OUTPATIENT
Start: 2022-05-17 | End: 2022-05-18 | Stop reason: HOSPADM

## 2022-05-17 RX ORDER — NICOTINE POLACRILEX 4 MG
15 LOZENGE BUCCAL
Status: CANCELLED | OUTPATIENT
Start: 2022-05-17

## 2022-05-17 RX ORDER — DEXTROSE MONOHYDRATE 25 G/50ML
25 INJECTION, SOLUTION INTRAVENOUS
Status: CANCELLED | OUTPATIENT
Start: 2022-05-17

## 2022-05-17 RX ORDER — SODIUM CHLORIDE 0.9 % (FLUSH) 0.9 %
10 SYRINGE (ML) INJECTION EVERY 12 HOURS SCHEDULED
Status: DISCONTINUED | OUTPATIENT
Start: 2022-05-17 | End: 2022-05-18 | Stop reason: HOSPADM

## 2022-05-17 RX ORDER — SODIUM CHLORIDE 0.9 % (FLUSH) 0.9 %
10 SYRINGE (ML) INJECTION AS NEEDED
Status: DISCONTINUED | OUTPATIENT
Start: 2022-05-17 | End: 2022-05-18 | Stop reason: HOSPADM

## 2022-05-17 RX ORDER — ONDANSETRON 4 MG/1
4 TABLET, FILM COATED ORAL EVERY 6 HOURS PRN
Status: DISCONTINUED | OUTPATIENT
Start: 2022-05-17 | End: 2022-05-18 | Stop reason: HOSPADM

## 2022-05-17 RX ORDER — LORAZEPAM 1 MG/1
1 TABLET ORAL EVERY 6 HOURS
Status: COMPLETED | OUTPATIENT
Start: 2022-05-17 | End: 2022-05-18

## 2022-05-17 RX ORDER — DEXTROSE AND SODIUM CHLORIDE 5; .45 G/100ML; G/100ML
150 INJECTION, SOLUTION INTRAVENOUS CONTINUOUS
Status: DISCONTINUED | OUTPATIENT
Start: 2022-05-17 | End: 2022-05-18

## 2022-05-17 RX ORDER — DEXTROSE MONOHYDRATE 25 G/50ML
25 INJECTION, SOLUTION INTRAVENOUS
Status: DISCONTINUED | OUTPATIENT
Start: 2022-05-17 | End: 2022-05-18 | Stop reason: HOSPADM

## 2022-05-17 RX ORDER — DEXTROSE MONOHYDRATE 25 G/50ML
INJECTION, SOLUTION INTRAVENOUS
Status: COMPLETED
Start: 2022-05-17 | End: 2022-05-17

## 2022-05-17 RX ADMIN — LEVETIRACETAM 500 MG: 500 TABLET, FILM COATED ORAL at 20:28

## 2022-05-17 RX ADMIN — LORAZEPAM 1 MG: 1 TABLET ORAL at 17:03

## 2022-05-17 RX ADMIN — ACETAMINOPHEN 650 MG: 325 TABLET ORAL at 20:34

## 2022-05-17 RX ADMIN — DEXTROSE MONOHYDRATE 25 G: 25 INJECTION, SOLUTION INTRAVENOUS at 03:40

## 2022-05-17 RX ADMIN — DEXTROSE MONOHYDRATE 25 ML: 25 INJECTION, SOLUTION INTRAVENOUS at 00:16

## 2022-05-17 RX ADMIN — LORAZEPAM 1 MG: 1 TABLET ORAL at 12:07

## 2022-05-17 RX ADMIN — Medication 10 ML: at 20:28

## 2022-05-17 RX ADMIN — ACETAMINOPHEN 650 MG: 325 TABLET ORAL at 08:47

## 2022-05-17 RX ADMIN — SODIUM CHLORIDE 1000 ML: 9 INJECTION, SOLUTION INTRAVENOUS at 02:42

## 2022-05-17 RX ADMIN — Medication 10 ML: at 08:08

## 2022-05-17 RX ADMIN — THIAMINE HYDROCHLORIDE 100 MG: 100 INJECTION, SOLUTION INTRAMUSCULAR; INTRAVENOUS at 08:08

## 2022-05-17 RX ADMIN — LEVETIRACETAM 750 MG: 500 TABLET, FILM COATED ORAL at 11:13

## 2022-05-17 RX ADMIN — DEXTROSE AND SODIUM CHLORIDE 150 ML/HR: 5; 450 INJECTION, SOLUTION INTRAVENOUS at 11:00

## 2022-05-17 RX ADMIN — DEXTROSE AND SODIUM CHLORIDE 150 ML/HR: 5; 450 INJECTION, SOLUTION INTRAVENOUS at 04:05

## 2022-05-17 NOTE — ED NOTES
Pt to ER by EMS from home c/o intentional overdose on alcohol and Ambien. 1/2 pint of Vodka and unknown number of Ambien. Pt has expressed desire to kill herself.    This RN is wearing mask, gloves and goggles at all times during all patient interactions.
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
## 2022-05-17 NOTE — CONSULTS
Reviewed chart and interviewed pt.  Introduced self as JAZ therapist that runs JAZ Co-occurring disorder program.  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 Educated on Co-occurring disorders  Educated pt on [facility] JAZ IOP.  Left pt with written information about [facility] JAZ IOP program and 12 step recovery meetings. Gave info on [facility] recovery, Smart recovery, and Celebrate recovery. Recommendation where ever pt go for JAZ treatment to be ASAP upon d/c as risk of relapse goes up every day that not in treatment.   Will f/u with pt when she is feeling better.

## 2022-05-17 NOTE — PROGRESS NOTES
LOS: 0 days   Patient Care Team:  Usama Babin MD as PCP - General (Family Medicine)  Van Cooper MD (Neurology)  Naila Lora MD as Consulting Physician (Obstetrics and Gynecology)  Aleida Thurston MD as Consulting Physician (Dermatology)  Mary Pascual MD as Consulting Physician (Neurology)    Subjective     Patient is new to me today I am picking up coverage from Dr. Nirmal Santoro.  I did discuss the case with him.  I have reviewed multiple records.  Apparently patient was brought in by EMS yesterday with alcohol abuse and overdose of Ambien.  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  She has had a history of some seizures and has history of mood disorder in the emergency department they did imaging of her neck and there was concern for an odontoid fracture.  She was placed in a c-collar and neurosurgery has been consulted.  Patient is awake and alert she admits to drinking and apparently she drinks on a regular basis, she feels her  is controlling she does not like her job..  She says she took a handful of Ambien and gabapentin.  She is on the gabapentin but because she has a seizure disorder.  She is also on Keppra.  She use the Ambien for sleep.  Patient said she did take the medicines to hurt herself.  She has not had a seizure for several months but she does have a history of frequent seizures she has not had any head or neck trauma other than she has really sore neck often after her seizures.  She has 2 teenage children.    Review of Systems:   Currently she is not having any headache she is not really experiencing neck pain currently.  No nausea no shortness of breath or cough and no chest pain.       Objective     Vital Signs  Vital Sign Min/Max for last 24 hours  Temp  Min: 97.7 °F (36.5 °C)  Max: 97.7 °F (36.5 °C)   BP  Min: 103/73  Max: 149/97   Pulse  Min: 75  Max: 88   Resp  Min: 16  Max: 16   SpO2  Min: 95 %  Max: 100  %   No data recorded   Weight  Min: 61.7 kg (136 lb)  Max: 64.5 kg (142 lb 3.2 oz)        Ventilator/Non-Invasive Ventilation Settings (From admission, onward)            None                       Body mass index is 23.66 kg/m².  No intake/output data recorded.  I/O this shift:  In: 3150 [I.V.:2150; IV Piggyback:1000]  Out: -         Physical Exam:  General Appearance: Well-developed white female she is resting in bed she is on a cervical collar.  She is awake and alert she is answering questions she is appropriate  Eyes: Conjunctiva are little injected bilaterally and anicteric, pupils are equal and react to light.  ENT: Mucous membranes are little dry no erythema no exudates, nasal septum midline  Neck: No adenopathy or thyromegaly no jugular venous distention.  I just was reaching through her cervical collar did not remove it but I did not appreciate any spinal tenderness  Lungs: Clear nonlabored symmetric expansion no wheezes rales or rhonchi  Cardiac: Regular rate rhythm no murmur no gallop  Abdomen: Soft nontender no palpable hepatosplenomegaly or masses  : Not examined  Musculoskeletal: Grossly normal  Skin: Warm, dry no jaundice no petechia  Neuro: She is alert and oriented she is cooperative she is following commands she is at good dorsiflexion plantarflexion straight leg raise bilaterally.  Good  bilaterally in upper extremity strength.    Extremities/P Vascular: No clubbing no cyanosis no edema palpable radial and dorsalis pedis pulses bilaterally  MSE: She is tearful but she does not really seem remorseful at this point in time       Labs:  Results from last 7 days   Lab Units 05/16/22  2353   GLUCOSE mg/dL 81   SODIUM mmol/L 141   POTASSIUM mmol/L 5.0   MAGNESIUM mg/dL 2.3   CO2 mmol/L 25.0   CHLORIDE mmol/L 104   ANION GAP mmol/L 12.0   CREATININE mg/dL 0.70   BUN mg/dL 14   BUN / CREAT RATIO  20.0   CALCIUM mg/dL 9.3   ALK PHOS U/L 75   TOTAL PROTEIN g/dL 7.0   ALT (SGPT) U/L 19   AST (SGOT)  U/L 37*   BILIRUBIN mg/dL 0.4   ALBUMIN g/dL 4.40   GLOBULIN gm/dL 2.6     Estimated Creatinine Clearance: 106.6 mL/min (by C-G formula based on SCr of 0.7 mg/dL).      Results from last 7 days   Lab Units 05/16/22  2353   WBC 10*3/mm3 8.40   RBC 10*6/mm3 4.05   HEMOGLOBIN g/dL 13.1   HEMATOCRIT % 40.1   MCV fL 99.0*   MCH pg 32.3   MCHC g/dL 32.7   RDW % 12.1*   RDW-SD fl 44.9   MPV fL 10.3   PLATELETS 10*3/mm3 261   NEUTROPHIL % % 59.2   LYMPHOCYTE % % 32.3   MONOCYTES % % 5.2   EOSINOPHIL % % 3.0   BASOPHIL % % 0.1   IMM GRAN % % 0.2   NEUTROS ABS 10*3/mm3 4.97   LYMPHS ABS 10*3/mm3 2.71   MONOS ABS 10*3/mm3 0.44   EOS ABS 10*3/mm3 0.25   BASOS ABS 10*3/mm3 0.01   IMMATURE GRANS (ABS) 10*3/mm3 0.02   NRBC /100 WBC 0.0     Results from last 7 days   Lab Units 05/17/22  0324   PH, ARTERIAL pH units 7.410   PO2 ART mm Hg 77.9*   PCO2, ARTERIAL mm Hg 27.2*   HCO3 ART mmol/L 17.2*     Results from last 7 days   Lab Units 05/17/22  0310   TROPONIN T ng/mL <0.010                     Microbiology Results (last 10 days)     Procedure Component Value - Date/Time    COVID PRE-OP / PRE-PROCEDURE SCREENING ORDER (NO ISOLATION) - Swab, Nasopharynx [[id number]]  (Normal) Collected: 05/17/22 0201    Lab Status: Final result Specimen: Swab from Nasopharynx Updated: 05/17/22 0246    Narrative:      The following orders were created for panel order COVID PRE-OP / PRE-PROCEDURE SCREENING ORDER (NO ISOLATION) - Swab, Nasopharynx.  Procedure                               Abnormality         Status                     ---------                               -----------         ------                     COVID-19,[facility] IN-HOUSE...[[id number]]  Normal              Final result                 Please view results for these tests on the individual orders.    COVID-19,[facility] IN-HOUSE CEPHEID/CRISTA NP SWAB IN TRANSPORT MEDIA 8-12 HR TAT - Swab, Nasopharynx [[id number]]  (Normal) Collected: 05/17/22 0201    Lab Status: Final result Specimen:  Swab from Nasopharynx Updated: 05/17/22 0246     COVID19 Not Detected    Narrative:      Fact sheet for providers: https://www.fda.gov/media/774754/download     Fact sheet for patients: https://www.fda.gov/media/538837/download              sodium chloride, 10 mL, Intravenous, Q12H  thiamine (VITAMIN B1) IVPB, 100 mg, Intravenous, Daily      dextrose 5 % and sodium chloride 0.45 %, 150 mL/hr, Last Rate: 150 mL/hr (05/17/22 0405)        Diagnostics:  CT Head Without Contrast    Result Date: 5/17/2022  CT HEAD WITHOUT CONTRAST  HISTORY: Fall with altered mental status  COMPARISON: None available.  TECHNIQUE: Axial CT imaging was obtained through the brain. No IV contrast was administered.  FINDINGS: No acute intracranial hemorrhage is seen. Ventricles are normal in size. There is no midline shift or mass effect. No calvarial fracture is seen. Mucous retention cyst is noted within the right ethmoid sinus. Mucosal thickening is noted within the maxillary sinuses bilaterally.      No acute intracranial findings.  Radiation dose reduction techniques were utilized, including automated exposure control and exposure modulation based on body size.  This report was finalized on 5/17/2022 1:41 AM by Dr. Emily Gutierrez M.D.      CT Cervical Spine Without Contrast    Result Date: 5/17/2022  CT OF THE CERVICAL SPINE  HISTORY: Fall  COMPARISON: 03/24/2016  TECHNIQUE: Axial CT imaging was obtained through the spine. Coronal and sagittal reformatted images were obtained.  FINDINGS: There is a transversely oriented lucency involving the tip of the odontoid. Acute fracture cannot be excluded. No additional fractures are seen. Intervertebral disc spaces appear relatively well-maintained. There is no prevertebral soft tissue swelling.  C2-C3: There is no canal stenosis or neural foraminal narrowing. C3-C4: There is no canal stenosis. There is minimal neural foraminal narrowing on the left. C4-C5: There is no canal stenosis or neural  foraminal narrowing. C5-C6. There is mild canal narrowing. There is minimal bilateral neural foraminal narrowing. C6-C7: There is no canal stenosis or neural foraminal narrowing. C7-T1: There is no canal stenosis or neural foraminal narrowing.  Images through the lung apices demonstrate biapical scarring.      Transversely oriented fracture involving the tip of the odontoid. Acute nondisplaced fracture is not excluded. No additional fractures are seen.  FINDINGS were called to Dr. Patel at 1:50 AM.  Radiation dose reduction techniques were utilized, including automated exposure control and exposure modulation based on body size.  This report was finalized on 5/17/2022 1:52 AM by Dr. Emily Gutierrez M.D.      Results for orders placed during the hospital encounter of 05/26/21    Adult Transthoracic Echo Complete W/ Cont if Necessary Per Protocol    Interpretation Summary  · Left ventricular ejection fraction appears to be 61 - 65%.  · Left ventricular diastolic function was normal.  · Normal right ventricular cavity size and systolic function noted.  · The left atrial cavity is mildly dilated.  · Saline test results are negative.  · Mild tricuspid valve regurgitation is present.  · Calculated right ventricular systolic pressure from tricuspid regurgitation is 29 mmHg.  · There is no evidence of pericardial effusion.          Active Hospital Problems    Diagnosis  POA   • Intentional overdose, initial encounter ([facility]) [T55.633Y]  Yes      Resolved Hospital Problems   No resolved problems to display.         Assessment & Plan     1. Drug overdose Ambien patient on suicide precautions as this was not intentional attempt to hurt her self and psychiatric consultation is pending  2. Alcohol abuse she will need set up for treatment but we have to watch for withdrawal.  Sounds like she drinks on a regular basis.  3. Possible odontoid fracture.  Neurosurgery awaiting 1 continue hard collar and she is going need a cervical MRI  but they are okay for her out of bed and activities as long as she wears her collar  4. Abnormal EKG troponins normal cardiology been consulted.  She does have a history of coarctation and VSD that was repaired as an infant.  But apparently EKG changes are new does not look markedly different to a 2020 study that I saw on the computer.  See what cardiology thinks  5. Hypoglycemia dextrose drip  6. Seizure disorder.  I will start Keppra resume her home dose.  I am not going to restart her gabapentin at this time since she is overdosed on it.  We will need to wait a day or 2.    Plan for disposition: Patient does not need further ICU management.  She is going to need treatment for alcohol withdrawal and psychiatric evaluation I am going to ask the hospitalist service to help with alcohol management and see if they will take her on transfer to their service    Samuel Stuart Jr, MD  05/17/22  07:00 EDT    Time: Spent about 42 minutes on patient's care thus far today
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
## 2022-05-17 NOTE — ED PROVIDER NOTES
" EMERGENCY DEPARTMENT ENCOUNTER    CHIEF COMPLAINT  Chief Complaint: Overdose  History given by: EMS/spouse  History limited by: Patient somnolent  Room Number: 19/19  PMD: Usama Babin MD      HPI:  Pt is a 42 y.o. female presents brought by EMS from home with report of fall, found somnolent, at 10 PM, at that time patient reported to her  that she been drinking alcohol and took the remainder of her Ambien prescription.  EMS reported that patient's Ambien bottle that had been filled 5/9/2022 for 15 pills was found empty at the scene.   reports that patient stated that she had taken the pills in an attempt to kill her self.  Spouse reports patient has verbalized a desire to harm herself in the past but is never been admitted for psychiatric reasons and has never made a serious attempt at purposeful self-harm.   reports patient has a drinking problem, does not drink every day but tends to conceal her drinking.  Patient was ambulatory, conversant at 7 PM this evening.   reports patient denied taking her gabapentin and she had gabapentin bottles at her bedside that had \"plenty of pills\" in them at the time that he found the patient.    Duration: 2 hours  Associated Symptoms: Fall, difficult to arouse  Aggravating Factors: Patient reported she took multiple Ambien  Alleviating Factors: Nothing  Treatment before arrival: EMS transport    PAST MEDICAL HISTORY  Active Ambulatory Problems     Diagnosis Date Noted   • Degeneration of intervertebral disc of mid-cervical region 03/08/2016   • Insomnia 05/31/2016   • Chronic pain 05/31/2016   • History of seizure 05/31/2016   • Palpitations 05/31/2016   • Anxiety 10/28/2014   • Other long term (current) drug therapy 02/03/2014   • Neck pain 02/03/2014   • Seizure secondary to subtherapeutic anticonvulsant medication (HCC) 09/05/2013   • Temporal lobe epilepsy (HCC) 09/05/2013   • Thyroid enlarged 12/05/2017   • Fatigue 04/05/2018   • Head " congestion 09/13/2018   • Essential hypertension 06/25/2021     Resolved Ambulatory Problems     Diagnosis Date Noted   • Mood disorder (HCC) 05/31/2016   • Cognitive disorder 10/28/2014   • Noncompliance with medication regimen 02/03/2014   • Post-ictal confusion 09/16/2016     Past Medical History:   Diagnosis Date   • Allergic rhinitis    • Cervicalgia    • Coarctation of aorta    • Memory loss    • Seizure (HCC)    • VSD (ventricular septal defect)        PAST SURGICAL HISTORY  Past Surgical History:   Procedure Laterality Date   • BREAST AUGMENTATION     • COARCTATION OF AORTA EXCISION  1980   • KNEE ACL RECONSTRUCTION  2013   • KNEE CARTILAGE SURGERY  2013       FAMILY HISTORY  Family History   Problem Relation Age of Onset   • Arthritis Mother    • Arthritis Father    • Hypertension Father    • Migraines Sister    • Arthritis Maternal Grandmother    • Cancer Maternal Grandmother    • Arthritis Maternal Grandfather    • Arthritis Paternal Grandmother    • Arthritis Paternal Grandfather    • Heart disease Paternal Grandfather        SOCIAL HISTORY  Social History     Socioeconomic History   • Marital status:    • Number of children: 2   • Years of education: College graduate    Tobacco Use   • Smoking status: Never Smoker   • Smokeless tobacco: Never Used   Vaping Use   • Vaping Use: Never used   Substance and Sexual Activity   • Alcohol use: Yes     Comment: rarely   • Drug use: Not Currently     Types: Other     Comment: Prescribed Zolpidem/Gabapentin   • Sexual activity: Yes     Partners: Male     Birth control/protection: OCP       ALLERGIES  Cefdinir    REVIEW OF SYSTEMS  Review of Systems   Unable to perform ROS: Patient nonverbal       PHYSICAL EXAM  ED Triage Vitals [05/16/22 2320]   Temp Heart Rate Resp BP SpO2   97.7 °F (36.5 °C) 88 16 146/95 97 %      Temp src Heart Rate Source Patient Position BP Location FiO2 (%)   Tympanic -- -- -- --       Physical Exam  Vitals and nursing note reviewed.    Constitutional:       General: She is in acute distress (mild).      Appearance: She is not toxic-appearing.      Comments: Positive gag   HENT:      Head: Normocephalic and atraumatic.   Eyes:      Comments: 5 mm, reactive bilaterally   Cardiovascular:      Rate and Rhythm: Normal rate and regular rhythm.      Pulses:           Posterior tibial pulses are 2+ on the right side and 2+ on the left side.      Heart sounds: Normal heart sounds. No murmur heard.  Pulmonary:      Effort: Pulmonary effort is normal. No respiratory distress.      Breath sounds: Normal breath sounds.   Abdominal:      General: Bowel sounds are normal.      Palpations: Abdomen is soft.      Tenderness: There is no abdominal tenderness. There is no guarding or rebound.   Musculoskeletal:         General: Normal range of motion.      Cervical back: Normal range of motion and neck supple.      Comments: Patient withdraws all extremities to noxious stimuli   Skin:     General: Skin is warm and dry.      Capillary Refill: Capillary refill takes less than 2 seconds.   Neurological:      Comments: Somnolent arouses to tactile   Psychiatric:         Mood and Affect: Affect normal.         LAB RESULTS  Lab Results (last 24 hours)     Procedure Component Value Units Date/Time    CBC & Differential [[id number]]  (Abnormal) Collected: 05/16/22 2353    Specimen: Blood Updated: 05/17/22 0006    Narrative:      The following orders were created for panel order CBC & Differential.  Procedure                               Abnormality         Status                     ---------                               -----------         ------                     CBC Auto Differential[[id number]]        Abnormal            Final result                 Please view results for these tests on the individual orders.    Comprehensive Metabolic Panel [[id number]]  (Abnormal) Collected: 05/16/22 2353    Specimen: Blood Updated: 05/17/22 0033     Glucose 81 mg/dL      BUN 14  mg/dL      Creatinine 0.70 mg/dL      Sodium 141 mmol/L      Potassium 5.0 mmol/L      Comment: Specimen hemolyzed.  Results may be affected.        Chloride 104 mmol/L      CO2 25.0 mmol/L      Calcium 9.3 mg/dL      Total Protein 7.0 g/dL      Albumin 4.40 g/dL      ALT (SGPT) 19 U/L      Comment: Specimen hemolyzed.  Results may be affected.        AST (SGOT) 37 U/L      Comment: Specimen hemolyzed.  Results may be affected.        Alkaline Phosphatase 75 U/L      Total Bilirubin 0.4 mg/dL      Globulin 2.6 gm/dL      A/G Ratio 1.7 g/dL      BUN/Creatinine Ratio 20.0     Anion Gap 12.0 mmol/L      eGFR 110.9 mL/min/1.73      Comment: National Kidney Foundation and American Society of Nephrology (ASN) Task Force recommended calculation based on the Chronic Kidney Disease Epidemiology Collaboration (CKD-EPI) equation refit without adjustment for race.       Narrative:      GFR Normal >60  Chronic Kidney Disease <60  Kidney Failure <15      Acetaminophen Level [[id number]]  (Normal) Collected: 05/16/22 2353    Specimen: Blood Updated: 05/17/22 0023     Acetaminophen <5.0 mcg/mL     Ethanol [[id number]]  (Abnormal) Collected: 05/16/22 2353    Specimen: Blood Updated: 05/17/22 0044     Ethanol 168 mg/dL      Comment: Specimen hemolyzed.  Results may be affected.        Ethanol % 0.168 %     Salicylate Level [[id number]]  (Normal) Collected: 05/16/22 2353    Specimen: Blood Updated: 05/17/22 0023     Salicylate <0.3 mg/dL     Narrative:      Therapeutic range for Salicylates:  3.0 - 10.0 mg/dL for antipyretic/analgesic conditions  15.0 - 30.0 mg/dL for anti-inflammatory conditions    hCG, Serum, Qualitative [[id number]]  (Normal) Collected: 05/16/22 2353    Specimen: Blood Updated: 05/17/22 0019     HCG Qualitative Negative    Magnesium [[id number]]  (Normal) Collected: 05/16/22 2353    Specimen: Blood Updated: 05/17/22 0023     Magnesium 2.3 mg/dL     CBC Auto Differential [[id number]]  (Abnormal) Collected: 05/16/22 2353     Specimen: Blood Updated: 05/17/22 0006     WBC 8.40 10*3/mm3      RBC 4.05 10*6/mm3      Hemoglobin 13.1 g/dL      Hematocrit 40.1 %      MCV 99.0 fL      MCH 32.3 pg      MCHC 32.7 g/dL      RDW 12.1 %      RDW-SD 44.9 fl      MPV 10.3 fL      Platelets 261 10*3/mm3      Neutrophil % 59.2 %      Lymphocyte % 32.3 %      Monocyte % 5.2 %      Eosinophil % 3.0 %      Basophil % 0.1 %      Immature Grans % 0.2 %      Neutrophils, Absolute 4.97 10*3/mm3      Lymphocytes, Absolute 2.71 10*3/mm3      Monocytes, Absolute 0.44 10*3/mm3      Eosinophils, Absolute 0.25 10*3/mm3      Basophils, Absolute 0.01 10*3/mm3      Immature Grans, Absolute 0.02 10*3/mm3      nRBC 0.0 /100 WBC     Urine Drug Screen - Urine, Catheter [[id number]]  (Normal) Collected: 05/16/22 2354    Specimen: Urine, Catheter Updated: 05/17/22 0053     Amphet/Methamphet, Screen Negative     Barbiturates Screen, Urine Negative     Benzodiazepine Screen, Urine Negative     Cocaine Screen, Urine Negative     Opiate Screen Negative     THC, Screen, Urine Negative     Methadone Screen, Urine Negative     Oxycodone Screen, Urine Negative    Narrative:      Negative Thresholds Per Drugs Screened:    Amphetamines                 500 ng/ml  Barbiturates                 200 ng/ml  Benzodiazepines              100 ng/ml  Cocaine                      300 ng/ml  Methadone                    300 ng/ml  Opiates                      300 ng/ml  Oxycodone                    100 ng/ml  THC                           50 ng/ml    The Normal Value for all drugs tested is negative. This report includes final unconfirmed screening results to be used for medical treatment purposes only. Unconfirmed results must not be used for non-medical purposes such as employment or legal testing. Clinical consideration should be applied to any drug of abuse test, particularly when unconfirmed results are used.            POC Glucose Once [[id number]]  (Abnormal) Collected: 05/16/22 2354     Specimen: Blood Updated: 05/16/22 2355     Glucose 63 mg/dL      Comment: Meter: [id number] : [id number] Sean Licea NA       POC Glucose Once [[id number]]  (Normal) Collected: 05/17/22 0101    Specimen: Blood Updated: 05/17/22 0103     Glucose 112 mg/dL      Comment: Meter: [id number] : [id number] Sean Licea NA       COVID PRE-OP / PRE-PROCEDURE SCREENING ORDER (NO ISOLATION) - Swab, Nasopharynx [[id number]] Collected: 05/17/22 0201    Specimen: Swab from Nasopharynx Updated: 05/17/22 0207    Narrative:      The following orders were created for panel order COVID PRE-OP / PRE-PROCEDURE SCREENING ORDER (NO ISOLATION) - Swab, Nasopharynx.  Procedure                               Abnormality         Status                     ---------                               -----------         ------                     COVID-19,[facility] IN-HOUSE...[[id number]]                      In process                   Please view results for these tests on the individual orders.    COVID-19,[facility] IN-HOU[facility] CEPHEID/CRISTA NP SWAB IN TRANSPORT MEDIA 8-12 HR TAT - Swab, Nasopharynx [[id number]] Collected: 05/17/22 0201    Specimen: Swab from Nasopharynx Updated: 05/17/22 0207    POC Glucose Once [[id number]]  (Normal) Collected: 05/17/22 0207    Specimen: Blood Updated: 05/17/22 0208     Glucose 78 mg/dL      Comment: Meter: [id number] : [id number] Jeronimo SUAREZ             I ordered the above labs and reviewed the results    RADIOLOGY  CT Head Without Contrast   Final Result   No acute intracranial findings.       Radiation dose reduction techniques were utilized, including automated   exposure control and exposure modulation based on body size.       This report was finalized on 5/17/2022 1:41 AM by Dr. Emily Gutierrez M.D.          CT Cervical Spine Without Contrast   Final Result   Transversely oriented fracture involving the tip of the odontoid. Acute   nondisplaced fracture is not excluded. No additional fractures  are seen.       FINDINGS were called to Dr. Patel at 1:50 AM.       Radiation dose reduction techniques were utilized, including automated   exposure control and exposure modulation based on body size.       This report was finalized on 5/17/2022 1:52 AM by Dr. Emily Gutierrez M.D.               I ordered the above noted radiological studies. Interpreted by radiologist. Viewed by me in PACS.       PROCEDURES  Critical Care  Performed by: Tita Patel MD  Authorized by: Tita Patel MD     Critical care provider statement:     Critical care time (minutes):  47    Critical care time was exclusive of:  Separately billable procedures and treating other patients    Critical care was necessary to treat or prevent imminent or life-threatening deterioration of the following conditions:  Toxidrome    Critical care was time spent personally by me on the following activities:  Ordering and performing treatments and interventions, ordering and review of laboratory studies, development of treatment plan with patient or surrogate, discussions with consultants, ordering and review of radiographic studies, pulse oximetry, discussions with primary provider, evaluation of patient's response to treatment, re-evaluation of patient's condition, examination of patient, obtaining history from patient or surrogate and review of old charts          PROGRESS AND CONSULTS  ED Course as of 05/17/22 0252   Mon May 16, 2022   8108 Discussed with  at bedside who reports patient was ambulatory, conversant at 7 PM, albeit agitated.  He reports he was on a different floor the house, heard her fall, was at her side within seconds without seizure-like activity, patient found on the floor, somnolent, reported at that time that she had been drinking alcohol and reported she had taken her Ambien in an attempt to kill herself.   reports the patient denied taking any gabapentin when he specifically asked about this but readily reported  "that she had taken Ambien and alcohol [TO]   Tue May 17, 2022   0010 Discussed with poison control \"Jan\" who reports Ambien expect sedation for up to 8 hours, gabapentin half-life 6 hours, encourage IV fluids secondary to renal elimination.  Anticipate 6 to 8 hours at minimum observation for medical clearance as gabapentin is unknown component of ingestion [TO]   0058 Patient seen, reexamined, localizes to sternal rub, nonverbal, withdraws all extremities to pain, gag present, vital stable [TO]   0215 Discussed with , neurosurgery who is aware of patient's presentation, has reviewed her imaging imaging,  and will see in consult for further testing, treatment as needed [TO]   0232 Patient seen and reexamined, withdraws all 4 extremities to noxious stimuli, shaking her left hand repeatedly after pressure on nailbed, attempts to mumble with sternal pressure, patient has intact gag, blood pressure 114/83, heart rate in the 80s, oxygen saturations 96% on room air [TO]   0234 Discussed with Dr. Nirmal Santoro, patient's CT imaging, presentation, labs, imaging, aware of my conversation with Dr. Crabtree who will see in consult and accepts to the ICU for further testing, treatment as needed. [TO]   0250 Dr. Santoro at bedside, has reviewed EKG results, examined patient, agrees with close monitoring, request troponins and ABG and will follow when patient arrives to ICU [TO]      ED Course User Index  [TO] Tita Patel MD     EKG          EKG time: 2359  Rhythm/Rate: Atrial rhythm, rate in the 80s  P waves and CO: P waves noted prior to every QRS, CO within normal limits  QRS, axis: IVCD  ST and T waves: Nonspecific ST/T wave findings    Interpreted Contemporaneously by me, independently viewed  Mildly changed compared to prior  12/23/2020, QT slightly longer, persistent IVCD        MEDICAL DECISION MAKING  Results were reviewed/discussed with the patient and they were also made aware of online access. Pt also made " aware that some labs, such as cultures, will not be resulted during ER visit and followup with PMD is necessary.       MDM       DIAGNOSIS  Final diagnoses:   Intentional overdose, initial encounter (HCC)   Alcoholic intoxication with complication (HCC)   Abnormal CT scan, cervical spine       DISPOSITION  ADMISSION    Discussed treatment plan and reason for admission with pt/family and admitting physician.  Pt/family voiced understanding of the plan for admission for further testing/treatment as needed.         Latest Documented Vital Signs:  As of 02:42 EDT  BP- 114/83 HR- 77 Temp- 97.7 °F (36.5 °C) (Tympanic) O2 sat- 96%    --  Patient was wearing facemask when I entered the room and throughout our encounter. Full protective equipment was worn throughout this patient encounter including a face mask, eye protection and gloves. Hand hygiene was performed before donning protective equipment and after removal when leaving the room.      Tita Patel MD  05/17/22 0252

## 2022-05-17 NOTE — PLAN OF CARE
"Goal Outcome Evaluation:              Outcome Evaluation: Admitted from ED. Patient remains drowsy, but intermittently following commands and speaking. BG treated per hypoglycemia protocol. D51/2NS initiated per orders.  updated regarding plan of care, access code given. Reports patient has talked about everyone \"being better off\" without her previously, but denies previous suicide attempt. Reports patient drinks 1-2 times/week, but drinks excessively those days and tries to hide it. Generally related to stress. Vitals stable.      Problem: Suicide Risk  Goal: Absence of Self-Harm  Outcome: Ongoing, Not Progressing  Intervention: Promote Psychosocial Wellbeing  Recent Flowsheet Documentation  Taken 5/17/2022 0400 by Elisabet Parkinson RN  Supportive Measures: verbalization of feelings encouraged  Family/Support System Care: involvement promoted  Sleep/Rest Enhancement:   awakenings minimized   therapeutic touch utilized     Problem: Fall Injury Risk  Goal: Absence of Fall and Fall-Related Injury  Outcome: Ongoing, Not Progressing  Intervention: Identify and Manage Contributors  Recent Flowsheet Documentation  Taken 5/17/2022 0400 by Elisabet Parkinson RN  Medication Review/Management: medications reviewed  Intervention: Promote Injury-Free Environment  Recent Flowsheet Documentation  Taken 5/17/2022 0600 by Elisabet Parkinson RN  Safety Promotion/Fall Prevention:   activity supervised   fall prevention program maintained   safety round/check completed  Taken 5/17/2022 0500 by Elisabet Parkinson RN  Safety Promotion/Fall Prevention:   activity supervised   fall prevention program maintained   safety round/check completed  Taken 5/17/2022 0400 by Elisabet Parkinson RN  Safety Promotion/Fall Prevention:   activity supervised   fall prevention program maintained   room organization consistent     Problem: Skin Injury Risk Increased  Goal: Skin Health and Integrity  Outcome: Ongoing, Not Progressing  Intervention: " Optimize Skin Protection  Recent Flowsheet Documentation  Taken 5/17/2022 0600 by Elisabet Parkinson, RN  Head of Bed ([facility]) Positioning: HOB at 30 degrees  Taken 5/17/2022 0400 by Elisabet Parkinson, RN  Head of Bed ([facility]) Positioning: HOB at 45 degrees  Skin Protection:   tubing/devices free from skin contact   adhesive use limited   incontinence pads utilized
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
## 2022-05-17 NOTE — CONSULTS
Name: Sharron Guardado ADMIT: 2022   : 1980  PCP: Usama Babin MD    MRN: [id number] LOS: 0 days   AGE/SEX: 42 y.o. female  ROOM: [facility]     Subjective   Subjective   NAEO. Evaluated by NSG and psychiatry today.   On room air. Oriented x3     Objective   Objective   Vital Signs  Temp:  [97.7 °F (36.5 °C)-98.4 °F (36.9 °C)] 98.4 °F (36.9 °C)  Heart Rate:  [75-93] 80  Resp:  [14-16] 16  BP: (103-155)/() 113/81  SpO2:  [95 %-100 %] 97 %  on   ;   Device (Oxygen Therapy): room air  Body mass index is 23.66 kg/m².     Physical Exam  Constitutional:       Appearance: Normal appearance.   Eyes:      Extraocular Movements: Extraocular movements intact.      Pupils: Pupils are equal, round, and reactive to light.   Neck:      Comments: Neck in cervical collar   Cardiovascular:      Rate and Rhythm: Normal rate and regular rhythm.   Pulmonary:      Effort: Pulmonary effort is normal.      Breath sounds: Normal breath sounds.   Skin:     General: Skin is warm and dry.   Neurological:      Mental Status: She is alert and oriented to person, place, and time.       .aspe  Results Review:       I reviewed the patient's new clinical results.  Results from last 7 days   Lab Units 22  2353   WBC 10*3/mm3 8.40   HEMOGLOBIN g/dL 13.1   PLATELETS 10*3/mm3 261     Results from last 7 days   Lab Units 22  2353   SODIUM mmol/L 141   POTASSIUM mmol/L 5.0   CHLORIDE mmol/L 104   CO2 mmol/L 25.0   BUN mg/dL 14   CREATININE mg/dL 0.70   GLUCOSE mg/dL 81   Estimated Creatinine Clearance: 106.6 mL/min (by C-G formula based on SCr of 0.7 mg/dL).  Results from last 7 days   Lab Units 22  2353   ALBUMIN g/dL 4.40   BILIRUBIN mg/dL 0.4   ALK PHOS U/L 75   AST (SGOT) U/L 37*   ALT (SGPT) U/L 19     Results from last 7 days   Lab Units 22  2353   CALCIUM mg/dL 9.3   ALBUMIN g/dL 4.40   MAGNESIUM mg/dL 2.3       Glucose   Date/Time Value Ref Range Status   2022 1134 102 70 - 130 mg/dL Final      Comment:     Meter: [id number] : [id number] Rohan Farr NA   05/17/2022 0357 112 70 - 130 mg/dL Final     Comment:     Meter: [id number] : [id number] Iggy Bhandari RAYMOND   05/17/2022 0336 57 (L) 70 - 130 mg/dL Final     Comment:     Meter: [id number] : [id number] Carol London NA   05/17/2022 0310 69 (L) 70 - 130 mg/dL Final     Comment:     Meter: [id number] : [id number] Dallas Anuja NA   05/17/2022 0207 78 70 - 130 mg/dL Final     Comment:     Meter: [id number] : [id number] Jeronimo Mireles NA   05/17/2022 0101 112 70 - 130 mg/dL Final     Comment:     Meter: [id number] : [id number] Sean Anuja NA   05/16/2022 2354 63 (L) 70 - 130 mg/dL Final     Comment:     Meter: [id number] : [id number] Sean Anuja NA       levETIRAcetam, 500 mg, Oral, Nightly  levETIRAcetam, 750 mg, Oral, Q AM  LORazepam, 1 mg, Oral, Q6H   Followed by  [START ON 5/18/2022] LORazepam, 1 mg, Oral, Q8H  sodium chloride, 10 mL, Intravenous, Q12H  thiamine (VITAMIN B1) IVPB, 100 mg, Intravenous, Daily      dextrose 5 % and sodium chloride 0.45 %, 150 mL/hr, Last Rate: 150 mL/hr (05/17/22 1100)    Diet Regular       Assessment/Plan     Active Hospital Problems    Diagnosis  POA   • **Intentional overdose, initial encounter ([facility]) [T50.902A]  Yes   • Closed odontoid fracture with type I morphology ([facility]) [S12.100A]  Yes   • Alcohol abuse [F10.10]  Yes   • Suicide attempt ([facility]) [T14.91XA]  Yes   • VSD (ventricular septal defect) [Q21.0]  Not Applicable   • Coarctation of aorta [Q25.1]  Not Applicable   • Essential hypertension [I10]  Yes   • History of seizure [Z87.898]  Yes      Resolved Hospital Problems   No resolved problems to display.     Hospital course to date:  Mrs. Guardado is a 42 year old female who presented to the hospital with intentional overdose and suicidal ideation. She apparently drank 1/2 pint of vodka as well as ingested an unknown amount of Ambien. She fell at home and  found her and brought  her in.   In the ED, CT cervical spine showed concerns for odontoid fracture and [facility] was contacted. She was placed in a c-collar and moved to the ICU for monitoring.    She has a known history of coarctation and VSD that were repaired as an infant and had some EKG changes prompting cardiology evaluation.    She also has a history of seizures on Keppra and gabapentin. Her gabapentin was held d/t her self harm. [facility] as well as Cardiology evaluated and she was cleared to move out of the ICU. Psychiatry was consulted d/t her suicide attempt.    · Intentional overdose/suicide attempt: Monitored overnight in ICU. Pulmonology admitted. Psychiatry following and she remains in suicide precautions.  · Alcohol abuse: CIWA protocol in place. Treat with Ativan as needed. Access following. Vitamin replacment.  · Closed odontoid fracture: [facility] following. In c-collar with MRI ordered.  · VSD/coarctation of aorta repaired as infant: EKG changes on admission that resolved the next morning. Cardiology has seen. No further work up and follow up outpatient as scheduled.  · HTN: BP stable/intermittently elevated from withdrawal. Monitor trends.  · History of seizure: Keppra resumed. Seizure precautions. Holding gabapentin for her self-harm issues.    Discussed with patient.  Medicine will take over care.       VTE Prophylaxis - SCDs  Code Status - Full code  Disposition - Anticipate discharge TBD. Inpatient psych unit is still being considered.      MD Nicole Bryant APRHUE  [facility]  05/17/22  15:40 EDT

## 2022-05-18 ENCOUNTER — READMISSION MANAGEMENT (OUTPATIENT)
Dept: CALL CENTER | Facility: HOSPITAL | Age: 42
End: 2022-05-18

## 2022-05-18 ENCOUNTER — APPOINTMENT (OUTPATIENT)
Dept: MRI IMAGING | Facility: HOSPITAL | Age: 42
End: 2022-05-18

## 2022-05-18 VITALS
TEMPERATURE: 98.2 F | HEIGHT: 65 IN | OXYGEN SATURATION: 97 % | SYSTOLIC BLOOD PRESSURE: 160 MMHG | WEIGHT: 142.2 LBS | DIASTOLIC BLOOD PRESSURE: 94 MMHG | HEART RATE: 79 BPM | BODY MASS INDEX: 23.69 KG/M2 | RESPIRATION RATE: 18 BRPM

## 2022-05-18 LAB
ALBUMIN SERPL-MCNC: 3.5 G/DL (ref 3.5–5.2)
ALBUMIN/GLOB SERPL: 2.3 G/DL
ALP SERPL-CCNC: 64 U/L (ref 39–117)
ALT SERPL W P-5'-P-CCNC: 13 U/L (ref 1–33)
ANION GAP SERPL CALCULATED.3IONS-SCNC: 9 MMOL/L (ref 5–15)
AST SERPL-CCNC: 17 U/L (ref 1–32)
BILIRUB SERPL-MCNC: 0.4 MG/DL (ref 0–1.2)
BUN SERPL-MCNC: 12 MG/DL (ref 6–20)
BUN/CREAT SERPL: 16 (ref 7–25)
CALCIUM SPEC-SCNC: 7.6 MG/DL (ref 8.6–10.5)
CHLORIDE SERPL-SCNC: 107 MMOL/L (ref 98–107)
CO2 SERPL-SCNC: 20 MMOL/L (ref 22–29)
CREAT SERPL-MCNC: 0.75 MG/DL (ref 0.57–1)
EGFRCR SERPLBLD CKD-EPI 2021: 102.1 ML/MIN/1.73
GLOBULIN UR ELPH-MCNC: 1.5 GM/DL
GLUCOSE BLDC GLUCOMTR-MCNC: 102 MG/DL (ref 70–130)
GLUCOSE BLDC GLUCOMTR-MCNC: 88 MG/DL (ref 70–130)
GLUCOSE BLDC GLUCOMTR-MCNC: 91 MG/DL (ref 70–130)
GLUCOSE SERPL-MCNC: 171 MG/DL (ref 65–99)
POTASSIUM SERPL-SCNC: 3.6 MMOL/L (ref 3.5–5.2)
PROT SERPL-MCNC: 5 G/DL (ref 6–8.5)
SODIUM SERPL-SCNC: 136 MMOL/L (ref 136–145)

## 2022-05-18 PROCEDURE — 99231 SBSQ HOSP IP/OBS SF/LOW 25: CPT | Performed by: NURSE PRACTITIONER

## 2022-05-18 PROCEDURE — 25010000002 THIAMINE PER 100 MG: Performed by: INTERNAL MEDICINE

## 2022-05-18 PROCEDURE — 82962 GLUCOSE BLOOD TEST: CPT

## 2022-05-18 PROCEDURE — 72141 MRI NECK SPINE W/O DYE: CPT

## 2022-05-18 PROCEDURE — 80053 COMPREHEN METABOLIC PANEL: CPT | Performed by: INTERNAL MEDICINE

## 2022-05-18 RX ADMIN — LEVETIRACETAM 750 MG: 500 TABLET, FILM COATED ORAL at 05:45

## 2022-05-18 RX ADMIN — DEXTROSE AND SODIUM CHLORIDE 150 ML/HR: 5; 450 INJECTION, SOLUTION INTRAVENOUS at 01:57

## 2022-05-18 RX ADMIN — LORAZEPAM 1 MG: 1 TABLET ORAL at 11:16

## 2022-05-18 RX ADMIN — LORAZEPAM 1 MG: 1 TABLET ORAL at 05:45

## 2022-05-18 RX ADMIN — LORAZEPAM 1 MG: 1 TABLET ORAL at 00:20

## 2022-05-18 RX ADMIN — LORAZEPAM 1 MG: 1 TABLET ORAL at 20:25

## 2022-05-18 RX ADMIN — Medication 10 ML: at 08:05

## 2022-05-18 RX ADMIN — THIAMINE HYDROCHLORIDE 100 MG: 100 INJECTION, SOLUTION INTRAMUSCULAR; INTRAVENOUS at 08:06

## 2022-05-18 NOTE — PROGRESS NOTES
The patient seems to be in good spirits today and reports no suicidal or homicidal ideation and is reporting reduced depressive symptoms.  Unfortunately, I am unable to reach the patient's  for collateral information and do not feel comfortable discontinuing her sitter or clearing for discharge until his input has been received.  I have left a message for him requesting that he call the access center at his earliest convenience.

## 2022-05-18 NOTE — NURSING NOTE
Plan of care discussed with primary RN. Dr. Guaman previously discussed plan of care with [facility] and was agreeable to discharge home to patient's  Jase if Jase felt he could safely care for patient and provide for safety. Jase called [facility] and spoke with intake and is in agreement with that plan and he can provide safety due to her starting IOP tomorrow, which has already been coordinated.

## 2022-05-18 NOTE — PROGRESS NOTES
[facility] NEUROSURGERY PROGRESS NOTE      CC: HD #2 overdose, found down      Subjective     Interval History:  MRI cervical spine completed.  No events reported overnight.  Moving all extremities, states that she continues to have her normal neck pain r/t exercise injury several years ago.  Denies any other pain, weakness, or numbness/tingling.     ROS:  Constitutional: No fever, chills  Neck:  neck pain, chronic  GI: No nausea, vomiting, no swallow difficulties  Neuro: No numbness, tingling, or weakness,  no balance difficulties  : no difficulty voiding, no incontinence    Objective     Vital signs in last 24 hours:  Temp:  [97.6 °F (36.4 °C)-98.8 °F (37.1 °C)] 98.6 °F (37 °C)  Heart Rate:  [64-85] 80  Resp:  [14-18] 18  BP: (109-169)/() 169/92    Intake/Output this shift:  I/O this shift:  In: 290 [P.O.:290]  Out: 1350 [Urine:1350]    LABS:  Results from last 7 days   Lab Units 05/16/22  2353   WBC 10*3/mm3 8.40   HEMOGLOBIN g/dL 13.1   HEMATOCRIT % 40.1   PLATELETS 10*3/mm3 261     Results from last 7 days   Lab Units 05/18/22  0339 05/16/22  2353   SODIUM mmol/L 136 141   POTASSIUM mmol/L 3.6 5.0   CHLORIDE mmol/L 107 104   CO2 mmol/L 20.0* 25.0   BUN mg/dL 12 14   CREATININE mg/dL 0.75 0.70   CALCIUM mg/dL 7.6* 9.3   BILIRUBIN mg/dL 0.4 0.4   ALK PHOS U/L 64 75   ALT (SGPT) U/L 13 19   AST (SGOT) U/L 17 37*   GLUCOSE mg/dL 171* 81       IMAGING STUDIES:  MRI Cervical Spine Without Contrast    Result Date: 5/18/2022  1. There is very minimal cervical spondylosis centered at C5-6 where there is minimal posterior spurring and right uncovertebral joint hypertrophy and there is equivocal minimal right foraminal narrowing at the C5-6 cervical level. 2. The remainder of the MRI of the cervical spine is within normal limits. 3. On yesterday morning's cervical spine CT, 05/17/2022 at 1:16 AM, there was question of a fracture of the superior tip of the odontoid process, I think that this is a marginal spur and it  is unchanged in appearance when compared to an outside cervical spine CT on 09/15/2016 and is not felt to be a fracture and I see no bone marrow edema, no acute osseous abnormality in the cervical spine. Results were communicated to Halley Dos Santos by telephone 05/18/2022 at 12:05 PM.      I personally reviewed and interpreted the patient's report and imaging.    Meds reviewed/changed: Yes    Current Facility-Administered Medications:   •  acetaminophen (TYLENOL) tablet 650 mg, 650 mg, Oral, Q4H PRN, 650 mg at 05/17/22 2034 **OR** acetaminophen (TYLENOL) suppository 650 mg, 650 mg, Rectal, Q4H PRN, Samuel Stuart Jr., MD  •  aluminum-magnesium hydroxide-simethicone (MAALOX MAX) 400-400-40 MG/5ML suspension 15 mL, 15 mL, Oral, Q6H PRN, Samuel Stuart Jr., MD  •  dextrose (D50W) (25 g/50 mL) IV injection 25 g, 25 g, Intravenous, Q15 Min PRN, Samuel Stuart Jr., MD, 25 g at 05/17/22 0340  •  dextrose (GLUTOSE) oral gel 15 g, 15 g, Oral, Q15 Min PRN, Samuel Stuart Jr., MD  •  glucagon (human recombinant) (GLUCAGEN DIAGNOSTIC) injection 1 mg, 1 mg, Subcutaneous, Q15 Min PRN, Samuel Stuart Jr., MD  •  levETIRAcetam (KEPPRA) tablet 500 mg, 500 mg, Oral, Nightly, Samuel Stuart Jr., MD, 500 mg at 05/17/22 2028  •  levETIRAcetam (KEPPRA) tablet 750 mg, 750 mg, Oral, Q AM, Samuel Stuart Jr., MD, 750 mg at 05/18/22 0545  •  LORazepam (ATIVAN) tablet 1 mg, 1 mg, Oral, Q2H PRN **OR** LORazepam (ATIVAN) injection 1 mg, 1 mg, Intravenous, Q2H PRN **OR** LORazepam (ATIVAN) tablet 2 mg, 2 mg, Oral, Q1H PRN **OR** LORazepam (ATIVAN) injection 2 mg, 2 mg, Intravenous, Q1H PRN **OR** LORazepam (ATIVAN) injection 2 mg, 2 mg, Intravenous, Q15 Min PRN **OR** LORazepam (ATIVAN) injection 2 mg, 2 mg, Intramuscular, Q15 Min PRN, Samuel Stuart Jr., MD  •  [COMPLETED] LORazepam (ATIVAN) tablet 1 mg, 1 mg, Oral, Q6H, 1 mg at 05/18/22 0545 **FOLLOWED BY** LORazepam (ATIVAN) tablet 1 mg, 1 mg, Oral, Q8H,  Samuel Stuart Jr., MD, 1 mg at 05/18/22 1116  •  ondansetron (ZOFRAN) tablet 4 mg, 4 mg, Oral, Q6H PRN **OR** ondansetron (ZOFRAN) injection 4 mg, 4 mg, Intravenous, Q6H PRN, Samuel Stuart Jr., MD  •  sodium chloride 0.9 % flush 10 mL, 10 mL, Intravenous, PRN, Samuel Stuart Jr., MD  •  sodium chloride 0.9 % flush 10 mL, 10 mL, Intravenous, Q12H, Samuel Stuart Jr., MD, 10 mL at 05/18/22 0805  •  sodium chloride 0.9 % flush 10 mL, 10 mL, Intravenous, PRN, Samuel Stuart Jr., MD  •  [START ON 5/19/2022] thiamine (VITAMIN B-1) tablet 100 mg, 100 mg, Oral, Once, Samuel Stuart Jr., MD      Physical Exam:    General:   Awake, alert, oriented x3. Speech clear   HEENT:    Atraumatic, normocephalic  Neck: Pain with certain movements, chronic  Motor: Normal muscle strength, bulk and tone in upper and lower extremities.  No fasciculations, rigidity, spasticity, or abnormal movements.  Sensation: Normal to light touch  Coordination: Moving all extremities equal  Station and Gait:             Normal gait and station.   Extremities:   SCD in place    Assessment & Plan     ASSESSMENT:      Intentional overdose, initial encounter ([facility])    History of seizure    Essential hypertension    Closed odontoid fracture with type I morphology ([facility])    Alcohol abuse    Suicide attempt ([facility])    VSD (ventricular septal defect)    Coarctation of aorta    43yo female found down, after intentional overdose.  CT neck revealed possible odontoid fracture and she was placed in a cervical collar.     PLAN:  MRI of the cervical spine reviewed and question of fracture of the superior tip of the odontoid process, is found to be a spur and it is unchanged in appearance, when compared to outside films.    No [place] collar needed  Neurosurgery will sign off and continue to be available for any questions, or concerns.  No f/up needed.     I discussed the patient's findings and my recommendations with patient, family, nursing  "staff and Dr. Young       LOS: 1 day       Marjorie Benitez, APRN  5/18/2022  13:18 EDT    \"Dictated utilizing Dragon dictation\".      "

## 2022-05-18 NOTE — NURSING NOTE
"Access follow up.    Pt reports depression as a 0-1/10 (10= worst). Pt reports anxiety as a 2-3/10 (10= worst).    Pt states \"I am doing much better\".     Pt denies SI/HI/AVH.    Pt denies etoh cravings. Pt denies withdrawal symptoms. Last CIWA score=0. Pt declines further information for behavioral health services. Access previously some resources for phychiatric services.   Psychiatrist following. Psychiatrist to decide if inpatient outpatient treatment is necessary.     Sitter in place.   "

## 2022-05-18 NOTE — PROGRESS NOTES
Name: Sharron Guardado ADMIT: 2022   : 1980  PCP: Usama Babin MD    MRN: [id number] LOS: 1 days   AGE/SEX: 42 y.o. female  ROOM: [facility]     Subjective   Subjective   Sitting up in bed. Mother and sister as well as sitter at bedside. Dr. Guaman has been trying to reach her  apparently and this will determine timing of discharge. Patient denies any chest pain, dyspnea, cough, fever or chills. She is eating and drinking. Denies any current SI.     Objective   Objective   Vital Signs  Temp:  [97.6 °F (36.4 °C)-98.8 °F (37.1 °C)] 98.2 °F (36.8 °C)  Heart Rate:  [64-85] 79  Resp:  [14-18] 18  BP: (113-176)/() 176/96  SpO2:  [95 %-100 %] 97 %  on   ;   Device (Oxygen Therapy): room air  Body mass index is 23.66 kg/m².     Physical Exam  Vitals and nursing note reviewed.   Constitutional:       Appearance: She is not toxic-appearing.   Cardiovascular:      Rate and Rhythm: Normal rate and regular rhythm.      Pulses: Normal pulses.      Heart sounds: Normal heart sounds.   Pulmonary:      Effort: Pulmonary effort is normal. No respiratory distress.      Breath sounds: Normal breath sounds.   Abdominal:      General: Bowel sounds are normal. There is no distension.      Palpations: Abdomen is soft.      Tenderness: There is no abdominal tenderness.   Musculoskeletal:         General: No swelling. Normal range of motion.      Cervical back: Normal range of motion and neck supple.   Skin:     General: Skin is warm and dry.      Findings: No bruising.   Neurological:      Mental Status: She is alert and oriented to person, place, and time.      Sensory: No sensory deficit.      Coordination: Coordination normal.   Psychiatric:         Mood and Affect: Mood normal.         Behavior: Behavior normal.        Results Review:       I reviewed the patient's new clinical results.  Results from last 7 days   Lab Units 22  2353   WBC 10*3/mm3 8.40   HEMOGLOBIN g/dL 13.1   PLATELETS 10*3/mm3  261     Results from last 7 days   Lab Units 05/18/22  0339 05/16/22  2353   SODIUM mmol/L 136 141   POTASSIUM mmol/L 3.6 5.0   CHLORIDE mmol/L 107 104   CO2 mmol/L 20.0* 25.0   BUN mg/dL 12 14   CREATININE mg/dL 0.75 0.70   GLUCOSE mg/dL 171* 81   Estimated Creatinine Clearance: 99.5 mL/min (by C-G formula based on SCr of 0.75 mg/dL).  Results from last 7 days   Lab Units 05/18/22  0339 05/16/22  2353   ALBUMIN g/dL 3.50 4.40   BILIRUBIN mg/dL 0.4 0.4   ALK PHOS U/L 64 75   AST (SGOT) U/L 17 37*   ALT (SGPT) U/L 13 19     Results from last 7 days   Lab Units 05/18/22  0339 05/16/22  2353   CALCIUM mg/dL 7.6* 9.3   ALBUMIN g/dL 3.50 4.40   MAGNESIUM mg/dL  --  2.3       Glucose   Date/Time Value Ref Range Status   05/18/2022 1112 88 70 - 130 mg/dL Final     Comment:     Meter: [id number] : [id number] Penny Chichialex SUAREZ   05/18/2022 0543 91 70 - 130 mg/dL Final     Comment:     Meter: [id number] : [id number] Carol SUAREZ   05/17/2022 2327 117 70 - 130 mg/dL Final     Comment:     Meter: [id number] : [id number] Carol SUAREZ   05/17/2022 1941 115 70 - 130 mg/dL Final     Comment:     Meter: [id number] : [id number] Carol SUAREZ   05/17/2022 1134 102 70 - 130 mg/dL Final     Comment:     Meter: [id number] : [id number] Rohan SUAREZ   05/17/2022 0357 112 70 - 130 mg/dL Final     Comment:     Meter: [id number] : [id number] Iggy Bhandari RN   05/17/2022 0336 57 (L) 70 - 130 mg/dL Final     Comment:     Meter: [id number] : [id number] Carol SUAREZ       levETIRAcetam, 500 mg, Oral, Nightly  levETIRAcetam, 750 mg, Oral, Q AM  LORazepam, 1 mg, Oral, Q8H  sodium chloride, 10 mL, Intravenous, Q12H  [START ON 5/19/2022] thiamine, 100 mg, Oral, Once       Diet Regular       Assessment/Plan     Active Hospital Problems    Diagnosis  POA   • **Intentional overdose, initial encounter ([facility]) [T50.902A]  Yes   • Closed odontoid fracture with type I morphology ([facility]) [S12.100A]  Yes   •  Alcohol abuse [F10.10]  Yes   • Suicide attempt ([facility]) [T14.91XA]  Yes   • VSD (ventricular septal defect) [Q21.0]  Not Applicable   • Coarctation of aorta [Q25.1]  Not Applicable   • Essential hypertension [I10]  Yes   • History of seizure [Z87.898]  Yes      Resolved Hospital Problems   No resolved problems to display.     Hospital course to date:  Mrs. Guardado is a 42 year old female who presented to the hospital with intentional overdose and suicidal ideation. She apparently drank 1/2 pint of vodka as well as ingested an unknown amount of Ambien. She fell at home and  found her and brought her in.              In the ED, CT cervical spine showed concerns for odontoid fracture and LORY was contacted. She was placed in a c-collar and moved to the ICU for monitoring.               She has a known history of coarctation and VSD that were repaired as an infant and had some EKG changes prompting cardiology evaluation.               She also has a history of seizures on Keppra and gabapentin. Her gabapentin was held d/t her self harm. LORY as well as Cardiology evaluated and she was cleared to move out of the ICU. Psychiatry was consulted d/t her suicide attempt.     · Intentional overdose/suicide attempt: Monitored overnight in ICU. Pulmonology admitted. Psychiatry following and she remains in suicide precautions. Dr. Guaman will not release SI precautions until he speaks with her . Patient interested in outpatient therapies with plans to meet chemical dependency SW tomorrow at 0855.  · Alcohol abuse: CIWA protocol in place. Treat with Ativan as needed. Access following. Vitamin replacment.  · Closed odontoid fracture: LORY following. MRI back with no fracture, but bone spur. C-collar removed and LORY signed off.  · VSD/coarctation of aorta repaired as infant: EKG changes on admission that resolved the next morning. Cardiology has seen. No further work up and follow up outpatient as scheduled.  · HTN: BP  stable/intermittently elevated from withdrawal. Monitor trends.  · History of seizure: Keppra resumed. Seizure precautions. Holding gabapentin for her self-harm issues.     Discussed with patient, family and nursing staff.    Patient cleared to go home once suicide precautions lifted by Dr. Guaman. He apparently has been unable to reach the patient's . I informed the patient of this. We will plan to discharge home once Dr. Guaman evaluates again.        VTE Prophylaxis - SCDs  Code Status - Full code  Disposition - Anticipate discharge as above.      LESLYE Magana  [facility]  05/18/22  14:44 EDT

## 2022-05-18 NOTE — PLAN OF CARE
Problem: Adult Inpatient Plan of Care  Goal: Plan of Care Review  Outcome: Ongoing, Progressing  Flowsheets (Taken 5/18/2022 1910)  Progress: improving  Plan of Care Reviewed With: patient  Outcome Evaluation: Pt transferred from ICU to [facility]. Sitter remains at bedside. Gaitan removed, +Voiding. IVF discontinued. Eating and drinking. MRI Done. No need for C Collar at this time. Mother and sisters visited. Will continue to monitor.

## 2022-05-18 NOTE — PROGRESS NOTES
Reviewed chart and interviewed pt. Pt asked that her mother and sisters remain in the room while we spoke. Educated pt on co-occurring disorders.  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 Educated  Pt and the disease of JAZ. Pt smiling and engaged in conversation. If Dr. Deniz GREENE releases pt after speaking with pt  discussed importance of starting JAZ IOP ASAP.  Motivational interviewing. Pt would like to start JAZ IOP co-occurring program tomorrow. This worker offered pt transportation if she needs . Pt declined,  I will meet pt tomorrow at 0855 at the main entrance of [facility].  Informed nursing staff who will let CCP and pt RN know of d/c plan.

## 2022-05-19 ENCOUNTER — TRANSITIONAL CARE MANAGEMENT TELEPHONE ENCOUNTER (OUTPATIENT)
Dept: CALL CENTER | Facility: HOSPITAL | Age: 42
End: 2022-05-19

## 2022-05-19 ENCOUNTER — OFFICE VISIT (OUTPATIENT)
Dept: PSYCHIATRY | Facility: HOSPITAL | Age: 42
End: 2022-05-19

## 2022-05-19 DIAGNOSIS — F10.20 UNCOMPLICATED ALCOHOL DEPENDENCE: Primary | ICD-10-CM

## 2022-05-19 NOTE — PLAN OF CARE
Goal Outcome Evaluation:   D/C order received. Pt will go home with family and family members at bedside.

## 2022-05-19 NOTE — PROGRESS NOTES
Teaching Record:  Information / activity:  Planning for Sobriety and Heroin and Opiates Education    Good participation   [phone]      JONATHAN LariosW

## 2022-05-19 NOTE — OUTREACH NOTE
Prep Survey    Flowsheet Row Responses   [facility] patient discharged from? [place]   Is LACE score < 7 ? Yes   Emergency Room discharge w/ pulse ox? No   Eligibility [facility]   Date of Admission 05/16/22   Date of Discharge 05/18/22   Discharge Disposition Home or Self Care   Discharge diagnosis Intentional overdose   Does the patient have one of the following disease processes/diagnoses(primary or secondary)? Other   Does the patient have Home health ordered? No   Is there a DME ordered? No   Prep survey completed? Yes          JANNIE RATLIFF - Registered Nurse

## 2022-05-19 NOTE — PROGRESS NOTES
"CD IOP Group note  Observations:    Engaged in Activity / Process and Self -disclosed: Yes  Applies Topic to self: Yes  Able to give Constructive Feedback: Yes  Affect: anxious  Degree of Insightful Thinking 5  Notes:  [id number]  Pt processed sadness tearfully processing the trauma and high stressors along with drinking ETOH that culminated in her suicide attempt. Pt processed conflict in her marriage as her  views drinking ETOH a sin and that pt chooses to drink over having good relationship with him. Pt processed her  yells at her and gets loud. Pt denies he has ever abused her physically or sexually. 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 I drank when I got home not to feel and then began hiding my bottles. Pt has \"no\" SI.  Pt assisted with formulation of and processing a chronological of her drinking. Subsequently, pt able to verbalize an understanding of the progression of her JAZ. Pt stated she was grateful to be in IOP here.    Vanessa Espinal LCSW              "

## 2022-05-19 NOTE — TREATMENT PLAN
Multi-Disciplinary Problems (from Behavioral Health Treatment Plan)    Active Problems     Problem: Sexual Dysfunction  Start Date: 05/19/22    Problem Details: The patient self-scales this problem as a 10 with 10 being the worst.        Goal Priority Start Date Expected End Date End Date    Patient will incorporate positive and healthy sexual attitudes and behaviors. -- 05/19/22 -- --    Goal Details: Progress toward goal:  Not appropriate to rate progress toward goal since this is the initial treatment plan.        Goal Intervention Frequency Start Date End Date    Assess sexual history Weekly 05/19/22 --    Intervention Details: Duration of treatment until until discharged.        Goal Intervention Frequency Start Date End Date    Educate about healthy sexual behavior and attitudes. Weekly 05/19/22 --    Intervention Details: Duration of treatment until until discharged.                           I have discussed and reviewed this treatment plan with the patient.  It has been printed for signatures.

## 2022-05-19 NOTE — OUTREACH NOTE
Call Center TCM Note    Flowsheet Row Responses   [facility] patient discharged from? [place]   Does the patient have one of the following disease processes/diagnoses(primary or secondary)? Other   TCM attempt successful? No   Unsuccessful attempts Attempt 2          Sana Dumont MA    5/19/2022, 16:39 EDT

## 2022-05-19 NOTE — PROGRESS NOTES
"CD IOP Group note  Observations:    Engaged in Activity / Process and Self -disclosed: Yes  Applies Topic to self: Yes  Able to give Constructive Feedback: Yes  Affect: anxious  Degree of Insightful Thinking 5  Notes:  [phone]  Assisted pt with formulation of and processing her weekend sobriety plan. Pt states she has \"no\" SI or cravings for ETOH. Educated pt that withdrawal symptoms can occur up to 2 weeks after last drink. Pt last drink was Monday 5-23-22. Educated that withdrawal from ETOH can be life threatening reviewed signs and symptoms of acute withdrawal. Pt able to teach back and agreed to call 911 if any SI or withdrawal symptoms occur. Pt given the 24X7 number of access center as well.   Assisted pt with formulation of weekend sobriety plan. Pt stated she never takes off work and has huge amount of PTO. Encouraged pt to consider FMLA during this acute phase of treatment.       JONATHAN LariosW            "

## 2022-05-19 NOTE — OUTREACH NOTE
Call Center TCM Note    Flowsheet Row Responses   [facility] patient discharged from? [place]   Does the patient have one of the following disease processes/diagnoses(primary or secondary)? Other   TCM attempt successful? No   Unsuccessful attempts Attempt 1          Sana Dumont MA    5/19/2022, 15:25 EDT

## 2022-05-19 NOTE — DISCHARGE SUMMARY
[facility]    Date of Discharge:  5/18/2022    PCP: Usama Babin MD    Discharge Diagnosis:   Active Hospital Problems    Diagnosis  POA   • **Intentional overdose, initial encounter ([facility]) [T50.902A]  Yes   • Alcohol abuse [F10.10]  Yes   • Suicide attempt ([facility]) [T14.91XA]  Yes   • VSD (ventricular septal defect) [Q21.0]  Not Applicable   • Coarctation of aorta [Q25.1]  Not Applicable   • Essential hypertension [I10]  Yes   • History of seizure [Z87.898]  Yes      Resolved Hospital Problems   No resolved problems to display.          Consults     Date and Time Order Name Status Description    5/17/2022 10:56 AM Inpatient Hospitalist Consult Completed     5/17/2022  3:15 AM Inpatient Neurosurgery Consult Completed     5/17/2022  3:11 AM Inpatient Cardiology Consult Completed     5/17/2022  3:11 AM Inpatient Psychiatrist Consult Completed     5/17/2022  2:18 AM Pulmonology (on-call MD unless specified)          Hospital Course  42 y.o. female who presented to the hospital with intentional overdose and suicidal ideation. She apparently drank 1/2 pint of vodka as well as ingested an unknown amount of Ambien. She fell at home and  found her and brought her in.    In the ED, CT cervical spine showed concerns for odontoid fracture and LORY was contacted. She was placed in a c-collar and moved to the ICU for monitoring.     She has a known history of coarctation and VSD that were repaired as an infant and had some EKG changes prompting cardiology evaluation.     She also has a history of seizures on Keppra and gabapentin. Her gabapentin was held d/t her self harm. LORY as well as Cardiology evaluated and she was cleared to move out of the ICU. Psychiatry was consulted d/t her suicide attempt.     · Intentional overdose/suicide attempt: Monitored overnight in ICU. Pulmonology admitted. Psychiatry following and patient's  Jase is in agreement with  Access plan and can safely care for patient. Patient interested in outpatient therapies with plans to start IOP tomorrow.  · Alcohol abuse: CIWA score most recently 0.  · Closed odontoid fracture: LORY following. MRI back with no fracture, but bone spur. C-collar removed and LORY signed off.  · VSD/coarctation of aorta repaired as infant: EKG changes on admission that resolved the next morning. Cardiology has seen. No further work up and follow up outpatient as scheduled.  · HTN: BP stable/intermittently elevated from withdrawal.  · History of seizure: Keppra resumed. Seizure precautions. Holding gabapentin for her self-harm issues.    I discussed the patient's findings and my recommendations with patient and nursing staff.    Condition on Discharge: Improved/stable.    Temp:  [97.6 °F (36.4 °C)-98.6 °F (37 °C)] 98.2 °F (36.8 °C)  Heart Rate:  [64-85] 79  Resp:  [14-18] 18  BP: (123-176)/() 160/94  Body mass index is 23.66 kg/m².    Physical Exam   Constitutional     General: Patient is not in acute distress.     Appearance: Normal appearance. Not toxic-appearing.   HENT     Head: Normocephalic and atraumatic.   Cardiovascular     Rate and Rhythm: Normal rate and regular rhythm.   Pulmonary     Effort: Pulmonary effort is normal. No respiratory distress.      Breath sounds: Normal   breath sounds  . No wheezing or rhonchi.   Abdominal     General: Bowel sounds are normal. There is no distension.      Palpations: Abdomen is soft.      Tenderness: There is no abdominal tenderness. There is no guarding or rebound.    Musculoskeletal     General: No swelling.      Right lower leg: No edema.      Left lower leg: No edema.    Skin     General: Skin is warm and dry.   Neurological     General: No focal deficit present.      Mental Status: Alert and oriented to person, place, and time.   Psychiatric        Mood and Affect: Mood normal.         Behavior: Behavior normal.      Disposition: Home or Self Care        Discharge Medications      Continue These Medications      Instructions Start Date   fexofenadine 30 MG tablet  Commonly known as: ALLEGRA   30 mg, Oral, 2 Times Daily      folic acid 1 MG tablet  Commonly known as: FOLVITE   1 mg, Oral, Daily      levETIRAcetam 500 MG tablet  Commonly known as: KEPPRA   TAKE 1 1/2 TABLETS BY MOUTH EVERY MORNING AND 1 AT NIGHT      MULTIPLE MINERALS-VITAMINS PO   1 tablet, Oral      Tri-Sprintec 0.18/0.215/0.25 MG-35 MCG per tablet  Generic drug: norgestimate-ethinyl estradiol   TK 1 T PO  QD CONTINOUSLY. SKIP PLACEBO AND GO TO THE NEXT PACK      Xulane 150-35 MCG/24HR  Generic drug: norelgestromin-ethinyl estradiol   BREANA 1 PA EXT TO THE SKIN 1 TIME WEEKLY         Stop These Medications    gabapentin 600 MG tablet  Commonly known as: NEURONTIN     zolpidem 10 MG tablet  Commonly known as: AMBIEN           Diet Instructions     Diet: Regular      Discharge Diet: Regular         Activity Instructions     Activity as Tolerated           Additional Instructions for the Follow-ups that You Need to Schedule     Call MD for problems / concerns.   As directed         Follow-up Information     Usama Babin MD Follow up in 1 week(s).    Specialties: Family Medicine, Emergency Medicine  Contact information:  [address] [place] [address]  [phone]             [facility] Follow up in 1 day(s).                         Kd Fraire MD  05/18/22  20:13 EDT    Discharge time spent greater than 30 minutes.

## 2022-05-20 ENCOUNTER — TRANSITIONAL CARE MANAGEMENT TELEPHONE ENCOUNTER (OUTPATIENT)
Dept: CALL CENTER | Facility: HOSPITAL | Age: 42
End: 2022-05-20

## 2022-05-20 NOTE — OUTREACH NOTE
Call Center TCM Note    Flowsheet Row Responses   [facility] patient discharged from? [place]   Does the patient have one of the following disease processes/diagnoses(primary or secondary)? Other   TCM attempt successful? No   Unsuccessful attempts Attempt 3  [Verbal release out of date ]          Macey Penn RN    5/20/2022, 09:11 EDT

## 2022-05-23 ENCOUNTER — OFFICE VISIT (OUTPATIENT)
Dept: PSYCHIATRY | Facility: HOSPITAL | Age: 42
End: 2022-05-23

## 2022-05-23 DIAGNOSIS — F10.20 UNCOMPLICATED ALCOHOL DEPENDENCE: Primary | ICD-10-CM
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
## 2022-05-24 ENCOUNTER — OFFICE VISIT (OUTPATIENT)
Dept: PSYCHIATRY | Facility: HOSPITAL | Age: 42
End: 2022-05-24

## 2022-05-24 DIAGNOSIS — F10.20 UNCOMPLICATED ALCOHOL DEPENDENCE: Primary | ICD-10-CM

## 2022-05-24 NOTE — PROGRESS NOTES
"Addendum group note [id number]  Assisted pt with formualtion of and processing her sobriety plan for today. Pt has \"no\" Si      "

## 2022-05-24 NOTE — PROGRESS NOTES
CD IOP Group note  Observations:    Engaged in Activity / Process and Self -disclosed: Yes  Applies Topic to self: Yes  Able to give Constructive Feedback: Yes  Affect: anxious  Degree of Insightful Thinking 6  Notes:  8403-0260  Pt participated in closure activities for peer finishing [facility] IOP today. Pt offered peer encouragement.   JONATHAN LariosW

## 2022-05-24 NOTE — PROGRESS NOTES
"CD IOP Group note  Observations:    Engaged in Activity / Process and Self -disclosed: Yes  Applies Topic to self: Yes  Able to give Constructive Feedback: Yes  Affect: anxious  Degree of Insightful Thinking 5  Notes:  [phone]  Pt processed anxiety and sadness. Pt has decided to work during the afternoon's of [facility] IOP days. Extended offer and recommendation she continue to consider FMLA.  Pt has \"no\" STACIE Espinal LCSW            "

## 2022-05-24 NOTE — PROGRESS NOTES
"Teaching Record:  Information / activity:  Expressive Therapy  [id number] Welcome picture collage of \"My Losses in Recovery\" and \"My Gains in Recovery\"; processed with the group    Good participation  Pt focused on the task and shared of the losses in starting recovery and hopes in gaining positives in recovery.       Related to peers and accepted positive peer feedback.      Corey Feldman LPAT            "

## 2022-05-25 ENCOUNTER — TELEPHONE (OUTPATIENT)
Dept: PSYCHIATRY | Facility: HOSPITAL | Age: 42
End: 2022-05-25

## 2022-05-25 ENCOUNTER — OFFICE VISIT (OUTPATIENT)
Dept: PSYCHIATRY | Facility: HOSPITAL | Age: 42
End: 2022-05-25

## 2022-05-25 DIAGNOSIS — F10.20 UNCOMPLICATED ALCOHOL DEPENDENCE: Primary | ICD-10-CM

## 2022-05-25 DIAGNOSIS — F33.1 MAJOR DEPRESSIVE DISORDER, RECURRENT EPISODE, MODERATE: ICD-10-CM

## 2022-05-25 NOTE — PROGRESS NOTES
Teaching Record:  Information / activity:  Overview of the 12 Steps    Good participation   Depression as brain disease and [facility] too  [phone]      JONATHAN LariosW

## 2022-05-25 NOTE — PROGRESS NOTES
"CD IOP Group note  Observations:   [id number]    Engaged in Activity / Process and Self -disclosed: Yes  Applies Topic to self: Yes  Able to give Constructive Feedback: Yes  Affect: anxious  Degree of Insightful Thinking 5  Notes:  Pt processed diagnosis of conversion disorder and pseudo seizures as the medical community can not find any neurological or otherwise physical cause. Educated pt on what conversion disorder is and pseudo seizures too. \"Oh now I understand\" Pt encouraging to peer in group. Pt has \"no\" ISABEL Espinal LCSW            "

## 2022-05-25 NOTE — PROGRESS NOTES
"CD IOP Group note  Observations:    Engaged in Activity / Process and Self -disclosed: Yes  Applies Topic to self: Yes  Able to give Constructive Feedback: Yes  Affect: anxious  Degree of Insightful Thinking 5  Notes:  8856-0211  Assisted pt with formualtion and processing sobriety plan for today. Pt has \"no\" SI.      Vanessa Espinal, JONATHANW            "

## 2022-05-25 NOTE — TELEPHONE ENCOUNTER
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  Informed pt I am glad to write letter verifying that pt is comming to treatment 4 days per week but will defer to Dr. Deniz GREENE who can see pt tomorrow about fitness for work.  Pt agreed  However, pt son graduating school tomorrow morning and pt will have to be late for IOP. Informed pt that Dr. Dneiz GREENE comes between 10 and 11am usually baring any emergencies he may have with pts.
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
## 2022-05-26 ENCOUNTER — OFFICE VISIT (OUTPATIENT)
Dept: PSYCHIATRY | Facility: HOSPITAL | Age: 42
End: 2022-05-26

## 2022-05-26 ENCOUNTER — TELEPHONE (OUTPATIENT)
Dept: INTERNAL MEDICINE | Facility: CLINIC | Age: 42
End: 2022-05-26

## 2022-05-26 DIAGNOSIS — F10.20 UNCOMPLICATED ALCOHOL DEPENDENCE: Primary | ICD-10-CM

## 2022-05-26 NOTE — PROGRESS NOTES
"[id number] CD IOP Group note  Observations:    Engaged in Activity / Process and Self -disclosed: Yes  Applies Topic to self: Yes  Able to give Constructive Feedback: Yes  Affect: anxious  Degree of Insightful Thinking 5  Notes:  Assisted pt with formulation of and processing holiday weekend sobriety plan. Pt knows that IOP will not meet on Monday and make up session offered next Friday. Pt will see if her  will be able to take children so she can come to make up session as kids out of school. Pt has \"no\" SI.      Vanessa Espinal, JONATHANW            "

## 2022-05-26 NOTE — PROGRESS NOTES
Teaching Record:  Information / activity:  Planning for Sobriety and Stress Management in Recovery    Good participation  [phone]      Vanessa Espinal, JONATHANW

## 2022-05-26 NOTE — PROGRESS NOTES
"CD IOP Group note  Observations:    Engaged in Activity / Process and Self -disclosed: Yes  Applies Topic to self: Yes  Able to give Constructive Feedback: Yes  Affect: anxious  Degree of Insightful Thinking 6  Notes:  [id number]  Pt processed her pride that she was able to go to her son's 8th grade graduation this morning before group. \"He got an award for all A's in the advanced program\" \"That is quite an accomplishment\" \"My  went too and took him home after grad celebration. Pt processed anxiety and inability to sleep since stopping Ambien. Pt advised to make list to ask Dr. Deniz GREENE about today when she see's him. Pt has \"no\" ISABEL Espinal, LCSW            "

## 2022-05-26 NOTE — PROGRESS NOTES
"1:1 with pt  Discussed with pt that I had asked Dr. Deniz GREENE today (subsequent to him seeing pt) if pt could go back to work as pt requested. Dr. Deniz GREENE told me that with initiation of medications prescribed today he would like to see her next week before giving ok to return to work. Pt anxious because \"My boss is such a nasty person he talks bad about anyone who takes time off for any reason\"   I has co-worker who took off to go on her honey moon and she had the hours of PTO to take off and he still talked about her like she was a dog while she was gone\"   Advised to speak to HR about Dr. Deniz GREENE denial of pt request to return to work at this time. Pt shared that she had been working this week after IOP. \"My boss told me yesterday that he had heard back from HR and they said they needed letter stating I was in IOP and could return back to work.\" As pt had called this therapist about yesterday. Pt concerned that no one else in office can do her job. Reassured pt that her employer [employer] has vast resources to over for employees that are unable to work under doctors supervision. Discussed with pt recommendation she get [place] paper work for Dr. Deniz GREENE to sign. Pt stated she had 150 hours of PTO and could take that time to be paid.   Edcuated pt that Lexapro was mood stabilizer, Vistaril for anxiety, and Trazodone was a mild antidepressant that helped with sleep as pt complaining of not being able to sleep since she was taken off Ambien.  Educated pt the difference between psychoactive substances and psychotropic medications.    Educated pt none of medications prescribed today by  are controled substances. Any medication can produce side effects and Dr. Guaman said he wants to see U next week to see how pt progressing upon initiation of 3 psychotropic new medications.  Reviewed safety plan for pt. If she has any thoughts of harming herself she will call (11 and go to ER.   Pt states she " "has \"no\" SI.  "

## 2022-05-31 ENCOUNTER — OFFICE VISIT (OUTPATIENT)
Dept: PSYCHIATRY | Facility: HOSPITAL | Age: 42
End: 2022-05-31

## 2022-05-31 DIAGNOSIS — F33.0 MILD EPISODE OF RECURRENT MAJOR DEPRESSIVE DISORDER: ICD-10-CM

## 2022-05-31 DIAGNOSIS — F10.20 UNCOMPLICATED ALCOHOL DEPENDENCE: Primary | ICD-10-CM

## 2022-05-31 DIAGNOSIS — F33.1 MAJOR DEPRESSIVE DISORDER, RECURRENT EPISODE, MODERATE: ICD-10-CM

## 2022-05-31 NOTE — PROGRESS NOTES
"[id number]     IOP Group note  Observations:    Engaged in Activity / Process and Self -disclosed: Yes  Applies Topic to self: Yes  Able to give Constructive Feedback: Yes  Affect: anxious and bright  Degree of Insightful Thinking 6  Notes:  Pt assisted with processing her sobriety plan for today. Pt has \"no\" SI.      Vanessa Espinal LCSW            "

## 2022-05-31 NOTE — PROGRESS NOTES
Teaching Record:  Information / activity:  Expressive Therapy  3825-7140 Art Therapy - Used colored paper to create a mosaic of the embodiment of addiction in the form of a creature, name the creature and then write messages that the creature wants pt to believe; then processed with the group.    Good participation  Pt focused on the task, related to peer and accepted positive feedback. Pt created image of snake sneaking in the grass. Pt discussed with peer the discovery that able to have fun without using substances. Pt was encouraged to keep the image and look at it to remember the nature of the illness.       Corey Feldman LPAT

## 2022-05-31 NOTE — PROGRESS NOTES
S: The patient is tolerated initiation of Lexapro, Vistaril and trazodone without complaint.  She is in good spirits and maintained sobriety throughout the weekend.    O: Awake alert and oriented all spheres.  Mood euthymic affect congruent.  Speech relevant and coherent.  No suicidal or homicidal ideation or psychotic features.    A: Alcohol use disorder, major depressive disorder recurrent moderate    P: Continuing current treatment

## 2022-05-31 NOTE — PROGRESS NOTES
"CD IOP Group note  Observations:    Engaged in Activity / Process and Self -disclosed: Yes  Applies Topic to self: Yes  Able to give Constructive Feedback: Yes  Affect: anxious  Degree of Insightful Thinking 6  Notes:  4827-0372  Pt processed feeling anxious. Pt processed she was happy this weekend not working, and had fun. \"I went [place] with my sister, her  and mine\" \"I was wondering if I would have fun with out drinking\" \"It was so fun all weekend\" Pt sister visited over the weekend. Pt has \"no\" SI.      Vanessa Espinal, LCSW            "

## 2022-06-01 ENCOUNTER — OFFICE VISIT (OUTPATIENT)
Dept: PSYCHIATRY | Facility: HOSPITAL | Age: 42
End: 2022-06-01

## 2022-06-01 DIAGNOSIS — F10.20 UNCOMPLICATED ALCOHOL DEPENDENCE: Primary | ICD-10-CM
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
## 2022-06-01 NOTE — PROGRESS NOTES
"CD IOP Group note  Observations:    Engaged in Activity / Process and Self -disclosed: Yes  Applies Topic to self: Yes  Able to give Constructive Feedback: Yes  Affect: anxious  Degree of Insightful Thinking 5  Notes:  0803-2544  Assisted pt with formaulation and processing sobriety plan for today. Pt has \"no\" SI.      Vanessa Espinal, JONATHANW            "
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
## 2022-06-01 NOTE — PROGRESS NOTES
Teaching Record:  Information / activity:  Families in Recovery - Family Program    Good participation  8024-2425      Vanessa Espinal, LCSW

## 2022-06-02 ENCOUNTER — OFFICE VISIT (OUTPATIENT)
Dept: PSYCHIATRY | Facility: HOSPITAL | Age: 42
End: 2022-06-02

## 2022-06-02 DIAGNOSIS — F10.20 UNCOMPLICATED ALCOHOL DEPENDENCE: Primary | ICD-10-CM

## 2022-06-02 NOTE — PROGRESS NOTES
CD IOP Group note  Observations:    Engaged in Activity / Process and Self -disclosed: Yes  Applies Topic to self: Yes  Able to give Constructive Feedback: Yes  Affect: anxious  Degree of Insightful Thinking 5  Notes:  9722-3730  Pt processed anxiety and gratitude. Pt anxious that her  is still trying to fix manage and control pt. However, pt beginning to set some appropriate boundaries using assertiveness training she has learned from [facility]. Pt      JONATHAN LariosW

## 2022-06-02 NOTE — PROGRESS NOTES
Teaching Record:  Information / activity:  Overview of the 12 Steps and Marijuana Education    Good participation      [id number]      Vanessa Espinal, LCSW

## 2022-06-02 NOTE — PROGRESS NOTES
"CD IOP Group note  Observations:    Engaged in Activity / Process and Self -disclosed: Yes  Applies Topic to self: Yes  Able to give Constructive Feedback: Yes  Affect: anxious  Degree of Insightful Thinking 6  Notes:  8999-8488  Assisted pt with formulation of and processing her weekend sobriety plan. Pt has \"no\" SI.      Vanessa Espinal, JONATHANW            "

## 2022-06-04 DIAGNOSIS — F51.01 PRIMARY INSOMNIA: Primary | ICD-10-CM

## 2022-06-06 ENCOUNTER — OFFICE VISIT (OUTPATIENT)
Dept: PSYCHIATRY | Facility: HOSPITAL | Age: 42
End: 2022-06-06

## 2022-06-06 DIAGNOSIS — F10.20 UNCOMPLICATED ALCOHOL DEPENDENCE: Primary | ICD-10-CM

## 2022-06-06 RX ORDER — ZOLPIDEM TARTRATE 10 MG/1
TABLET ORAL
Qty: 30 TABLET | Refills: 5 | Status: SHIPPED | OUTPATIENT
Start: 2022-06-06 | End: 2022-11-09

## 2022-06-06 NOTE — PROGRESS NOTES
"CD IOP Group note  Observations:    Engaged in Activity / Process and Self -disclosed: Yes  Applies Topic to self: Yes  Able to give Constructive Feedback: Yes  Affect: anxious and bright  Degree of Insightful Thinking 6  Notes:  7709-3652  Pt processed feeling relaxed over the weekend. Pt hiked and swam with her family in town and her children. Pt  worked all weekend. \"My  and I have not talked as we basically pass each other as he is going to work or I am going out with family and or my children\" Pt has \"no\" SI. Practiced assertiveness skills and practiced with pt to help with setting healthy boundaries with her mother.      Vanessa Espinal, JONATHANW            "

## 2022-06-06 NOTE — PROGRESS NOTES
Teaching Record:  Information / activity:  Triggers and Cravings - Family Program    Good participation   6500-5815      Vanessa Espinal, LCSW

## 2022-06-06 NOTE — PROGRESS NOTES
"CD IOP Group note  Observations:    Engaged in Activity / Process and Self -disclosed: Yes  Applies Topic to self: Yes  Able to give Constructive Feedback: Yes  Affect: anxious and bright  Degree of Insightful Thinking 6  Notes:  3244-7622  Assisted pt with formulation of and processing her sobriety plan for today. Pt will be driving her son to camp after group. \"I never get to do take him because I am always working so I will today\" Pt has \"\"no\" STACIE.      Vanessa Espinal, [facility]            "

## 2022-06-07 ENCOUNTER — OFFICE VISIT (OUTPATIENT)
Dept: PSYCHIATRY | Facility: HOSPITAL | Age: 42
End: 2022-06-07

## 2022-06-07 DIAGNOSIS — F33.1 MAJOR DEPRESSIVE DISORDER, RECURRENT EPISODE, MODERATE: ICD-10-CM

## 2022-06-07 DIAGNOSIS — F10.20 UNCOMPLICATED ALCOHOL DEPENDENCE: Primary | ICD-10-CM

## 2022-06-07 NOTE — PROGRESS NOTES
"CD IOP Group note  Observations:    Engaged in Activity / Process and Self -disclosed: Yes  Applies Topic to self: Yes  Able to give Constructive Feedback: Yes  Affect: anxious  Degree of Insightful Thinking 6  Notes:  7993-2717  Pt processed anxiety about going back to work. Dr. Deniz GREENE signed her return to work today. Pt has [employer] disability DA paperwork completed today. Pt going to work in PM for the amount of time she will attend IOP. Pt has \"no SI  Assisted pt with formulation of and processing her sobriety plan for today.      Vanessa Espinal, ANTHONY            "

## 2022-06-07 NOTE — PROGRESS NOTES
S: The patient continues to do well and hopes to return to work in the near future.  She offers no other complaints when seen today.    O: Awake alert and oriented all spheres.  Mood euthymic affect congruent.  Speech relevant and coherent.  No suicidal or homicidal ideation regarding features.  Judgement and insight intact.    A: Depressive disorder single episode moderate    P: Continuing current treatment in anticipation of end of week discharge

## 2022-06-07 NOTE — PROGRESS NOTES
Teaching Record:  Information / activity:  Expressive Therapy  9280-9877 Art Therapy- Read the therapeutic story The Wall, created an image of own wall and discussed in the group    Good participation  Pt focused on the task, shared image and discussed own image of the wall. Described how using was isolating self from children       and that now able to enjoy interacting and focusing on the children.      Corey Feldman LPAT

## 2022-06-07 NOTE — PROGRESS NOTES
"CD IOP Group note  Observations:    Engaged in Activity / Process and Self -disclosed: Yes  Applies Topic to self: Yes  Able to give Constructive Feedback: Yes  Affect: anxious  Degree of Insightful Thinking 5  Notes:  3235-3848  Pt processed anxiety as she did not have a paycheck or PTO pay from time off while she ws in the hospital or [facility]. Pt also has short term disability too. Pt thought that it would automatically be paid. Educated pt that she would have to make a claim other wise how would the short term disability group know she was off. Pt will contact (pt thinks its [employer])  Informed pt she can get the info from HR. Pt has \"no\" STACIE.      Vanessa Espinal, LCSW            "

## 2022-06-08 ENCOUNTER — TELEPHONE (OUTPATIENT)
Dept: INTERNAL MEDICINE | Facility: CLINIC | Age: 42
End: 2022-06-08

## 2022-06-08 ENCOUNTER — OFFICE VISIT (OUTPATIENT)
Dept: PSYCHIATRY | Facility: HOSPITAL | Age: 42
End: 2022-06-08

## 2022-06-08 DIAGNOSIS — F10.20 UNCOMPLICATED ALCOHOL DEPENDENCE: Primary | ICD-10-CM

## 2022-06-08 NOTE — TELEPHONE ENCOUNTER
Caller: Sharron Guardado    Relationship to patient: Self    Best call back number: [phone]    Patient is needing: PATIENT IS CHECKING ON A FORM THAT WAS FAXED THIS MORNING. ASKING THAT DR DEA NDA FILL OUT FORM ALLOWING PATIENT TO KEEP HER DRIVERS LICENSE (PATIENT HAS SEIZURES).  PATIENT STATES THE FORM IS DUE TO BE TURNED IN TO [place] [employer] OR SHE WILL LOSE HER DRIVERS LICENSE.   PLEASE ADVISE

## 2022-06-08 NOTE — PROGRESS NOTES
Teaching Record:  Information / activity:  Relapse Prevention Workbook and Process Addictions    Good participation  [id number]      Vanessa Espinal, LCSW

## 2022-06-08 NOTE — PLAN OF CARE
"Pt has attended 11 [facility] IOP sessions. Pt has AA sponsor and attending AA.  Pt setting appropriate boundaries with her  regarding his deep political opinions and intolerance of any other opinions. Pt  hold value that a woman should be subservient to her  which pt does not agree with. Pt using assertive techniques to set healthy boundaries. Pt was released 6-7-22 to go back to work by Dr. Deniz GREENE. Pt \"having fun with my side of the family and my children\" \"I have enjoyed cook outs, kyacking and hiking with my sisters and cousin. Pt has developed relapse prevention plan assisted with by this therapist. Pt has \"no\" SI.  "

## 2022-06-08 NOTE — PROGRESS NOTES
"CD IOP Group note  Observations:    Engaged in Activity / Process and Self -disclosed: Yes  Applies Topic to self: Yes  Able to give Constructive Feedback: Yes  Affect: anxious  Degree of Insightful Thinking 5  Notes:  8137-2276  Pt processed gratitude for IOP as she has been able to set appropriate boundaries with her  that she never thought possible. Pt processed anxiety and setting some healthy boundaries at work. \"I am going to delegate\" Encouraged pt and supported her growth and continued abstinence. Pt has \"no\" SIFrancia Espinal LCSW            "

## 2022-06-09 ENCOUNTER — OFFICE VISIT (OUTPATIENT)
Dept: PSYCHIATRY | Facility: HOSPITAL | Age: 42
End: 2022-06-09

## 2022-06-09 DIAGNOSIS — F10.20 UNCOMPLICATED ALCOHOL DEPENDENCE: Primary | ICD-10-CM

## 2022-06-09 NOTE — PROGRESS NOTES
S: The patient continues to do well and is maintaining sobriety.  She offers no new complaints when seen today.    O: Wake alert and oriented in all spheres.  Mood euthymic affect congruent.  Speech relevant and coherent.  No suicidal or homicidal ideation psychotic features.  Judgement and insight intact.    A: Alcohol use disorder    P: The patient plans to continue programming until the end of June.  She states that she is benefiting greatly from her participation in programming and is extremely complimentary towards Ms. Espinal and staff.

## 2022-06-09 NOTE — PROGRESS NOTES
Teaching Record:  Information / activity:  Stages of Family Recovery - Family Program    Good participation   [id number]      Vanessa Espinal, LCSW
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
## 2022-06-09 NOTE — TELEPHONE ENCOUNTER
Left patient a message asking which fax number paperwork was sent to as I am not able to locate it. The best fax number is [phone]. Asking patient to refax to this number if not already.

## 2022-06-13 ENCOUNTER — OFFICE VISIT (OUTPATIENT)
Dept: PSYCHIATRY | Facility: HOSPITAL | Age: 42
End: 2022-06-13

## 2022-06-13 DIAGNOSIS — F10.20 UNCOMPLICATED ALCOHOL DEPENDENCE: Primary | ICD-10-CM

## 2022-06-13 NOTE — PROGRESS NOTES
Teaching Record:  Information / activity:  Expressive Therapy  3468-4342 Art Therapy - Created an image of a flower garden using markers on paper based on the word POSIES. Represented         six aspects of self in image - Physical,Occupational, Social, Intellectual, Emotional and Spiritual. Processed with the group.    Good participation  Pt focused on the task, shared of the different aspects of self and related to peers. Pt accepted positive peer feedback.      Corey Feldman LPAT

## 2022-06-13 NOTE — PROGRESS NOTES
"CD IOP Group note  Observations:    Engaged in Activity / Process and Self -disclosed: Yes  Applies Topic to self: Yes  Able to give Constructive Feedback: Yes  Affect: anxious  Degree of Insightful Thinking 6  Notes:  4352-5887  Assisted pt with formulation of and processing her sobriety plan for today. Pt has \"no\" SI.      Vanessa Espinal, JONATHANW            "

## 2022-06-13 NOTE — PROGRESS NOTES
"CD IOP Group note  Observations:    Engaged in Activity / Process and Self -disclosed: Yes  Applies Topic to self: Yes  Able to give Constructive Feedback: Yes  Affect: anxious and bright (full range)  Degree of Insightful Thinking 6  Notes:  3230-6397  Pt processed anxiety and triggers to use ET OH. Pt  refers to himself as pt \"savior\". Pt  believes that pt should be subservient to him. Pt works and pays the bills and her  buys his \"toys with his salary with exception he pays LGE bill. Pt pays mortgage and all other bills. \"His credit was so bad he could not be put on the house loan or I would not have been able to finance home. Pt using assertiveness techniques to set boundaries with her . Pt has \"no\" SI.        Vanessa Espinal, LCSW            "

## 2022-06-14 ENCOUNTER — OFFICE VISIT (OUTPATIENT)
Dept: PSYCHIATRY | Facility: HOSPITAL | Age: 42
End: 2022-06-14

## 2022-06-14 DIAGNOSIS — F10.20 UNCOMPLICATED ALCOHOL DEPENDENCE: Primary | ICD-10-CM
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
## 2022-06-14 NOTE — PROGRESS NOTES
Teaching Record:   [id number]  Information / activity:  Process Addictions/[facility] neurochemistry and neurotransmitters function    Good participation      Vanessa Espinal, LCSW

## 2022-06-14 NOTE — PROGRESS NOTES
"CD IOP Group note  Observations:    Engaged in Activity / Process and Self -disclosed: Yes  Applies Topic to self: Yes  Able to give Constructive Feedback: Yes  Affect: anxious  Degree of Insightful Thinking 5  Notes:  3584-9590  Pt processed anxiety. Pt went to see  yesterday and had all her questions answered and developed plan. Pt processed anxiety and sadness that her boss \"Called me stupid to my face yesterday\" Pt has \"no\" STACIE.      Vanessa Espinal LCSW            "

## 2022-06-15 ENCOUNTER — OFFICE VISIT (OUTPATIENT)
Dept: PSYCHIATRY | Facility: HOSPITAL | Age: 42
End: 2022-06-15

## 2022-06-15 DIAGNOSIS — F10.20 UNCOMPLICATED ALCOHOL DEPENDENCE: Primary | ICD-10-CM

## 2022-06-15 NOTE — PLAN OF CARE
Pt has attended 15 [facility] IOP sessions. Pt has obtained AA sponsor. Pt has been apply assertiveness techniques taught in group IOP. Pt encouraging to peers . Pt has relapse prevention plan implemented. Pt processing triggers to use ETOH.

## 2022-06-15 NOTE — PROGRESS NOTES
"CD IOP Group note  Observations:    Engaged in Activity / Process and Self -disclosed: Yes  Applies Topic to self: Yes  Able to give Constructive Feedback: Yes  Affect: anxious  Degree of Insightful Thinking 5  Notes:  3161-7062  Assisted pt with formualtion of and processing her sobriety plan for today. Pt has \"no\" SI.      Vanessa Espinal, JONATHANW            "

## 2022-06-15 NOTE — PROGRESS NOTES
Teaching Record:  Information / activity:  Growing up in an Alcoholic Family documentary    Good participation      4786-1946      Vanessa Espinal, LCSW             I have personally seen and examined this patient.  I have fully participated in the care of this patient. I have reviewed all pertinent clinical information, including history, physical exam, plan and the Resident’s note and agree except as noted.
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
## 2022-06-16 ENCOUNTER — OFFICE VISIT (OUTPATIENT)
Dept: PSYCHIATRY | Facility: HOSPITAL | Age: 42
End: 2022-06-16

## 2022-06-16 DIAGNOSIS — F10.20 UNCOMPLICATED ALCOHOL DEPENDENCE: Primary | ICD-10-CM
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
## 2022-06-16 NOTE — PROGRESS NOTES
"CD IOP Group note  Observations:    Engaged in Activity / Process and Self -disclosed: Yes  Applies Topic to self: Yes  Able to give Constructive Feedback: Yes  Affect: anxious  Degree of Insightful Thinking 5  Notes:  1631-0202  Assisted pt with formulating and processing her weekend sobriety plan. Pt has \"no\" SI.      Vanessa Espinal, JONATHANW            "

## 2022-06-16 NOTE — PROGRESS NOTES
Teaching Record:  Information / activity:  Planning for Sobriety    Good participation   0546-6565      Vanessa Espinal, LCSW

## 2022-06-20 ENCOUNTER — OFFICE VISIT (OUTPATIENT)
Dept: PSYCHIATRY | Facility: HOSPITAL | Age: 42
End: 2022-06-20

## 2022-06-20 DIAGNOSIS — F10.20 UNCOMPLICATED ALCOHOL DEPENDENCE: Primary | ICD-10-CM

## 2022-06-20 NOTE — PROGRESS NOTES
"CD IOP Group note  Observations:    Engaged in Activity / Process and Self -disclosed: Yes  Applies Topic to self: Yes  Able to give Constructive Feedback: Yes  Affect: anxious  Degree of Insightful Thinking 6  Notes:  4033-0357  Pt processed anxiety and triggers for ETOH. Pt using support group for processing triggers when not attending group. Pt encouraging to peers in group. Pt has \"no\" STACIE.      Vanessa Espinal, ANTHONY            "

## 2022-06-20 NOTE — PROGRESS NOTES
"CD IOP Group note  Observations:    Engaged in Activity / Process and Self -disclosed: Yes  Applies Topic to self: Yes  Able to give Constructive Feedback: Yes  Affect: anxious and bright  Degree of Insightful Thinking 6  Notes:  3431-6546  Pt processed feeling physically and emotionally better. Pt processed feeling positive about life and her sobriety. Pt reports over 30 days abstinent.  Pt has \"no\" STACIE.      Vanessa Espinal, ANTHONY            "

## 2022-06-20 NOTE — PROGRESS NOTES
Teaching Record:  Information / activity:  Unguarded Documentary    Good participation   1281-0062      Vanessa Espinal, LCSW

## 2022-06-21 ENCOUNTER — OFFICE VISIT (OUTPATIENT)
Dept: PSYCHIATRY | Facility: HOSPITAL | Age: 42
End: 2022-06-21

## 2022-06-21 DIAGNOSIS — F10.20 UNCOMPLICATED ALCOHOL DEPENDENCE: Primary | ICD-10-CM

## 2022-06-21 NOTE — PROGRESS NOTES
S: The patient continues to do well, maintaining sobriety.  Her only complaint is of occasional poor sleep and she wishes to increase her dose of trazodone    O: Awake alert and oriented all spheres.  Mood euthymic affect congruent.  No suicidal or homicidal ideation or psychotic features.  Judgement and insight intact    A: Alcohol use disorder    P: Will increase trazodone to 100 to 150 mg nightly as needed insomnia.  The patient will likely complete programming later this week

## 2022-06-21 NOTE — PROGRESS NOTES
"CD IOP Group note  Observations:    Engaged in Activity / Process and Self -disclosed: Yes  Applies Topic to self: Yes  Able to give Constructive Feedback: Yes  Affect: anxious  Degree of Insightful Thinking 6  Notes:  0546-8146  Assisted pt with formulation of and processing her sobriety plan for today. Pt has \"no\" SI.      Vanessa Espinal, JONATHANW            "

## 2022-06-21 NOTE — PROGRESS NOTES
Mental Health Awareness Class   (10:30am-11:30am)  Information/activity     Using the Serenity Prayer for Problem Solving    Instructor Arlet Henning LCSW     14:26 EDT     Patient Response Good participation

## 2022-06-21 NOTE — PROGRESS NOTES
"CD IOP Group note  Observations:    Engaged in Activity / Process and Self -disclosed: Yes  Applies Topic to self: Yes  Able to give Constructive Feedback: Yes  Affect: anxious  Degree of Insightful Thinking 5  Notes:  9877-6902  Pt processed anxioety. Pt processed gratitude for her abstinence. Pt processed triggers for ETOH but no cravings. Pt has \"no\" SI.      Vanessa Espinal, [facility]            "

## 2022-06-22 ENCOUNTER — OFFICE VISIT (OUTPATIENT)
Dept: PSYCHIATRY | Facility: HOSPITAL | Age: 42
End: 2022-06-22

## 2022-06-22 DIAGNOSIS — F10.20 UNCOMPLICATED ALCOHOL DEPENDENCE: Primary | ICD-10-CM
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
## 2022-06-22 NOTE — PLAN OF CARE
"Treatment team met to update Plan of care:  Pt has attended 18 [facility] IOP sessions. Pt seeing Dr. Deniz GREENE psychiatrist for medication prescribing and management. Pt has \"no\" SI  Pt has relapse prevention plan in place. Pt processing triggers.   "

## 2022-06-22 NOTE — PROGRESS NOTES
CD IOP Group note  Observations:    Engaged in Activity / Process and Self -disclosed: Yes  Applies Topic to self: Yes  Able to give Constructive Feedback: Yes  Affect: anxious  Degree of Insightful Thinking 6  Notes:  3683-4725  Pt processed triggers for ETOH. Made list of each identified trigger and a coping response to each.      JONATHAN LariosW

## 2022-06-22 NOTE — PROGRESS NOTES
Teaching Record:  Information / activity:  Spirituality and Overview of the 12 Steps    Good participation   5180-6175      Vanessa Espinal, LCSW

## 2022-06-23 ENCOUNTER — OFFICE VISIT (OUTPATIENT)
Dept: PSYCHIATRY | Facility: HOSPITAL | Age: 42
End: 2022-06-23

## 2022-06-23 DIAGNOSIS — F10.20 UNCOMPLICATED ALCOHOL DEPENDENCE: Primary | ICD-10-CM

## 2022-06-23 PROCEDURE — H0015 ALCOHOL AND/OR DRUG SERVICES: HCPCS | Performed by: SOCIAL WORKER

## 2022-06-23 NOTE — PROGRESS NOTES
Teaching Record:  Information / activity:  Dejuan TOLENTINO  Documentary    Good participation   9709-7393      Vanessa Espinal, [facility]

## 2022-06-23 NOTE — PROGRESS NOTES
"CD IOP Group note  Observations:    Engaged in Activity / Process and Self -disclosed: Yes  Applies Topic to self: Yes  Able to give Constructive Feedback: Yes  Affect: anxious  Degree of Insightful Thinking 5  Notes:  0571-3790  Pt processed feeling \"So good today\" Pt processed gratitude for abstinence. Pt processed sleeping better last night. \"I traded mattresses with my son and slept much better\" \"I paid 5,000 for a [place] that sits up head and knees\" \"I hated it and my son is so happy to get it\"      Vanessa Espinal, LCSW            "

## 2022-06-23 NOTE — PROGRESS NOTES
"CD IOP Group note  Observations:    Engaged in Activity / Process and Self -disclosed: Yes  Applies Topic to self: Yes  Able to give Constructive Feedback: Yes  Affect: anxious  Degree of Insightful Thinking 5  Notes:  8722-1112  Assisted pt with formulation of and processing her weekend sobriety plan. Pt has \"no\" SI.      Vanessa Espinal, JONATHANW            "

## 2022-06-27 ENCOUNTER — OFFICE VISIT (OUTPATIENT)
Dept: PSYCHIATRY | Facility: HOSPITAL | Age: 42
End: 2022-06-27

## 2022-06-27 DIAGNOSIS — F10.20 UNCOMPLICATED ALCOHOL DEPENDENCE: Primary | ICD-10-CM

## 2022-06-27 NOTE — PROGRESS NOTES
"CD IOP Group note  Observations:    Engaged in Activity / Process and Self -disclosed: Yes  Applies Topic to self: Yes  Able to give Constructive Feedback: Yes  Affect: anxious  Degree of Insightful Thinking 6  Notes:  7028-1641  Pt processed triggers to think of ETOH. Pt has very different world views than her . \"My  wanted to discusse political things and I just had to insist that we not discuss because neither of us will change our minds\" Pt has \"no\" ISABEL Espinal, JONATHANW            "

## 2022-06-27 NOTE — PROGRESS NOTES
"CD IOP Group note  Observations:    Engaged in Activity / Process and Self -disclosed: Yes  Applies Topic to self: Yes  Able to give Constructive Feedback: Yes  Affect: anxious  Degree of Insightful Thinking 6  Notes:  0714-5747  Pt processed anxiety. Pt assisted with listing triggers that lead to thinking about ETOH with coping response for each trigger listed. Pt not experiencing cravings, but many triggers. Pt assisted with formulation of and processing her sobriety plan for today. Pt has \"no\" SI.      Vanessa Espinal, ANTHONY            "

## 2022-06-27 NOTE — PROGRESS NOTES
Teaching Record:  Information / activity:  Lost in  [place] Documentary    Good participation   0722-0734      Vanessa Espinal, JONATHANW

## 2022-06-28 ENCOUNTER — OFFICE VISIT (OUTPATIENT)
Dept: PSYCHIATRY | Facility: HOSPITAL | Age: 42
End: 2022-06-28

## 2022-06-28 DIAGNOSIS — F10.20 UNCOMPLICATED ALCOHOL DEPENDENCE: Primary | ICD-10-CM

## 2022-06-28 NOTE — PROGRESS NOTES
Teaching Record:  Information / activity:  Expressive Therapy  3561-9505 Art Therapy - Bridge Drawing - Used markers on paper to create image of a bridge of choice between           feelings of being in addiction and how wants to feel in recovery, a john was placed showing felt progress,           and wrote a title for the picture; processed with the group    Good participation  Pt focused on the task and related to peer. Pt described life in addiction as turmoil. Pt also described currently feeling out of the turmoil and starting to feel more stable with and even road ahead in recovery. Pt shared to bright colors representing brighter feelings. Pt offered supportive feedback to peer and accepted the same from the peer.       EFREN Wan

## 2022-06-28 NOTE — PROGRESS NOTES
"CD IOP Group note  Observations:    Engaged in Activity / Process and Self -disclosed: Yes  Applies Topic to self: Yes  Able to give Constructive Feedback: Yes  Affect: anxious  Degree of Insightful Thinking 5  Notes:  1729-4544  Pt processed anxiety.Explored with pt past issues and relation to present anxiety. Develop an awareness of the cognitive and physical response to anxiety. Pt to identify the key past and present life conflicts as pt as pt works toward resolution of unresolved issues. Pt has \"no\" SI.      Vanessa Espinal LCSW            "

## 2022-06-28 NOTE — PROGRESS NOTES
Teaching Record: [id number]  Information / activity:  Lost in  [place] Documentary completed DVD    Good participation      JONATHAN LariosW

## 2022-06-29 ENCOUNTER — OFFICE VISIT (OUTPATIENT)
Dept: PSYCHIATRY | Facility: HOSPITAL | Age: 42
End: 2022-06-29

## 2022-06-29 DIAGNOSIS — F10.20 UNCOMPLICATED ALCOHOL DEPENDENCE: Primary | ICD-10-CM

## 2022-06-29 NOTE — PROGRESS NOTES
Teaching Record:  Information / activity:  [employer] Documentary    Good participation  9383-2103      JONATHAN LariosW

## 2022-06-30 ENCOUNTER — OFFICE VISIT (OUTPATIENT)
Dept: PSYCHIATRY | Facility: HOSPITAL | Age: 42
End: 2022-06-30

## 2022-06-30 ENCOUNTER — DOCUMENTATION (OUTPATIENT)
Dept: PSYCHIATRY | Facility: HOSPITAL | Age: 42
End: 2022-06-30

## 2022-06-30 DIAGNOSIS — F10.20 UNCOMPLICATED ALCOHOL DEPENDENCE: Primary | ICD-10-CM

## 2022-06-30 DIAGNOSIS — F33.0 MILD EPISODE OF RECURRENT MAJOR DEPRESSIVE DISORDER: ICD-10-CM

## 2022-06-30 NOTE — PROGRESS NOTES
"9265-5860   CD IOP Group note  Observations:    Engaged in Activity / Process and Self -disclosed: Yes  Applies Topic to self: Yes  Able to give Constructive Feedback: Yes  Affect: anxious  Degree of Insightful Thinking 6  Notes:  7763-0293  Pt processed gratitude for her abstinence. Pt processed lingering low self image and esteem. Reinforced with pt use of more realistic, positive messages to self in interpreting life events. Pt has \"no\" SI.      Vanessa Espinal, JONATHANW            "

## 2022-06-30 NOTE — PLAN OF CARE
"Pt has attended 23 [facility] IOP sessions. She has made tremendous progress. Pt will complete tomorrow and transition to Continuing Care program at this facility. Pt has been evaluated by Dr. Ayanna GREENE and prescribed medication to address depression. Pt has been compliant with medication. Pt declined any MAT with Dr. Deniz GREENE. Pt encouraging of peers and has crated good sober network and utilized it. Pt has \"no\" SI.  "

## 2022-06-30 NOTE — PROGRESS NOTES
"CD IOP Group note  Observations:    Engaged in Activity / Process and Self -disclosed: Yes  Applies Topic to self: Yes  Able to give Constructive Feedback: Yes  Affect: anxious  Degree of Insightful Thinking 6  Notes:  0034-2281  Pt fully participated in closure activities saying good by as she has completed acute phase of [facility] treatment today. Pt is transitioning to Continuing Care program at this facility. Pt great full for IOP. Pt has \"no\" STACIE.      Vanessa Espinal, ANTHONY            "

## 2022-06-30 NOTE — PROGRESS NOTES
S: The patient is in bright spirits.  She has returned to work and will complete programming today.    O: Awake alert and oriented all spheres.  Mood euthymic affect congruent.  Speech relevant and coherent.  No suicidal or homicidal ideation or psychotic features    A: Alcohol use disorder, depressive disorder unspecified    P: Discharge from programming today.

## 2022-06-30 NOTE — PROGRESS NOTES
"CD IOP Group note  Observations:    Engaged in Activity / Process and Self -disclosed: Yes  Applies Topic to self: Yes  Able to give Constructive Feedback: Yes  Affect: anxious  Degree of Insightful Thinking 6  Notes:  Pt processed anxiety related to self esteem. Assisted pt in identifying distorted, negative beliefs about self and the world. Pt has \"no\" SI.      Vanessa Espinal LCSW            "

## 2022-06-30 NOTE — PROGRESS NOTES
Discharge Summary    Sharron attended  23 Sessions         Registration Date 5-19-22  Discharge Date  6/30/2022       Summary of Treatment and Progress towards goals:   Pt engaged in group process quickly. Pt established supportive sober network of supports. Pt evaluated by Dr. Deniz GREENE and prescribed medications for Major depression    Diagnostic Impression:   ETOH use disorder Major depression    Aftercare Plan:  Pt will attend Continuing Care program @[facility]. One day per week for group therapy for 1.5 hours of group therapy for 6 months.     Current Medications: Trazodone, Vistaril, Lexpro,   Current Outpatient Medications   Medication Sig Dispense Refill   • fexofenadine (ALLEGRA) 30 MG tablet Take 30 mg by mouth 2 (two) times a day.     • folic acid (FOLVITE) 1 MG tablet TAKE 1 TABLET BY MOUTH DAILY 90 tablet 3   • levETIRAcetam (KEPPRA) 500 MG tablet TAKE 1 1/2 TABLETS BY MOUTH EVERY MORNING AND 1 AT NIGHT 225 tablet 3   • MULTIPLE MINERALS-VITAMINS PO Take 1 tablet by mouth.     • TRI-SPRINTEC 0.18/0.215/0.25 MG-35 MCG per tablet TK 1 T PO  QD CONTINOUSLY. SKIP PLACEBO AND GO TO THE NEXT PACK  1   • XULANE 150-35 MCG/24HR BREANA 1 PA EXT TO THE SKIN 1 TIME WEEKLY     • zolpidem (AMBIEN) 10 MG tablet TAKE ONE TABLET BY MOUTH EVERY NIGHT AT BEDTIME AS NEEDED FOR SLEEP 30 tablet 5     No current facility-administered medications for this visit.       Referrals/ Appointments :   Pt referred to Dr. Deniz GREENE for psychiatric med management and [facility]        Vanessa Espinal LCSW

## 2022-06-30 NOTE — PROGRESS NOTES
Teaching Record: )[id number]  Information / activity:  Alcohol Education, Heroin and Opiates Education and Marijuana Education    Good participation      Vanessa Espinal, LCSW

## 2022-06-30 NOTE — PROGRESS NOTES
"CD IOP Group note  Observations:    Engaged in Activity / Process and Self -disclosed: Yes  Applies Topic to self: Yes  Able to give Constructive Feedback: Yes  Affect: anxious  Degree of Insightful Thinking 5  Notes:  2911-9831  Pt processed anxiety and sadness. Pt received TC last nigh from peer in [facility] who said she had relapsed using methamphetamine. \"She sounded high as a kite\" \"I could tell from the first word out of her mouth she was very messed up\" \"She said that her family was coming to get her and take her to her family home in [place]\" Processed with pt that she can not cause anyone to use, control any ones use, or cure it. Pt doing an intervention with family of close friend who is still active in her addiction.  Pt processed gratitude for pt sobriety. Pt has \"no\" STACIE Espinal, LCSW            "

## 2022-07-14 ENCOUNTER — TELEPHONE (OUTPATIENT)
Dept: INTERNAL MEDICINE | Facility: CLINIC | Age: 42
End: 2022-07-14

## 2022-07-14 NOTE — TELEPHONE ENCOUNTER
Caller: Sharron Guardado    Relationship: Self    Best call back number: [phone]*    What is the best time to reach you: ANYTIME    Who are you requesting to speak with (clinical staff, provider,  specific staff member): CLINICAL    What was the call regarding: PATIENT CALLING STATING THAT SHE HAS CALLED THE DIVISION OF DRIVERS LICENSE, AND THEY STILL HAVE NOT RECEIVED THE PAPERWORK FROM THE OFFICE. THE PATIENT IS REQUESTING A CALL BACK.    Do you require a callback: YES

## 2022-07-14 NOTE — TELEPHONE ENCOUNTER
Caller: Sharron Guardado    Relationship to patient: Self    Best call back number: [phone]    Patient is needing:  PATIENT IS CALLING FOR STATUS OF THE PAPERWORK SHE HAS SENT TWICE TO THE OFFICE IN April AND THEN AGAIN IN MAY-June.  SHE STATES IT IS REGARDING HER SEIZURES AND THE PAPERWORK IS GOING TO THE TRANSPORTATION CABINET, DEPARTMENT OF DRIVERS [employer].  SHE STATES IT HAD BE SENT BACK TO THE TRANSPORTATION CABINET IN 90 DAYS.  PLEASE CONTACT PATIENT TO ADVISE HER OF THE STATUS OF THIS PAPERWORK  [phone]

## 2022-07-18 NOTE — TELEPHONE ENCOUNTER
Caller: Sharron Guardado    Relationship: Self    Best call back number: [phone]      PATIENT IS CALLING BACK ON THE STATUS OF THIS REQUEST. PLEASE CALL AND ADVISE.

## 2022-07-19 NOTE — TELEPHONE ENCOUNTER
This is the first time I am seeing the paperwork.  According to the paperwork at the very top of the page, the medical examination has to have been within the last 90 days.  I cannot use the January appt.  She will need a quick appt for me to complete.  I would work her in this week somewhere.  I will hang onto the paperwork.

## 2022-07-20 ENCOUNTER — OFFICE VISIT (OUTPATIENT)
Dept: INTERNAL MEDICINE | Facility: CLINIC | Age: 42
End: 2022-07-20

## 2022-07-20 VITALS
HEIGHT: 65 IN | OXYGEN SATURATION: 98 % | SYSTOLIC BLOOD PRESSURE: 140 MMHG | HEART RATE: 77 BPM | DIASTOLIC BLOOD PRESSURE: 90 MMHG | WEIGHT: 147 LBS | BODY MASS INDEX: 24.49 KG/M2

## 2022-07-20 DIAGNOSIS — G40.109 TEMPORAL LOBE EPILEPSY: Primary | Chronic | ICD-10-CM

## 2022-07-20 PROCEDURE — 99213 OFFICE O/P EST LOW 20 MIN: CPT | Performed by: FAMILY MEDICINE

## 2022-07-20 NOTE — PROGRESS NOTES
"Chief Complaint  No chief complaint on file.    Subjective        Sharron Guardado presents to [facility]  History of Present Illness     She is here today to discuss her epilepsy.  She needs this visit today to evaluate her for her continued safety for driving.  Her last seizure was March 2021.  She has been stable on her antiepileptic therapy with Keppra.  She is tolerating the medicine okay without any side effects.    Objective   Vital Signs:  /90   Pulse 77   Ht 165.1 cm (65\")   Wt 66.7 kg (147 lb)   SpO2 98%   BMI 24.46 kg/m²   Estimated body mass index is 24.46 kg/m² as calculated from the following:    Height as of this encounter: 165.1 cm (65\").    Weight as of this encounter: 66.7 kg (147 lb).    BMI is within normal parameters. No other follow-up for BMI required.      Physical Exam  Vitals and nursing note reviewed.   Constitutional:       General: She is not in acute distress.     Appearance: Normal appearance.   Cardiovascular:      Rate and Rhythm: Normal rate and regular rhythm.      Heart sounds: Normal heart sounds. No murmur heard.  Pulmonary:      Effort: Pulmonary effort is normal.      Breath sounds: Normal breath sounds.   Neurological:      Mental Status: She is alert.        Result Review :                Assessment and Plan   Diagnoses and all orders for this visit:    1. Temporal lobe epilepsy (HCC) (Primary)    Based on the laws of the [facility], she would meet criteria to continue driving at this time.  I completed her paperwork and we will scan into the system and fax in for review.  She was also provided a copy of today's paperwork.  Please continue Keppra as prescribed.         Follow Up   Return in about 22 days (around 8/11/2022).  Patient was given instructions and counseling regarding her condition or for health maintenance advice. Please see specific information pulled into the AVS if appropriate.       "

## 2022-11-04 DIAGNOSIS — R31.21 ASYMPTOMATIC MICROSCOPIC HEMATURIA: Primary | ICD-10-CM

## 2022-11-07 ENCOUNTER — TELEPHONE (OUTPATIENT)
Dept: INTERNAL MEDICINE | Facility: CLINIC | Age: 42
End: 2022-11-07

## 2022-11-09 ENCOUNTER — OFFICE VISIT (OUTPATIENT)
Dept: INTERNAL MEDICINE | Facility: CLINIC | Age: 42
End: 2022-11-09

## 2022-11-09 VITALS
TEMPERATURE: 98 F | DIASTOLIC BLOOD PRESSURE: 70 MMHG | OXYGEN SATURATION: 96 % | BODY MASS INDEX: 23.66 KG/M2 | SYSTOLIC BLOOD PRESSURE: 110 MMHG | RESPIRATION RATE: 18 BRPM | WEIGHT: 142 LBS | HEART RATE: 78 BPM | HEIGHT: 65 IN

## 2022-11-09 DIAGNOSIS — Z00.00 ENCOUNTER FOR HEALTH MAINTENANCE EXAMINATION IN ADULT: Primary | ICD-10-CM

## 2022-11-09 DIAGNOSIS — F51.01 PRIMARY INSOMNIA: ICD-10-CM

## 2022-11-09 DIAGNOSIS — G89.4 CHRONIC PAIN SYNDROME: ICD-10-CM

## 2022-11-09 PROCEDURE — 99396 PREV VISIT EST AGE 40-64: CPT | Performed by: FAMILY MEDICINE

## 2022-11-09 RX ORDER — GABAPENTIN 600 MG/1
600 TABLET ORAL NIGHTLY
Qty: 30 TABLET | Refills: 5 | Status: SHIPPED | OUTPATIENT
Start: 2022-11-09

## 2022-11-09 RX ORDER — ZOLPIDEM TARTRATE 10 MG/1
10 TABLET ORAL NIGHTLY PRN
COMMUNITY
End: 2022-11-09 | Stop reason: SDUPTHER

## 2022-11-09 RX ORDER — GABAPENTIN 600 MG/1
600 TABLET ORAL 3 TIMES DAILY
COMMUNITY
End: 2022-11-09 | Stop reason: SDUPTHER

## 2022-11-09 RX ORDER — ZOLPIDEM TARTRATE 10 MG/1
10 TABLET ORAL NIGHTLY PRN
Qty: 30 TABLET | Refills: 5 | Status: SHIPPED | OUTPATIENT
Start: 2022-11-09

## 2022-11-09 NOTE — PROGRESS NOTES
"Chief Complaint  Annual Exam and Anxiety    Subjective            Sharron Guardado presents to [facility]  History of Present Illness    CPE- Patient reporting that health is doing well overall.  Patient is here today for annual physical.  Eating a balanced diet.    I reviewed her record and noticed that she was admitted for an intentional overdose and suicidal attempt in May.  It looks like she had drank half a pint of vodka and ingested an unknown amount of Ambien.  She went to [facility] after the hospitalization.  She says she had some stressors involving her  and work.  She has not had those thoughts since then and says that it was a momentary weakness.  She contracts for safety and promises she will never do anything like that again.  It sounds like she did not take very much Ambien at all and just had some alcohol.  More than likely she had incurred a seizure which alcohol and Ambien together could have lowered her seizure threshold enough at the time.    Labs: Labs completed recently and reviewed as it the bottom of the note.  Urine was bloody with leukocytes and protein.  Bacteria 2+ with epithelial cells.  She was starting her menstrual cycle that day.      Cervical cancer screening: UTD        Review of Systems      Objective   Vital Signs:   /70 (BP Location: Left arm, Patient Position: Sitting, Cuff Size: Small Adult)   Pulse 78   Temp 98 °F (36.7 °C)   Resp 18   Ht 165.1 cm (65\")   Wt 64.4 kg (142 lb)   SpO2 96%   BMI 23.63 kg/m²     Physical Exam  Vitals and nursing note reviewed.   Constitutional:       General: She is not in acute distress.     Appearance: Normal appearance.   HENT:      Right Ear: Tympanic membrane, ear canal and external ear normal.      Left Ear: Tympanic membrane, ear canal and external ear normal.      Nose: Nose normal.      Mouth/Throat:      Mouth: Mucous membranes are moist.   Eyes:      General:         Right eye: No discharge.    "      Left eye: No discharge.      Conjunctiva/sclera: Conjunctivae normal.   Cardiovascular:      Rate and Rhythm: Normal rate and regular rhythm.      Pulses: Normal pulses.      Heart sounds: Normal heart sounds.   Pulmonary:      Effort: Pulmonary effort is normal.      Breath sounds: Normal breath sounds.   Abdominal:      General: Bowel sounds are normal. There is no distension.      Palpations: Abdomen is soft.      Tenderness: There is no abdominal tenderness.   Musculoskeletal:      Cervical back: Neck supple.      Right lower leg: No edema.      Left lower leg: No edema.   Lymphadenopathy:      Cervical: No cervical adenopathy.   Skin:     General: Skin is warm.      Findings: No rash.   Neurological:      General: No focal deficit present.      Mental Status: She is alert. Mental status is at baseline.   Psychiatric:         Attention and Perception: Attention and perception normal.         Mood and Affect: Mood and affect normal.         Speech: Speech normal.         Behavior: Behavior normal. Behavior is cooperative.         Thought Content: Thought content normal.         Judgment: Judgment normal.        Result Review :   The following data was reviewed by: Usama Babin MD on 11/09/2022:  Common labs    Common Labs 5/16/22 5/16/22 5/18/22 11/3/22 11/3/22 11/3/22 11/3/22    2353 2353  0918 0918 0918 0918   Glucose  81 171 (A)  91     BUN  14 12  15     Creatinine  0.70 0.75  0.87     Sodium  141 136  140     Potassium  5.0 3.6  4.4     Chloride  104 107  103     Calcium  9.3 7.6 (A)  9.7     Total Protein     6.8     Albumin  4.40 3.50  4.70     Total Bilirubin  0.4 0.4  1.1     Alkaline Phosphatase  75 64  77     AST (SGOT)  37 (A) 17  22     ALT (SGPT)  19 13  15     WBC 8.40   6.50      Hemoglobin 13.1   13.1      Hematocrit 40.1   39.5      Platelets 261   241      Total Cholesterol       197   Triglycerides       132   HDL Cholesterol       95 (A)   LDL Cholesterol        80   Hemoglobin A1C       5.00    (A) Abnormal value       Comments are available for some flowsheets but are not being displayed.                     Assessment and Plan    Diagnoses and all orders for this visit:    1. Encounter for health maintenance examination in adult (Primary)    2. Chronic pain syndrome  -     gabapentin (NEURONTIN) 600 MG tablet; Take 1 tablet by mouth Every Night.  Dispense: 30 tablet; Refill: 5    3. Primary insomnia  -     zolpidem (Ambien) 10 MG tablet; Take 1 tablet by mouth At Night As Needed for Sleep.  Dispense: 30 tablet; Refill: 5          The patient was counseled regarding nutrition, physical activity, healthy weight, injury prevention, misuse of tobacco, alcohol and illicit drugs, mental health, immunizations, and screenings.    I will continue the medicine but I told her I would not be giving her 3-month supplies of any sedating medicines for now.  I told her that typically I discontinue medicines if something like this happens however she seems genuine and it was a one-time occurrence.  I explained that if she were to have thoughts like that again, I will be discontinuing these medicines and have her see psychiatry.  She is thinking about seeing a therapist to help with the stressors as well.      Follow Up   Return in about 26 weeks (around 5/10/2023) for Recheck - gabapentin and Ambien.  Patient was given instructions and counseling regarding her condition or for health maintenance advice. Please see specific information pulled into the AVS if appropriate.

## 2022-12-22 ENCOUNTER — TELEPHONE (OUTPATIENT)
Dept: INTERNAL MEDICINE | Facility: CLINIC | Age: 42
End: 2022-12-22

## 2022-12-22 NOTE — TELEPHONE ENCOUNTER
----- Message from Usama Babin MD sent at 12/22/2022  7:42 AM EST -----  Please let her know there was no growth on her urine culture

## 2022-12-22 NOTE — TELEPHONE ENCOUNTER
Attempted to call patient, VICM advising call back    HUB TO READ  Please let her know there was no growth on her urine culture

## 2022-12-23 RX ORDER — LEVETIRACETAM 500 MG/1
TABLET ORAL
Qty: 225 TABLET | Refills: 3 | OUTPATIENT
Start: 2022-12-23

## 2023-05-11 ENCOUNTER — OFFICE VISIT (OUTPATIENT)
Dept: INTERNAL MEDICINE | Facility: CLINIC | Age: 43
End: 2023-05-11
Payer: COMMERCIAL

## 2023-05-11 VITALS
RESPIRATION RATE: 18 BRPM | SYSTOLIC BLOOD PRESSURE: 128 MMHG | DIASTOLIC BLOOD PRESSURE: 86 MMHG | BODY MASS INDEX: 22.82 KG/M2 | WEIGHT: 137 LBS | HEIGHT: 65 IN | HEART RATE: 68 BPM | OXYGEN SATURATION: 98 %

## 2023-05-11 DIAGNOSIS — F51.01 PRIMARY INSOMNIA: Primary | Chronic | ICD-10-CM

## 2023-05-11 DIAGNOSIS — M50.320 DEGENERATION OF INTERVERTEBRAL DISC OF MID-CERVICAL REGION, UNSPECIFIED SPINAL LEVEL: Chronic | ICD-10-CM

## 2023-05-11 DIAGNOSIS — F41.1 GENERALIZED ANXIETY DISORDER: ICD-10-CM

## 2023-05-11 DIAGNOSIS — Z79.899 HIGH RISK MEDICATION USE: ICD-10-CM

## 2023-05-11 DIAGNOSIS — G40.109 TEMPORAL LOBE EPILEPSY: ICD-10-CM

## 2023-05-11 PROBLEM — I10 ESSENTIAL HYPERTENSION: Chronic | Status: RESOLVED | Noted: 2021-06-25 | Resolved: 2023-05-11

## 2023-05-11 PROBLEM — Z91.51 HISTORY OF SUICIDE ATTEMPT: Status: ACTIVE | Noted: 2022-05-17

## 2023-05-11 PROBLEM — F10.10 ALCOHOL ABUSE: Status: RESOLVED | Noted: 2022-05-17 | Resolved: 2023-05-11

## 2023-05-11 PROBLEM — T50.902A INTENTIONAL OVERDOSE, INITIAL ENCOUNTER: Status: RESOLVED | Noted: 2022-05-17 | Resolved: 2023-05-11

## 2023-05-11 PROBLEM — F10.11 HISTORY OF ALCOHOL ABUSE: Status: ACTIVE | Noted: 2023-05-11

## 2023-05-11 PROCEDURE — 99214 OFFICE O/P EST MOD 30 MIN: CPT | Performed by: FAMILY MEDICINE

## 2023-05-11 RX ORDER — ESCITALOPRAM OXALATE 10 MG/1
10 TABLET ORAL DAILY
Qty: 90 TABLET | Refills: 1 | Status: SHIPPED | OUTPATIENT
Start: 2023-05-11

## 2023-05-11 NOTE — PROGRESS NOTES
"Chief Complaint  Follow-up, Hypertension, and Anxiety    Subjective        Sharron Guardado presents to [facility]  History of Present Illness     She is being seen today for her 6-month follow-up for zolpidem and gabapentin.  She takes the zolpidem for insomnia and the gabapentin for chronic pain secondary to degeneration cervical discs.  No side effects of the medications including daytime sedation or falls.  We are filling the prescriptions on 30-day intervals due to the history of overdose with alcohol.  She has had no further incidents of alcohol abuse or overdose attempts since I saw her last fall.  PDMP reviewed and appropriate.  She is due for screening.    She is in between neurologists and may need a fill on Keppra while waiting to see new one.  She will let me know if that occurs.  Denies any recent seizures.    She has been dealing with some increasing anxiety driven by her job.  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  She thinks she was on Lexapro in the past and on medication review it looks like she was on 10 mg which seemed to do okay for her.  Denies depression.    Objective   Vital Signs:  /86   Pulse 68   Resp 18   Ht 165.1 cm (65\")   Wt 62.1 kg (137 lb)   SpO2 98%   BMI 22.80 kg/m²   Estimated body mass index is 22.8 kg/m² as calculated from the following:    Height as of this encounter: 165.1 cm (65\").    Weight as of this encounter: 62.1 kg (137 lb).       BMI is within normal parameters. No other follow-up for BMI required.      Physical Exam  Vitals and nursing note reviewed.   Constitutional:       General: She is not in acute distress.     Appearance: Normal appearance.   Cardiovascular:      Rate and Rhythm: Normal rate and regular rhythm.      Heart sounds: Normal heart sounds. No murmur heard.  Pulmonary:      Effort: Pulmonary effort is normal.      Breath sounds: Normal breath sounds.   Neurological:      Mental Status: She is alert. "   Psychiatric:         Attention and Perception: Attention and perception normal.         Mood and Affect: Affect normal. Mood is anxious.         Speech: Speech normal.         Behavior: Behavior normal. Behavior is cooperative.         Thought Content: Thought content normal.         Judgment: Judgment normal.      Comments: Very pleasant        Result Review :  The following data was reviewed by: Usama Babin MD on 05/11/2023:  Common labs        5/18/2022    03:39 11/3/2022    09:18   Common Labs   Glucose 171   91     BUN 12   15     Creatinine 0.75   0.87     Sodium 136   140     Potassium 3.6   4.4     Chloride 107   103     Calcium 7.6   9.7     Total Protein  6.8     Albumin 3.50   4.70     Total Bilirubin 0.4   1.1     Alkaline Phosphatase 64   77     AST (SGOT) 17   22     ALT (SGPT) 13   15     WBC  6.50     Hemoglobin  13.1     Hematocrit  39.5     Platelets  241     Total Cholesterol  197     Triglycerides  132     HDL Cholesterol  95     LDL Cholesterol   80     Hemoglobin A1C  5.00                    Assessment and Plan   Diagnoses and all orders for this visit:    1. Primary insomnia (Primary)    2. Degeneration of intervertebral disc of mid-cervical region, unspecified spinal level    3. High risk medication use  -     Compliance Drug Analysis, Ur - Urine, Clean Catch    4. Temporal lobe epilepsy    5. Generalized anxiety disorder  -     escitalopram (Lexapro) 10 MG tablet; Take 1 tablet by mouth Daily.  Dispense: 90 tablet; Refill: 1      Insomnia and neck pain are stable.  Continue gabapentin and zolpidem as prescribed.  Compliance drug screen ordered.  Temporal lobe epilepsy is stable and I am happy to prescribe her Keppra while she is waiting to see a new neurologist.  I will get her started on some Lexapro for the generalized anxiety.  She was counseled it can take 4 to 6 weeks to reach peak effect and then after that I can increase to 20 mg if she is still symptomatic.  Otherwise she can  continue on that dosing.         Follow Up   Return in about 6 months (around 11/11/2023) for Next scheduled follow up.  Patient was given instructions and counseling regarding her condition or for health maintenance advice. Please see specific information pulled into the AVS if appropriate.

## 2023-05-16 LAB — DRUGS UR: NORMAL

## 2023-05-17 DIAGNOSIS — G89.4 CHRONIC PAIN SYNDROME: ICD-10-CM

## 2023-05-17 RX ORDER — GABAPENTIN 600 MG/1
TABLET ORAL
Qty: 30 TABLET | Refills: 0 | Status: SHIPPED | OUTPATIENT
Start: 2023-05-17

## 2023-05-17 NOTE — TELEPHONE ENCOUNTER
Rx Refill Note  Requested Prescriptions     Pending Prescriptions Disp Refills   • gabapentin (NEURONTIN) 600 MG tablet [Pharmacy Med Name: GABAPENTIN 600 MG TABLET] 30 tablet      Sig: TAKE ONE TABLET BY MOUTH ONCE NIGHTLY      Last office visit with prescribing clinician: 5/11/2023   Last telemedicine visit with prescribing clinician: 5/11/2023   Next office visit with prescribing clinician: 1/23/2024     Lisa Sahu MA  05/17/23, 15:00 EDT

## 2023-05-17 NOTE — TELEPHONE ENCOUNTER
I am covering for Usama Baibn MD , who is off today.     Medication refilled for patient: Gabapentin

## 2023-05-18 DIAGNOSIS — F51.01 PRIMARY INSOMNIA: ICD-10-CM

## 2023-05-19 RX ORDER — ZOLPIDEM TARTRATE 10 MG/1
TABLET ORAL
Qty: 30 TABLET | Refills: 5 | Status: SHIPPED | OUTPATIENT
Start: 2023-05-19

## 2023-05-19 NOTE — TELEPHONE ENCOUNTER
Rx Refill Note  Requested Prescriptions     Pending Prescriptions Disp Refills   • zolpidem (AMBIEN) 10 MG tablet [Pharmacy Med Name: ZOLPIDEM TARTRATE 10 MG TABLET] 30 tablet      Sig: TAKE ONE TABLET BY MOUTH ONCE NIGHTLY AS NEEDED FOR SLEEP.      Last office visit with prescribing clinician: 5/11/2023   Last telemedicine visit with prescribing clinician: 5/17/2023   Next office visit with prescribing clinician: 1/23/2024     Lisa Sahu MA  05/19/23, 08:09 EDT

## 2023-06-08 ENCOUNTER — TELEPHONE (OUTPATIENT)
Dept: MAMMOGRAPHY | Facility: CLINIC | Age: 43
End: 2023-06-08
Payer: COMMERCIAL

## 2023-06-08 NOTE — TELEPHONE ENCOUNTER
Received referral from [facility], Dr. Lora for High Risk. LVM on 5/12 @ 1:54PM and on 6/8 @ 1:32PM to schedule with [facility]. 2nd attempt. MB

## 2023-07-21 DIAGNOSIS — M50.320 DEGENERATION OF INTERVERTEBRAL DISC OF MID-CERVICAL REGION, UNSPECIFIED SPINAL LEVEL: ICD-10-CM

## 2023-07-21 NOTE — TELEPHONE ENCOUNTER
Caller: Sharron Guardado    Relationship: Self    Best call back number: [phone]    Requested Prescriptions:   Requested Prescriptions     Pending Prescriptions Disp Refills    gabapentin (NEURONTIN) 600 MG tablet 30 tablet 5     Sig: Take 1 tablet by mouth Every Night.        Pharmacy where request should be sent: [facility] [id number] [facility] [address] AT [facility] [address] - [phone] PH - [phone] FX     Last office visit with prescribing clinician: 5/11/2023   Last telemedicine visit with prescribing clinician: Visit date not found   Next office visit with prescribing clinician: 1/23/2024     Does the patient have less than a 3 day supply:  [x] Yes  [] No    Would you like a call back once the refill request has been completed: [x] Yes [] No    If the office needs to give you a call back, can they leave a voicemail: [x] Yes [] No    Jeremy Reyes Rep   07/21/23 15:38 EDT

## 2023-07-24 RX ORDER — GABAPENTIN 600 MG/1
600 TABLET ORAL NIGHTLY
Qty: 90 TABLET | Refills: 1 | Status: SHIPPED | OUTPATIENT
Start: 2023-07-24

## 2023-08-02 ENCOUNTER — TELEPHONE (OUTPATIENT)
Dept: INTERNAL MEDICINE | Facility: CLINIC | Age: 43
End: 2023-08-02

## 2023-08-02 NOTE — TELEPHONE ENCOUNTER
Caller: Sharron Guardado    Relationship: Self    Best call back number:[phone]     Who are you requesting to speak with (clinical staff, provider,  specific staff member): CLINICAL STAFF     What was the call regarding:WANTING TO KNOW IF OFFICE RECEIVED PAPERWORK FROM [place] TRANSPORTATION CABINET AND WHEN IT WILL BE READY TO

## 2023-08-23 ENCOUNTER — TELEPHONE (OUTPATIENT)
Dept: INTERNAL MEDICINE | Facility: CLINIC | Age: 43
End: 2023-08-23
Payer: COMMERCIAL

## 2023-08-23 NOTE — TELEPHONE ENCOUNTER
Patient returned call again, she has spoke with transportation people and they informed her they have not received paper work.

## 2023-11-03 DIAGNOSIS — F41.1 GENERALIZED ANXIETY DISORDER: ICD-10-CM

## 2023-11-03 RX ORDER — ESCITALOPRAM OXALATE 10 MG/1
10 TABLET ORAL DAILY
Qty: 90 TABLET | Refills: 1 | Status: SHIPPED | OUTPATIENT
Start: 2023-11-03

## 2023-11-17 DIAGNOSIS — F51.01 PRIMARY INSOMNIA: ICD-10-CM

## 2023-11-17 RX ORDER — ZOLPIDEM TARTRATE 10 MG/1
TABLET ORAL
Qty: 30 TABLET | Refills: 2 | Status: SHIPPED | OUTPATIENT
Start: 2023-11-17

## 2023-11-17 NOTE — TELEPHONE ENCOUNTER
Last office visit with prescribing clinician: 5/11/2023     Next office visit with prescribing clinician: 1/23/2024

## 2024-01-23 ENCOUNTER — OFFICE VISIT (OUTPATIENT)
Dept: INTERNAL MEDICINE | Facility: CLINIC | Age: 44
End: 2024-01-23
Payer: COMMERCIAL

## 2024-01-23 VITALS
TEMPERATURE: 98.3 F | HEART RATE: 77 BPM | OXYGEN SATURATION: 96 % | WEIGHT: 144.8 LBS | BODY MASS INDEX: 24.12 KG/M2 | HEIGHT: 65 IN | DIASTOLIC BLOOD PRESSURE: 86 MMHG | SYSTOLIC BLOOD PRESSURE: 132 MMHG

## 2024-01-23 DIAGNOSIS — Z11.59 ENCOUNTER FOR HEPATITIS C SCREENING TEST FOR LOW RISK PATIENT: ICD-10-CM

## 2024-01-23 DIAGNOSIS — Z13.0 SCREENING FOR DEFICIENCY ANEMIA: ICD-10-CM

## 2024-01-23 DIAGNOSIS — M70.62 GREATER TROCHANTERIC BURSITIS OF LEFT HIP: ICD-10-CM

## 2024-01-23 DIAGNOSIS — Z13.220 SCREENING FOR LIPID DISORDERS: ICD-10-CM

## 2024-01-23 DIAGNOSIS — Z79.899 HIGH RISK MEDICATION USE: ICD-10-CM

## 2024-01-23 DIAGNOSIS — Z13.29 SCREENING FOR THYROID DISORDER: ICD-10-CM

## 2024-01-23 DIAGNOSIS — Z00.00 ENCOUNTER FOR HEALTH MAINTENANCE EXAMINATION: Primary | ICD-10-CM

## 2024-01-23 DIAGNOSIS — Z13.1 SCREENING FOR DIABETES MELLITUS: ICD-10-CM

## 2024-01-23 NOTE — PROGRESS NOTES
"Chief Complaint   Patient presents with    Annual Exam       Subjective       CPE- Patient is here today for annual physical.  Overall doing well.  She still works at [employer] pod[employer].  She is taking her medications as prescribed below.  PDMP reviewed and appropriate for her controlled medication.    She is having some intermittent pain in the left hip area.  Throbs towards the end of the day and feels like the pain is internal.      Labs: Due for routine labs    Breast cancer screening: Per gynecology, UTD  Cervical cancer screening: Up-to-date      Current Outpatient Medications:     escitalopram (LEXAPRO) 10 MG tablet, TAKE 1 TABLET BY MOUTH DAILY, Disp: 90 tablet, Rfl: 1    fexofenadine (ALLEGRA) 30 MG tablet, Take 1 tablet by mouth 2 (Two) Times a Day., Disp: , Rfl:     gabapentin (NEURONTIN) 600 MG tablet, Take 1 tablet by mouth Every Night., Disp: 90 tablet, Rfl: 1    levETIRAcetam (KEPPRA) 500 MG tablet, TAKE 1 1/2 TABLETS BY MOUTH EVERY MORNING AND 1 AT NIGHT, Disp: 225 tablet, Rfl: 3    MULTIPLE MINERALS-VITAMINS PO, Take 1 tablet by mouth., Disp: , Rfl:     TRI-SPRINTEC 0.18/0.215/0.25 MG-35 MCG per tablet, TK 1 T PO  QD CONTINOUSLY. SKIP PLACEBO AND GO TO THE NEXT PACK, Disp: , Rfl: 1    XULANE 150-35 MCG/24HR, BREANA 1 PA EXT TO THE SKIN 1 TIME WEEKLY, Disp: , Rfl:     zolpidem (AMBIEN) 10 MG tablet, TAKE ONE TABLET BY MOUTH ONCE NIGHTLY AS NEEDED FOR SLEEP., Disp: 30 tablet, Rfl: 2      Vitals:    01/23/24 0929 01/23/24 0944   BP: (!) 154/106 132/86   BP Location: Left arm    Patient Position: Sitting    Cuff Size: Adult    Pulse: 77    Temp: 98.3 °F (36.8 °C)    TempSrc: Oral    SpO2: 96%    Weight: 65.7 kg (144 lb 12.8 oz)    Height: 165.1 cm (65\")      Body mass index is 24.1 kg/m².  BMI is within normal parameters. No other follow-up for BMI required.        Physical Exam  Vitals and nursing note reviewed.   Constitutional:       General: She is not in acute distress.     Appearance: Normal appearance. "   HENT:      Right Ear: Tympanic membrane, ear canal and external ear normal.      Left Ear: Tympanic membrane, ear canal and external ear normal.      Nose: Nose normal.      Mouth/Throat:      Mouth: Mucous membranes are moist.   Eyes:      General:         Right eye: No discharge.         Left eye: No discharge.      Conjunctiva/sclera: Conjunctivae normal.   Cardiovascular:      Rate and Rhythm: Normal rate and regular rhythm.      Pulses: Normal pulses.      Heart sounds: Normal heart sounds.   Pulmonary:      Effort: Pulmonary effort is normal.      Breath sounds: Normal breath sounds.   Abdominal:      General: Bowel sounds are normal. There is no distension.      Palpations: Abdomen is soft.      Tenderness: There is no abdominal tenderness.   Musculoskeletal:      Cervical back: Neck supple.      Right hip: Normal.      Left hip: Tenderness present. No bony tenderness. Normal range of motion.      Right lower leg: No edema.      Left lower leg: No edema.      Comments: Left hip: Some pain with external rotation of the hip in the lateral portion of the hip.  Mild tenderness with palpation of the left trochanteric bursa.   Lymphadenopathy:      Cervical: No cervical adenopathy.   Skin:     General: Skin is warm.      Findings: No rash.   Neurological:      General: No focal deficit present.      Mental Status: She is alert. Mental status is at baseline.   Psychiatric:         Mood and Affect: Mood normal.         Behavior: Behavior normal.                   Diagnoses and all orders for this visit:    1. Encounter for health maintenance examination (Primary)  -     CBC (No Diff)  -     Comprehensive Metabolic Panel  -     Hemoglobin A1c  -     Lipid Panel With LDL / HDL Ratio  -     TSH Rfx On Abnormal To Free T4  -     Hepatitis C Antibody    2. Screening for deficiency anemia  -     CBC (No Diff)    3. Screening for diabetes mellitus  -     Comprehensive Metabolic Panel  -     Hemoglobin A1c    4. Screening  for lipid disorders  -     Lipid Panel With LDL / HDL Ratio    5. Screening for thyroid disorder  -     TSH Rfx On Abnormal To Free T4    6. High risk medication use  -     Compliance Drug Analysis, Ur - Urine, Clean Catch    7. Encounter for hepatitis C screening test for low risk patient  -     Hepatitis C Antibody    8. Greater trochanteric bursitis of left hip           The patient was counseled regarding nutrition, physical activity, healthy weight, injury prevention, misuse of tobacco, alcohol and illicit drugs, mental health, immunizations, and screenings.    Additional code:  Left trochanteric bursitis - Likely she has a mildly inflammed bursa in the left hip.  Discussed some hip stretching and exercise as well as use of NSAIDs such as ibuprofen for a flare-up.  If it is not going away or worsening, I am happy to refer her for possible injection to calm down the pain.    Return in about 6 months (around 7/23/2024) for Next scheduled follow up.

## 2024-01-24 LAB
ALBUMIN SERPL-MCNC: 4.4 G/DL (ref 3.9–4.9)
ALBUMIN/GLOB SERPL: 1.8 {RATIO} (ref 1.2–2.2)
ALP SERPL-CCNC: 90 IU/L (ref 44–121)
ALT SERPL-CCNC: 23 IU/L (ref 0–32)
AST SERPL-CCNC: 22 IU/L (ref 0–40)
BILIRUB SERPL-MCNC: 0.5 MG/DL (ref 0–1.2)
BUN SERPL-MCNC: 14 MG/DL (ref 6–24)
BUN/CREAT SERPL: 17 (ref 9–23)
CALCIUM SERPL-MCNC: 9.6 MG/DL (ref 8.7–10.2)
CHLORIDE SERPL-SCNC: 101 MMOL/L (ref 96–106)
CHOLEST SERPL-MCNC: 177 MG/DL (ref 100–199)
CO2 SERPL-SCNC: 25 MMOL/L (ref 20–29)
CREAT SERPL-MCNC: 0.82 MG/DL (ref 0.57–1)
EGFRCR SERPLBLD CKD-EPI 2021: 90 ML/MIN/1.73
ERYTHROCYTE [DISTWIDTH] IN BLOOD BY AUTOMATED COUNT: 11.9 % (ref 11.7–15.4)
GLOBULIN SER CALC-MCNC: 2.4 G/DL (ref 1.5–4.5)
GLUCOSE SERPL-MCNC: 86 MG/DL (ref 70–99)
HBA1C MFR BLD: 5.1 % (ref 4.8–5.6)
HCT VFR BLD AUTO: 38.6 % (ref 34–46.6)
HCV IGG SERPL QL IA: NON REACTIVE
HDLC SERPL-MCNC: 81 MG/DL
HGB BLD-MCNC: 13.1 G/DL (ref 11.1–15.9)
LDLC SERPL CALC-MCNC: 80 MG/DL (ref 0–99)
LDLC/HDLC SERPL: 1 RATIO (ref 0–3.2)
MCH RBC QN AUTO: 32.2 PG (ref 26.6–33)
MCHC RBC AUTO-ENTMCNC: 33.9 G/DL (ref 31.5–35.7)
MCV RBC AUTO: 95 FL (ref 79–97)
PLATELET # BLD AUTO: 279 X10E3/UL (ref 150–450)
POTASSIUM SERPL-SCNC: 4.4 MMOL/L (ref 3.5–5.2)
PROT SERPL-MCNC: 6.8 G/DL (ref 6–8.5)
RBC # BLD AUTO: 4.07 X10E6/UL (ref 3.77–5.28)
SODIUM SERPL-SCNC: 140 MMOL/L (ref 134–144)
TRIGL SERPL-MCNC: 88 MG/DL (ref 0–149)
TSH SERPL DL<=0.005 MIU/L-ACNC: 3.25 UIU/ML (ref 0.45–4.5)
VLDLC SERPL CALC-MCNC: 16 MG/DL (ref 5–40)
WBC # BLD AUTO: 7.2 X10E3/UL (ref 3.4–10.8)

## 2024-01-28 DIAGNOSIS — M50.320 DEGENERATION OF INTERVERTEBRAL DISC OF MID-CERVICAL REGION, UNSPECIFIED SPINAL LEVEL: ICD-10-CM

## 2024-01-29 RX ORDER — GABAPENTIN 600 MG/1
600 TABLET ORAL NIGHTLY
Qty: 90 TABLET | Refills: 1 | Status: SHIPPED | OUTPATIENT
Start: 2024-01-29

## 2024-01-30 LAB — DRUGS UR: NORMAL

## 2024-02-26 DIAGNOSIS — F51.01 PRIMARY INSOMNIA: ICD-10-CM

## 2024-02-26 RX ORDER — ZOLPIDEM TARTRATE 10 MG/1
10 TABLET ORAL NIGHTLY PRN
Qty: 30 TABLET | Refills: 5 | Status: SHIPPED | OUTPATIENT
Start: 2024-02-26

## 2024-02-26 NOTE — TELEPHONE ENCOUNTER
Caller: Sharron Guardado    Relationship: Self    Best call back number: [phone]    Requested Prescriptions:   Requested Prescriptions     Pending Prescriptions Disp Refills    zolpidem (AMBIEN) 10 MG tablet 30 tablet 2     Sig: Take 1 tablet by mouth At Night As Needed for Sleep.        Pharmacy where request should be sent: [facility] [id number] [facility] [address] [facility] [address] - [phone] PH - [phone] FX     Last office visit with prescribing clinician: 1/23/2024   Last telemedicine visit with prescribing clinician: Visit date not found   Next office visit with prescribing clinician: 7/24/2024     Additional details provided by patient: PATIENT HAS A 2 DAY SUPPLY LEFT OF THIS MEDICATION.     Does the patient have less than a 3 day supply:  [x] Yes  [] No    Would you like a call back once the refill request has been completed: [] Yes [x] No    If the office needs to give you a call back, can they leave a voicemail: [] Yes [x] No    Jeremy Lyn Rep   02/26/24 09:07 EST

## 2024-04-25 DIAGNOSIS — F41.1 GENERALIZED ANXIETY DISORDER: ICD-10-CM

## 2024-04-25 RX ORDER — ESCITALOPRAM OXALATE 10 MG/1
10 TABLET ORAL DAILY
Qty: 90 TABLET | Refills: 0 | Status: SHIPPED | OUTPATIENT
Start: 2024-04-25

## 2024-07-24 ENCOUNTER — OFFICE VISIT (OUTPATIENT)
Dept: INTERNAL MEDICINE | Facility: CLINIC | Age: 44
End: 2024-07-24
Payer: COMMERCIAL

## 2024-07-24 VITALS
HEART RATE: 70 BPM | BODY MASS INDEX: 23.99 KG/M2 | RESPIRATION RATE: 18 BRPM | HEIGHT: 65 IN | WEIGHT: 144 LBS | SYSTOLIC BLOOD PRESSURE: 130 MMHG | DIASTOLIC BLOOD PRESSURE: 80 MMHG | OXYGEN SATURATION: 99 %

## 2024-07-24 DIAGNOSIS — F41.1 GENERALIZED ANXIETY DISORDER: ICD-10-CM

## 2024-07-24 DIAGNOSIS — F51.01 PRIMARY INSOMNIA: Chronic | ICD-10-CM

## 2024-07-24 DIAGNOSIS — M50.320 DEGENERATION OF INTERVERTEBRAL DISC OF MID-CERVICAL REGION, UNSPECIFIED SPINAL LEVEL: Primary | Chronic | ICD-10-CM

## 2024-07-24 DIAGNOSIS — G40.109 TEMPORAL LOBE EPILEPSY: ICD-10-CM

## 2024-07-24 DIAGNOSIS — J02.9 ACUTE PHARYNGITIS, UNSPECIFIED ETIOLOGY: ICD-10-CM

## 2024-07-24 PROCEDURE — 99214 OFFICE O/P EST MOD 30 MIN: CPT | Performed by: FAMILY MEDICINE

## 2024-07-24 RX ORDER — LEVETIRACETAM 500 MG/1
TABLET ORAL
Qty: 225 TABLET | Refills: 3 | Status: SHIPPED | OUTPATIENT
Start: 2024-07-24

## 2024-07-24 RX ORDER — GABAPENTIN 600 MG/1
600 TABLET ORAL NIGHTLY
Qty: 90 TABLET | Refills: 0 | Status: SHIPPED | OUTPATIENT
Start: 2024-07-24

## 2024-07-24 RX ORDER — ZOLPIDEM TARTRATE 10 MG/1
10 TABLET ORAL NIGHTLY PRN
Qty: 90 TABLET | Refills: 0 | Status: SHIPPED | OUTPATIENT
Start: 2024-07-24

## 2024-07-24 RX ORDER — ESCITALOPRAM OXALATE 10 MG/1
10 TABLET ORAL DAILY
Qty: 90 TABLET | Refills: 0 | Status: SHIPPED | OUTPATIENT
Start: 2024-07-24

## 2024-07-24 NOTE — PROGRESS NOTES
"Chief Complaint  Follow-up and Sore Throat    Subjective        Sharron Guardado presents to [facility]  History of Present Illness    This patient is following up today for insomnia, degenerative cervical disease, anxiety.  No new complaints regarding these conditions.  Taking medications as prescribed below.  PDMP reviewed and appropriate.      Also wishes to be seen for acute pharyngitis.  No fevers, chills, cough, malaise, nausea.  No treatment other than over the counter.  No known sick contacts.      Current Outpatient Medications:     escitalopram (LEXAPRO) 10 MG tablet, Take 1 tablet by mouth Daily., Disp: 90 tablet, Rfl: 0    fexofenadine (ALLEGRA) 30 MG tablet, Take 1 tablet by mouth 2 (Two) Times a Day., Disp: , Rfl:     gabapentin (NEURONTIN) 600 MG tablet, Take 1 tablet by mouth Every Night., Disp: 90 tablet, Rfl: 0    levETIRAcetam (KEPPRA) 500 MG tablet, TAKE 1 1/2 TABLETS BY MOUTH EVERY MORNING AND 1 AT NIGHT, Disp: 225 tablet, Rfl: 3    MULTIPLE MINERALS-VITAMINS PO, Take 1 tablet by mouth., Disp: , Rfl:     TRI-SPRINTEC 0.18/0.215/0.25 MG-35 MCG per tablet, TK 1 T PO  QD CONTINOUSLY. SKIP PLACEBO AND GO TO THE NEXT PACK, Disp: , Rfl: 1    XULANE 150-35 MCG/24HR, BREANA 1 PA EXT TO THE SKIN 1 TIME WEEKLY, Disp: , Rfl:     zolpidem (AMBIEN) 10 MG tablet, Take 1 tablet by mouth At Night As Needed for Sleep., Disp: 90 tablet, Rfl: 0        Objective   Vital Signs:  /80 (BP Location: Left arm, Patient Position: Sitting, Cuff Size: Small Adult)   Pulse 70   Resp 18   Ht 165.1 cm (65\")   Wt 65.3 kg (144 lb)   SpO2 99%   BMI 23.96 kg/m²   Estimated body mass index is 23.96 kg/m² as calculated from the following:    Height as of this encounter: 165.1 cm (65\").    Weight as of this encounter: 65.3 kg (144 lb).       BMI is within normal parameters. No other follow-up for BMI required.      Physical Exam  Vitals and nursing note reviewed.   Constitutional:       General: " She is not in acute distress.     Appearance: Normal appearance.   HENT:      Mouth/Throat:      Pharynx: Oropharynx is clear.   Cardiovascular:      Rate and Rhythm: Normal rate and regular rhythm.      Heart sounds: Normal heart sounds. No murmur heard.  Pulmonary:      Effort: Pulmonary effort is normal.      Breath sounds: Normal breath sounds.   Lymphadenopathy:      Cervical: No cervical adenopathy.   Neurological:      Mental Status: She is alert.   Psychiatric:         Attention and Perception: Attention and perception normal.         Mood and Affect: Mood and affect normal.         Speech: Speech normal.         Behavior: Behavior normal. Behavior is cooperative.         Thought Content: Thought content normal.         Judgment: Judgment normal.        Result Review :    The following data was reviewed by: Usama Babin MD on 07/24/2024:  Common labs          1/23/2024    10:06   Common Labs   Glucose 86    BUN 14    Creatinine 0.82    Sodium 140    Potassium 4.4    Chloride 101    Calcium 9.6    Total Protein 6.8    Albumin 4.4    Total Bilirubin 0.5    Alkaline Phosphatase 90    AST (SGOT) 22    ALT (SGPT) 23    WBC 7.2    Hemoglobin 13.1    Hematocrit 38.6    Platelets 279    Total Cholesterol 177    Triglycerides 88    HDL Cholesterol 81    LDL Cholesterol  80    Hemoglobin A1C 5.1                   Assessment and Plan     Diagnoses and all orders for this visit:    1. Degeneration of intervertebral disc of mid-cervical region, unspecified spinal level (Primary)  -     gabapentin (NEURONTIN) 600 MG tablet; Take 1 tablet by mouth Every Night.  Dispense: 90 tablet; Refill: 0    2. Primary insomnia  -     zolpidem (AMBIEN) 10 MG tablet; Take 1 tablet by mouth At Night As Needed for Sleep.  Dispense: 90 tablet; Refill: 0    3. Generalized anxiety disorder  -     escitalopram (LEXAPRO) 10 MG tablet; Take 1 tablet by mouth Daily.  Dispense: 90 tablet; Refill: 0    4. Acute pharyngitis, unspecified  etiology      Clinically stable chronic conditions as above.  Continue all medications as above.  Labs reviewed as above.    Her symptoms and exam fit with a viral pharyngitis.  I would recommend fluids, rest as possible tylenol/NSAIDs as needed for the throat pain.  Lele back if not improving over the next 10-14 days.           Follow Up     Return if symptoms worsen or fail to improve.  Patient was given instructions and counseling regarding her condition or for health maintenance advice. Please see specific information pulled into the AVS if appropriate.

## 2024-07-24 NOTE — PATIENT INSTRUCTIONS
"MyChart Tips:    [employer] can be a useful part of our patient care program and is a way for us to communicate lab results and for you to request refills.  Here is some information regarding the best way to use [employer] for communication.       Examples of medical issues that are APPROPRIATE for [employer]:  -Follow-up on problems we have already addressed in a visit such as home testing results, blood pressure readings, glucose readings  -Questions that can be answered with a simple \"yes\" or \"no\"  -Please keep communication concise and to the point.  All other types of communication should be held for an office visit.    Communication that is NOT APPROPRIATE for [employer]:  -New problems, serious problems or urgent problems.  Urgent matters should be addressed by phone for advice, in the office, urgent care or the ER.  -Requesting Paxlovid or Antibiotics: These types of problems require an evaluation unless your provider approved this previously.    -[employer] messages are not email.  Staff will check messages each weekday.  We strive for a 48-hour turnaround on messaging.    -[employer] is not for private issues.  Messages are received first by our office staff.    Please be mindful that sometimes it may take longer to receive a response on [employer].  Many times of the year we have high volumes of messages coming into physician inboxes.      Please also be mindful that [employer] messages become part of your permanent medical record.    We appreciate your respect for the limitations and boundaries of [employer].      Lab Results:  Please allow up to 7 business days for lab results to be sent through [employer] to you before contacting your provider.  Sometimes we are waiting for results to get back from the lab and also your provider needs time to analyze them thoroughly before making recommendations.  Also, providers may be out of the office on vacation.      While the internet has great resources, it is not a substitute for " Was seen in office with fever and runny nose-sx have continued but today temp is 99-runny nose-occ. Cough which is worse at night but no respiratory distress ( retractions,  shortness of breath,  gasping, ) per  Mom. Dad is sick and positive for covid and now mom is starting with respiratory sx and being tested today. Diana does need to be tested again, can assume that she does have covid but monitor closely and if cough is worsening, any resp.distress, fever returns, earache-then will need to be seen again-mom agrees with plan. Continue with vaporizer/humidifier when sleeping-push fluids with regular diet-can sit in the bathroom with hot shower on prn.   interpreting lab results.

## 2024-07-25 RX ORDER — ESCITALOPRAM OXALATE 10 MG/1
10 TABLET ORAL DAILY
Qty: 90 TABLET | Refills: 0 | OUTPATIENT
Start: 2024-07-25

## 2024-09-03 ENCOUNTER — TELEPHONE (OUTPATIENT)
Dept: INTERNAL MEDICINE | Facility: CLINIC | Age: 44
End: 2024-09-03
Payer: COMMERCIAL

## 2024-09-03 NOTE — TELEPHONE ENCOUNTER
Caller: Sharron Guardado    Relationship: Self    Best call back number:     Sharron Guardado (Self) [phone] (Mobile)       What form or medical record are you requesting: FILL OUT PAPERS REGARDING HER SEIZURES       Who is requesting this form or medical record from you:  KRYSTAL       Timeframe paperwork needed:      Additional notes:  SHE WANTED TO KNOW IF A MA COULD FILL THIS OUT FOR HER ? CAN YOU CALL AND ADVISE     SHE CAN DROP OFF TODAY IF NEEDED

## 2024-09-26 ENCOUNTER — OFFICE VISIT (OUTPATIENT)
Dept: INTERNAL MEDICINE | Facility: CLINIC | Age: 44
End: 2024-09-26
Payer: COMMERCIAL

## 2024-09-26 VITALS
SYSTOLIC BLOOD PRESSURE: 122 MMHG | BODY MASS INDEX: 25.43 KG/M2 | DIASTOLIC BLOOD PRESSURE: 86 MMHG | HEART RATE: 70 BPM | RESPIRATION RATE: 18 BRPM | TEMPERATURE: 98 F | OXYGEN SATURATION: 98 % | WEIGHT: 152.6 LBS | HEIGHT: 65 IN

## 2024-09-26 DIAGNOSIS — Z76.89 ENCOUNTER TO ESTABLISH CARE WITH NEW DOCTOR: Primary | ICD-10-CM

## 2024-09-26 DIAGNOSIS — Z87.898 HISTORY OF SEIZURE: Chronic | ICD-10-CM

## 2024-09-26 DIAGNOSIS — G40.109 TEMPORAL LOBE EPILEPSY: Chronic | ICD-10-CM

## 2024-09-26 DIAGNOSIS — F51.01 PRIMARY INSOMNIA: Chronic | ICD-10-CM

## 2024-09-26 PROBLEM — R09.81 HEAD CONGESTION: Status: RESOLVED | Noted: 2018-09-13 | Resolved: 2024-09-26

## 2024-09-26 PROCEDURE — 99214 OFFICE O/P EST MOD 30 MIN: CPT | Performed by: STUDENT IN AN ORGANIZED HEALTH CARE EDUCATION/TRAINING PROGRAM

## 2024-09-26 RX ORDER — ZOLPIDEM TARTRATE 10 MG/1
5 TABLET ORAL NIGHTLY PRN
Qty: 30 TABLET | Refills: 0 | Status: SHIPPED | OUTPATIENT
Start: 2024-09-26

## 2024-10-21 DIAGNOSIS — M50.320 DEGENERATION OF INTERVERTEBRAL DISC OF MID-CERVICAL REGION, UNSPECIFIED SPINAL LEVEL: Chronic | ICD-10-CM

## 2024-10-21 RX ORDER — GABAPENTIN 600 MG/1
600 TABLET ORAL NIGHTLY
Qty: 90 TABLET | Refills: 0 | Status: SHIPPED | OUTPATIENT
Start: 2024-10-21

## 2024-10-21 NOTE — TELEPHONE ENCOUNTER
Rx Refill Note  Requested Prescriptions     Pending Prescriptions Disp Refills    gabapentin (NEURONTIN) 600 MG tablet 90 tablet 0     Sig: Take 1 tablet by mouth Every Night.      Last office visit with prescribing clinician: 9/26/2024   Last telemedicine visit with prescribing clinician: Visit date not found   Next office visit with prescribing clinician: 3/27/2025                         Would you like a call back once the refill request has been completed: [] Yes [] No    If the office needs to give you a call back, can they leave a voicemail: [] Yes [] No    Elisabet Layne  10/21/24, 08:40 EDT

## 2024-10-22 DIAGNOSIS — F51.01 PRIMARY INSOMNIA: Chronic | ICD-10-CM

## 2024-10-22 DIAGNOSIS — F41.1 GENERALIZED ANXIETY DISORDER: ICD-10-CM

## 2024-10-22 DIAGNOSIS — M50.320 DEGENERATION OF INTERVERTEBRAL DISC OF MID-CERVICAL REGION, UNSPECIFIED SPINAL LEVEL: Chronic | ICD-10-CM

## 2024-10-22 RX ORDER — GABAPENTIN 600 MG/1
600 TABLET ORAL NIGHTLY
Qty: 90 TABLET | Refills: 0 | OUTPATIENT
Start: 2024-10-22

## 2024-10-22 RX ORDER — ESCITALOPRAM OXALATE 10 MG/1
10 TABLET ORAL DAILY
Qty: 90 TABLET | Refills: 0 | Status: CANCELLED | OUTPATIENT
Start: 2024-10-22

## 2024-10-22 RX ORDER — ZOLPIDEM TARTRATE 10 MG/1
5 TABLET ORAL NIGHTLY PRN
Qty: 30 TABLET | Refills: 0 | Status: SHIPPED | OUTPATIENT
Start: 2024-10-22

## 2024-10-22 RX ORDER — ESCITALOPRAM OXALATE 10 MG/1
10 TABLET ORAL DAILY
Qty: 90 TABLET | Refills: 0 | Status: SHIPPED | OUTPATIENT
Start: 2024-10-22

## 2024-10-22 RX ORDER — ZOLPIDEM TARTRATE 10 MG/1
5 TABLET ORAL NIGHTLY PRN
Qty: 30 TABLET | Refills: 0 | Status: CANCELLED | OUTPATIENT
Start: 2024-10-22

## 2024-10-22 NOTE — TELEPHONE ENCOUNTER
Caller: Sharron Guardado    Relationship: Self    Best call back number: [phone]     Requested Prescriptions:   Requested Prescriptions     Pending Prescriptions Disp Refills    escitalopram (LEXAPRO) 10 MG tablet 90 tablet 0     Sig: Take 1 tablet by mouth Daily.    zolpidem (AMBIEN) 10 MG tablet 30 tablet 0     Sig: Take 0.5 tablets by mouth At Night As Needed for Sleep.    gabapentin (NEURONTIN) 600 MG tablet 90 tablet 0     Sig: Take 1 tablet by mouth Every Night.        Pharmacy where request should be sent: [facility] [id number] [place] [id number] AT [facility] [address] - [phone] PH - [phone] FX     Last office visit with prescribing clinician: 9/26/2024   Last telemedicine visit with prescribing clinician: Visit date not found   Next office visit with prescribing clinician: 3/27/2025     Additional details provided by patient: PLEASE SEND IN REFILL, PATIENT WILL BE LEAVING TOWN FRIDAY AND WILL BE OUT OF MEDICATION BY THEN    Does the patient have less than a 3 day supply:  [x] Yes  [] No    Would you like a call back once the refill request has been completed: [] Yes [x] No    If the office needs to give you a call back, can they leave a voicemail: [x] Yes [] No    Jeremy Rogers Rep   10/22/24 10:39 EDT

## 2024-10-22 NOTE — TELEPHONE ENCOUNTER
Rx Refill Note  Requested Prescriptions     Pending Prescriptions Disp Refills    escitalopram (LEXAPRO) 10 MG tablet 90 tablet 0     Sig: Take 1 tablet by mouth Daily.      Last office visit with prescribing clinician: 9/26/2024   Last telemedicine visit with prescribing clinician: Visit date not found   Next office visit with prescribing clinician: 3/27/2025                         Would you like a call back once the refill request has been completed: [] Yes [] No    If the office needs to give you a call back, can they leave a voicemail: [] Yes [] No    Elisabet Layne  10/22/24, 09:33 EDT

## 2024-11-26 DIAGNOSIS — F51.01 PRIMARY INSOMNIA: Primary | Chronic | ICD-10-CM

## 2024-11-26 DIAGNOSIS — F51.01 PRIMARY INSOMNIA: Chronic | ICD-10-CM

## 2024-11-26 RX ORDER — ZOLPIDEM TARTRATE 5 MG/1
5 TABLET ORAL NIGHTLY PRN
Qty: 30 TABLET | Refills: 0 | Status: SHIPPED | OUTPATIENT
Start: 2024-11-26

## 2024-11-26 RX ORDER — ZOLPIDEM TARTRATE 10 MG/1
TABLET ORAL
Qty: 30 TABLET | OUTPATIENT
Start: 2024-11-26

## 2024-11-26 NOTE — TELEPHONE ENCOUNTER
Had discussion with patient at last visit about guidelines changing and max recommend dose for Ambien now being 5mg nightly. I will refuse this refill request and instead order 5mg nightly, it appears she had still been taking the 10mg.

## 2024-12-10 ENCOUNTER — TELEPHONE (OUTPATIENT)
Dept: INTERNAL MEDICINE | Facility: CLINIC | Age: 44
End: 2024-12-10
Payer: COMMERCIAL

## 2024-12-10 NOTE — TELEPHONE ENCOUNTER
Caller: Sharron Guardado    Relationship: Self    Best call back number: [phone]    What form or medical record are you requesting: MEDICAL EXCUSE FOR FLU SHOT - PATIENT CANNOT RECEIVE FLU SHOTS DUE TO HAVING A SEIZURE LAST TIME SHE HAD ONE. PATIENT STATES THIS IS DOCUMENTED IN HER CHART, SHE NEEDS A WRITTEN STATEMENT SIGNED BY DR. ANDERSON FAXED TO HER.       How would you like to receive the form or medical records (pick-up, mail, fax): FAX  If fax, what is the fax number: [phone]      Timeframe paperwork needed: ASAP     Additional notes: PRESCRIPTION PAD OR ORDER CAN BE DONE FOR THIS. PLEASE ADVISE.

## 2024-12-12 ENCOUNTER — TELEPHONE (OUTPATIENT)
Dept: INTERNAL MEDICINE | Facility: CLINIC | Age: 44
End: 2024-12-12
Payer: COMMERCIAL

## 2024-12-12 NOTE — TELEPHONE ENCOUNTER
Pt called stating that she did receive the fax regarding the allergy to the flu vacc. However, she can't find it now.  I looked for it in her chart to resend it, I did not see it.  Please resend the letter stating that she can not receive the flu vacc due to negative reactions.

## 2024-12-22 DIAGNOSIS — F51.01 PRIMARY INSOMNIA: Chronic | ICD-10-CM

## 2024-12-23 RX ORDER — ZOLPIDEM TARTRATE 5 MG/1
5 TABLET ORAL NIGHTLY PRN
Qty: 30 TABLET | Refills: 0 | Status: SHIPPED | OUTPATIENT
Start: 2024-12-23

## 2025-01-17 DIAGNOSIS — F41.1 GENERALIZED ANXIETY DISORDER: ICD-10-CM

## 2025-01-17 RX ORDER — ESCITALOPRAM OXALATE 10 MG/1
10 TABLET ORAL DAILY
Qty: 90 TABLET | Refills: 0 | Status: SHIPPED | OUTPATIENT
Start: 2025-01-17

## 2025-01-22 DIAGNOSIS — M50.320 DEGENERATION OF INTERVERTEBRAL DISC OF MID-CERVICAL REGION, UNSPECIFIED SPINAL LEVEL: Chronic | ICD-10-CM

## 2025-01-22 RX ORDER — GABAPENTIN 600 MG/1
600 TABLET ORAL NIGHTLY
Qty: 90 TABLET | Refills: 0 | Status: SHIPPED | OUTPATIENT
Start: 2025-01-22

## 2025-01-22 NOTE — TELEPHONE ENCOUNTER
Rx Refill Note  Requested Prescriptions     Pending Prescriptions Disp Refills    gabapentin (NEURONTIN) 600 MG tablet [Pharmacy Med Name: GABAPENTIN 600MG TABLETS] 90 tablet      Sig: TAKE 1 TABLET BY MOUTH EVERY NIGHT      Last office visit with prescribing clinician: 9/26/2024   Last telemedicine visit with prescribing clinician: Visit date not found   Next office visit with prescribing clinician: 3/27/2025

## 2025-01-26 DIAGNOSIS — F51.01 PRIMARY INSOMNIA: Chronic | ICD-10-CM

## 2025-01-27 RX ORDER — ZOLPIDEM TARTRATE 5 MG/1
5 TABLET ORAL NIGHTLY PRN
Qty: 30 TABLET | Refills: 0 | Status: SHIPPED | OUTPATIENT
Start: 2025-01-27

## 2025-02-25 DIAGNOSIS — F51.01 PRIMARY INSOMNIA: Chronic | ICD-10-CM

## 2025-02-25 RX ORDER — ZOLPIDEM TARTRATE 5 MG/1
5 TABLET ORAL NIGHTLY PRN
Qty: 30 TABLET | Refills: 0 | Status: SHIPPED | OUTPATIENT
Start: 2025-02-25

## 2025-03-06 ENCOUNTER — TELEPHONE (OUTPATIENT)
Dept: INTERNAL MEDICINE | Facility: CLINIC | Age: 45
End: 2025-03-06
Payer: COMMERCIAL

## 2025-03-06 NOTE — TELEPHONE ENCOUNTER
Called pt and LVM about her appointment with Dr. Palm 5/27/2025, I canceled it since it is on a Thursday and Dr. Palm does not work on Thursdays anymore, so she will need to reschedule

## 2025-03-27 DIAGNOSIS — F51.01 PRIMARY INSOMNIA: Chronic | ICD-10-CM

## 2025-03-28 RX ORDER — ZOLPIDEM TARTRATE 5 MG/1
5 TABLET ORAL NIGHTLY PRN
Qty: 30 TABLET | Refills: 0 | Status: SHIPPED | OUTPATIENT
Start: 2025-03-28

## 2025-04-01 ENCOUNTER — OFFICE VISIT (OUTPATIENT)
Dept: INTERNAL MEDICINE | Facility: CLINIC | Age: 45
End: 2025-04-01
Payer: COMMERCIAL

## 2025-04-01 VITALS
BODY MASS INDEX: 25.99 KG/M2 | OXYGEN SATURATION: 100 % | SYSTOLIC BLOOD PRESSURE: 124 MMHG | HEIGHT: 65 IN | RESPIRATION RATE: 18 BRPM | HEART RATE: 80 BPM | DIASTOLIC BLOOD PRESSURE: 70 MMHG | WEIGHT: 156 LBS

## 2025-04-01 DIAGNOSIS — Z12.11 SCREENING FOR MALIGNANT NEOPLASM OF COLON: ICD-10-CM

## 2025-04-01 DIAGNOSIS — G40.109 TEMPORAL LOBE EPILEPSY: Chronic | ICD-10-CM

## 2025-04-01 DIAGNOSIS — F51.01 PRIMARY INSOMNIA: Chronic | ICD-10-CM

## 2025-04-01 DIAGNOSIS — Z00.00 ANNUAL PHYSICAL EXAM: Primary | ICD-10-CM

## 2025-04-01 DIAGNOSIS — F41.1 GENERALIZED ANXIETY DISORDER: Chronic | ICD-10-CM

## 2025-04-01 PROBLEM — M70.62 GREATER TROCHANTERIC BURSITIS OF LEFT HIP: Status: RESOLVED | Noted: 2024-01-23 | Resolved: 2025-04-01

## 2025-04-01 PROBLEM — R53.83 FATIGUE: Status: RESOLVED | Noted: 2018-04-05 | Resolved: 2025-04-01

## 2025-04-01 LAB
ALBUMIN SERPL-MCNC: 4.6 G/DL (ref 3.5–5.2)
ALBUMIN/GLOB SERPL: 1.6 G/DL
ALP SERPL-CCNC: 106 U/L (ref 39–117)
ALT SERPL-CCNC: 19 U/L (ref 1–33)
AST SERPL-CCNC: 26 U/L (ref 1–32)
BASOPHILS # BLD AUTO: 0.01 10*3/MM3 (ref 0–0.2)
BASOPHILS NFR BLD AUTO: 0.1 % (ref 0–1.5)
BILIRUB SERPL-MCNC: 0.8 MG/DL (ref 0–1.2)
BUN SERPL-MCNC: 18 MG/DL (ref 6–20)
BUN/CREAT SERPL: 20 (ref 7–25)
CALCIUM SERPL-MCNC: 9.8 MG/DL (ref 8.6–10.5)
CHLORIDE SERPL-SCNC: 99 MMOL/L (ref 98–107)
CHOLEST SERPL-MCNC: 224 MG/DL (ref 0–200)
CO2 SERPL-SCNC: 26.8 MMOL/L (ref 22–29)
CREAT SERPL-MCNC: 0.9 MG/DL (ref 0.57–1)
EGFRCR SERPLBLD CKD-EPI 2021: 80.5 ML/MIN/1.73
EOSINOPHIL # BLD AUTO: 0.23 10*3/MM3 (ref 0–0.4)
EOSINOPHIL NFR BLD AUTO: 3.1 % (ref 0.3–6.2)
ERYTHROCYTE [DISTWIDTH] IN BLOOD BY AUTOMATED COUNT: 11.8 % (ref 12.3–15.4)
GLOBULIN SER CALC-MCNC: 2.8 GM/DL
GLUCOSE SERPL-MCNC: 82 MG/DL (ref 65–99)
HCT VFR BLD AUTO: 41.1 % (ref 34–46.6)
HDLC SERPL-MCNC: 76 MG/DL (ref 40–60)
HGB BLD-MCNC: 13.7 G/DL (ref 12–15.9)
IMM GRANULOCYTES # BLD AUTO: 0.02 10*3/MM3 (ref 0–0.05)
IMM GRANULOCYTES NFR BLD AUTO: 0.3 % (ref 0–0.5)
LDLC SERPL CALC-MCNC: 97 MG/DL (ref 0–100)
LDLC/HDLC SERPL: 1.14 {RATIO}
LYMPHOCYTES # BLD AUTO: 2.04 10*3/MM3 (ref 0.7–3.1)
LYMPHOCYTES NFR BLD AUTO: 27.2 % (ref 19.6–45.3)
MCH RBC QN AUTO: 30.9 PG (ref 26.6–33)
MCHC RBC AUTO-ENTMCNC: 33.3 G/DL (ref 31.5–35.7)
MCV RBC AUTO: 92.8 FL (ref 79–97)
MONOCYTES # BLD AUTO: 0.36 10*3/MM3 (ref 0.1–0.9)
MONOCYTES NFR BLD AUTO: 4.8 % (ref 5–12)
NEUTROPHILS # BLD AUTO: 4.83 10*3/MM3 (ref 1.7–7)
NEUTROPHILS NFR BLD AUTO: 64.5 % (ref 42.7–76)
NRBC BLD AUTO-RTO: 0 /100 WBC (ref 0–0.2)
PLATELET # BLD AUTO: 284 10*3/MM3 (ref 140–450)
POTASSIUM SERPL-SCNC: 4.3 MMOL/L (ref 3.5–5.2)
PROT SERPL-MCNC: 7.4 G/DL (ref 6–8.5)
RBC # BLD AUTO: 4.43 10*6/MM3 (ref 3.77–5.28)
SODIUM SERPL-SCNC: 138 MMOL/L (ref 136–145)
TRIGL SERPL-MCNC: 306 MG/DL (ref 0–150)
VLDLC SERPL CALC-MCNC: 51 MG/DL (ref 5–40)
WBC # BLD AUTO: 7.49 10*3/MM3 (ref 3.4–10.8)

## 2025-04-01 NOTE — ASSESSMENT & PLAN NOTE
Continue Keppra as prescribed.   Neurology referral pending, gave patient phone number to call and schedule.

## 2025-04-01 NOTE — PROGRESS NOTES
"Chief Complaint  Annual Exam, history of seizure, insomnia, PERICO    Subjective        Sharron Guardado presents to clinic today for Annual Physical.     Overall patient states that she feels well and has no acute complaints. Taking all medications as directed without any noticeable side effects. States that she does not believe that she was called about neurology referral placed at last visit. Provided patient with phone number to call to schedule neurology follow up. She understands that routine follow up with neurology will be important moving forward given her history of multiple seizures. Filled out [place] 's paperwork today for patient, documents scanned into chart.     Patient follows with OBGYN and reports that she is UTD with mammogram and pap smear screenings. Will contact office to request records.     History of Present Illness      Objective   Vital Signs:  /70 (BP Location: Left arm, Patient Position: Sitting, Cuff Size: Adult)   Pulse 80   Resp 18   Ht 165.1 cm (65\")   Wt 70.8 kg (156 lb)   SpO2 100%   BMI 25.96 kg/m²   Estimated body mass index is 25.96 kg/m² as calculated from the following:    Height as of this encounter: 165.1 cm (65\").    Weight as of this encounter: 70.8 kg (156 lb).    Physical Exam  Constitutional:       General: She is not in acute distress.     Appearance: Normal appearance.   HENT:      Right Ear: Tympanic membrane normal.      Left Ear: Tympanic membrane and ear canal normal.      Mouth/Throat:      Mouth: Mucous membranes are moist.      Pharynx: Oropharynx is clear. No posterior oropharyngeal erythema.   Eyes:      Extraocular Movements: Extraocular movements intact.      Conjunctiva/sclera: Conjunctivae normal.   Cardiovascular:      Rate and Rhythm: Normal rate and regular rhythm.      Heart sounds: No murmur heard.  Pulmonary:      Effort: Pulmonary effort is normal. No respiratory distress.      Breath sounds: Normal breath sounds. No wheezing or rales. "   Abdominal:      General: Abdomen is flat. There is no distension.      Palpations: Abdomen is soft.   Musculoskeletal:         General: No swelling or deformity. Normal range of motion.   Skin:     General: Skin is warm and dry.   Neurological:      General: No focal deficit present.      Mental Status: She is oriented to person, place, and time. Mental status is at baseline.   Psychiatric:         Mood and Affect: Mood normal.         Behavior: Behavior normal.     Result Review :               Current Outpatient Medications:     escitalopram (LEXAPRO) 10 MG tablet, TAKE 1 TABLET BY MOUTH DAILY, Disp: 90 tablet, Rfl: 0    fexofenadine (ALLEGRA) 30 MG tablet, Take 1 tablet by mouth As Needed., Disp: , Rfl:     gabapentin (NEURONTIN) 600 MG tablet, TAKE 1 TABLET BY MOUTH EVERY NIGHT, Disp: 90 tablet, Rfl: 0    levETIRAcetam (KEPPRA) 500 MG tablet, TAKE 1 1/2 TABLETS BY MOUTH EVERY MORNING AND 1 AT NIGHT, Disp: 225 tablet, Rfl: 3    MULTIPLE MINERALS-VITAMINS PO, Take 1 tablet by mouth., Disp: , Rfl:     TRI-SPRINTEC 0.18/0.215/0.25 MG-35 MCG per tablet, TK 1 T PO  QD CONTINOUSLY. SKIP PLACEBO AND GO TO THE NEXT PACK, Disp: , Rfl: 1    XULANE 150-35 MCG/24HR, BREANA 1 PA EXT TO THE SKIN 1 TIME WEEKLY, Disp: , Rfl:     zolpidem (AMBIEN) 5 MG tablet, TAKE 1 TABLET BY MOUTH AT NIGHT AS NEEDED FOR SLEEP, Disp: 30 tablet, Rfl: 0      Assessment and Plan   Diagnoses and all orders for this visit:    1. Annual physical exam (Primary)  -     Comprehensive Metabolic Panel  -     CBC & Differential  -     Lipid Panel With LDL / HDL Ratio    2. Screening for malignant neoplasm of colon  Comments:  Colonoscopy versus Cologuard offered, but patient declined.   Will push screening to age 50 per patient request.    3. Generalized anxiety disorder  Assessment & Plan:  Well controlled.   Continue Lexapro daily.       4. Temporal lobe epilepsy  Assessment & Plan:  Continue Keppra as prescribed.   Neurology referral pending, gave patient  phone number to call and schedule.      5. Primary insomnia  Assessment & Plan:  Continue Ambien 5mg nightly PRN.          PDMP reviewed and appropriate.     I personally reviewed PMH, PSxH, family history, allergies, social history, and medication list. I recommend that patient stay UTD on all age- and population-recommended vaccinations, avoid tobacco use, and use moderation if drinking alcohol (no more than 7 drinks per week). I provided information on healthy eating habits and exercise in Wrap Up tab.          Follow Up   Return in about 6 months (around 10/1/2025) for routine follow up, lab work.    Patient was given instructions and counseling regarding her condition or for health maintenance advice. Please see specific information pulled into the AVS if appropriate.     Taryn Palm MD

## 2025-04-02 PROBLEM — E78.2 MIXED HYPERLIPIDEMIA: Status: ACTIVE | Noted: 2025-04-02

## 2025-04-17 DIAGNOSIS — F41.1 GENERALIZED ANXIETY DISORDER: ICD-10-CM

## 2025-04-17 RX ORDER — ESCITALOPRAM OXALATE 10 MG/1
10 TABLET ORAL DAILY
Qty: 90 TABLET | Refills: 0 | Status: SHIPPED | OUTPATIENT
Start: 2025-04-17

## 2025-04-21 DIAGNOSIS — M50.320 DEGENERATION OF INTERVERTEBRAL DISC OF MID-CERVICAL REGION, UNSPECIFIED SPINAL LEVEL: Chronic | ICD-10-CM

## 2025-04-22 RX ORDER — GABAPENTIN 600 MG/1
600 TABLET ORAL NIGHTLY
Qty: 90 TABLET | Refills: 0 | Status: SHIPPED | OUTPATIENT
Start: 2025-04-22

## 2025-04-27 DIAGNOSIS — F51.01 PRIMARY INSOMNIA: Chronic | ICD-10-CM

## 2025-04-28 RX ORDER — ZOLPIDEM TARTRATE 5 MG/1
5 TABLET ORAL NIGHTLY PRN
Qty: 30 TABLET | Refills: 0 | Status: SHIPPED | OUTPATIENT
Start: 2025-04-28

## 2025-05-25 DIAGNOSIS — F51.01 PRIMARY INSOMNIA: Chronic | ICD-10-CM

## 2025-05-28 RX ORDER — ZOLPIDEM TARTRATE 5 MG/1
5 TABLET ORAL NIGHTLY PRN
Qty: 30 TABLET | Refills: 0 | Status: SHIPPED | OUTPATIENT
Start: 2025-05-28

## 2025-06-26 DIAGNOSIS — F51.01 PRIMARY INSOMNIA: Chronic | ICD-10-CM

## 2025-06-26 RX ORDER — ZOLPIDEM TARTRATE 5 MG/1
5 TABLET ORAL NIGHTLY PRN
Qty: 30 TABLET | Refills: 0 | Status: SHIPPED | OUTPATIENT
Start: 2025-06-26

## 2025-07-17 DIAGNOSIS — F41.1 GENERALIZED ANXIETY DISORDER: ICD-10-CM

## 2025-07-17 RX ORDER — ESCITALOPRAM OXALATE 10 MG/1
10 TABLET ORAL DAILY
Qty: 90 TABLET | Refills: 0 | Status: SHIPPED | OUTPATIENT
Start: 2025-07-17

## 2025-07-18 DIAGNOSIS — M50.320 DEGENERATION OF INTERVERTEBRAL DISC OF MID-CERVICAL REGION, UNSPECIFIED SPINAL LEVEL: Chronic | ICD-10-CM

## 2025-07-18 RX ORDER — GABAPENTIN 600 MG/1
600 TABLET ORAL NIGHTLY
Qty: 90 TABLET | Refills: 0 | Status: SHIPPED | OUTPATIENT
Start: 2025-07-18

## 2025-07-24 DIAGNOSIS — G40.109 TEMPORAL LOBE EPILEPSY: ICD-10-CM

## 2025-07-24 RX ORDER — LEVETIRACETAM 500 MG/1
TABLET ORAL
Qty: 225 TABLET | Refills: 3 | Status: SHIPPED | OUTPATIENT
Start: 2025-07-24

## 2025-07-24 NOTE — TELEPHONE ENCOUNTER
Caller: [id number] - [place], [place] - [address] AT [address] [facility] [facility] [phone] [facility] [phone] FX    Relationship: Pharmacy    Best call back number: [phone]    Requested Prescriptions:   Requested Prescriptions     Pending Prescriptions Disp Refills    levETIRAcetam (KEPPRA) 500 MG tablet 225 tablet 3     Sig: TAKE 1 1/2 TABLETS BY MOUTH EVERY MORNING AND 1 AT NIGHT        Pharmacy where request should be sent: [facility] #[id number] - [place], [place] - [address] AT [address] [facility] [phone] [facility] [phone] FX     Last office visit with prescribing clinician: 4/1/2025   Last telemedicine visit with prescribing clinician: Visit date not found   Next office visit with prescribing clinician: 10/7/2025     Additional details provided by patient: PHARMACY CALLING FOR REFILL     Does the patient have less than a 3 day supply:  [x] Yes  [] No    Would you like a call back once the refill request has been completed: [] Yes [x] No    If the office needs to give you a call back, can they leave a voicemail: [] Yes [x] No    Jeremy Nolasco Rep   07/24/25 14:35 EDT

## 2025-07-29 DIAGNOSIS — F51.01 PRIMARY INSOMNIA: Chronic | ICD-10-CM

## 2025-07-29 RX ORDER — ZOLPIDEM TARTRATE 5 MG/1
5 TABLET ORAL NIGHTLY PRN
Qty: 30 TABLET | Refills: 0 | Status: SHIPPED | OUTPATIENT
Start: 2025-07-29

## 2025-08-28 DIAGNOSIS — F51.01 PRIMARY INSOMNIA: Chronic | ICD-10-CM

## 2025-08-28 RX ORDER — ZOLPIDEM TARTRATE 5 MG/1
5 TABLET ORAL NIGHTLY PRN
Qty: 30 TABLET | Refills: 0 | Status: SHIPPED | OUTPATIENT
Start: 2025-08-28